# Patient Record
Sex: MALE | Race: WHITE | NOT HISPANIC OR LATINO | Employment: OTHER | URBAN - METROPOLITAN AREA
[De-identification: names, ages, dates, MRNs, and addresses within clinical notes are randomized per-mention and may not be internally consistent; named-entity substitution may affect disease eponyms.]

---

## 2017-02-10 ENCOUNTER — APPOINTMENT (OUTPATIENT)
Dept: GENERAL RADIOLOGY | Facility: HOSPITAL | Age: 67
End: 2017-02-10

## 2017-02-10 ENCOUNTER — HOSPITAL ENCOUNTER (OUTPATIENT)
Facility: HOSPITAL | Age: 67
Setting detail: OBSERVATION
LOS: 1 days | Discharge: HOME | End: 2017-02-13
Attending: EMERGENCY MEDICINE | Admitting: INTERNAL MEDICINE

## 2017-02-10 DIAGNOSIS — J44.0 COPD (CHRONIC OBSTRUCTIVE PULMONARY DISEASE) WITH ACUTE BRONCHITIS (HCC): ICD-10-CM

## 2017-02-10 DIAGNOSIS — R19.7 DIARRHEA, UNSPECIFIED TYPE: ICD-10-CM

## 2017-02-10 DIAGNOSIS — E86.9 VOLUME DEPLETION: ICD-10-CM

## 2017-02-10 DIAGNOSIS — B34.9 VIRAL SYNDROME: ICD-10-CM

## 2017-02-10 DIAGNOSIS — E86.0 DEHYDRATION: ICD-10-CM

## 2017-02-10 DIAGNOSIS — N17.9 ACUTE KIDNEY INJURY (HCC): ICD-10-CM

## 2017-02-10 DIAGNOSIS — J10.1 INFLUENZA A: Primary | ICD-10-CM

## 2017-02-10 DIAGNOSIS — J20.9 COPD (CHRONIC OBSTRUCTIVE PULMONARY DISEASE) WITH ACUTE BRONCHITIS (HCC): ICD-10-CM

## 2017-02-10 PROBLEM — I25.10 CAD (CORONARY ARTERY DISEASE): Status: ACTIVE | Noted: 2017-02-10

## 2017-02-10 LAB
ALBUMIN SERPL-MCNC: 4.2 G/DL (ref 3.5–5.2)
ALBUMIN/GLOB SERPL: 1.3 G/DL
ALP SERPL-CCNC: 62 U/L (ref 39–117)
ALT SERPL W P-5'-P-CCNC: 25 U/L (ref 1–41)
ANION GAP SERPL CALCULATED.3IONS-SCNC: 15.5 MMOL/L
AST SERPL-CCNC: 46 U/L (ref 1–40)
BASOPHILS # BLD AUTO: 0.01 10*3/MM3 (ref 0–0.2)
BASOPHILS NFR BLD AUTO: 0.1 % (ref 0–1.5)
BILIRUB SERPL-MCNC: 0.5 MG/DL (ref 0.1–1.2)
BUN BLD-MCNC: 47 MG/DL (ref 8–23)
BUN/CREAT SERPL: 34.1 (ref 7–25)
CALCIUM SPEC-SCNC: 9.6 MG/DL (ref 8.6–10.5)
CHLORIDE SERPL-SCNC: 99 MMOL/L (ref 98–107)
CO2 SERPL-SCNC: 26.5 MMOL/L (ref 22–29)
CREAT BLD-MCNC: 1.38 MG/DL (ref 0.76–1.27)
DEPRECATED RDW RBC AUTO: 48.1 FL (ref 37–54)
EOSINOPHIL # BLD AUTO: 0 10*3/MM3 (ref 0–0.7)
EOSINOPHIL NFR BLD AUTO: 0 % (ref 0.3–6.2)
ERYTHROCYTE [DISTWIDTH] IN BLOOD BY AUTOMATED COUNT: 12.7 % (ref 11.5–14.5)
FLUAV AG NPH QL: POSITIVE
FLUBV AG NPH QL IA: NEGATIVE
GFR SERPL CREATININE-BSD FRML MDRD: 51 ML/MIN/1.73
GLOBULIN UR ELPH-MCNC: 3.3 GM/DL
GLUCOSE BLD-MCNC: 117 MG/DL (ref 65–99)
GLUCOSE BLDC GLUCOMTR-MCNC: 93 MG/DL (ref 70–130)
HCT VFR BLD AUTO: 50.1 % (ref 40.4–52.2)
HGB BLD-MCNC: 17.1 G/DL (ref 13.7–17.6)
HOLD SPECIMEN: NORMAL
HOLD SPECIMEN: NORMAL
IMM GRANULOCYTES # BLD: 0.02 10*3/MM3 (ref 0–0.03)
IMM GRANULOCYTES NFR BLD: 0.2 % (ref 0–0.5)
LYMPHOCYTES # BLD AUTO: 1.16 10*3/MM3 (ref 0.9–4.8)
LYMPHOCYTES NFR BLD AUTO: 10.1 % (ref 19.6–45.3)
MCH RBC QN AUTO: 35.2 PG (ref 27–32.7)
MCHC RBC AUTO-ENTMCNC: 34.1 G/DL (ref 32.6–36.4)
MCV RBC AUTO: 103.1 FL (ref 79.8–96.2)
MONOCYTES # BLD AUTO: 1.59 10*3/MM3 (ref 0.2–1.2)
MONOCYTES NFR BLD AUTO: 13.8 % (ref 5–12)
NEUTROPHILS # BLD AUTO: 8.74 10*3/MM3 (ref 1.9–8.1)
NEUTROPHILS NFR BLD AUTO: 75.8 % (ref 42.7–76)
NT-PROBNP SERPL-MCNC: 97.1 PG/ML (ref 0–900)
PLATELET # BLD AUTO: 207 10*3/MM3 (ref 140–500)
PMV BLD AUTO: 11.1 FL (ref 6–12)
POTASSIUM BLD-SCNC: 4.3 MMOL/L (ref 3.5–5.2)
PROT SERPL-MCNC: 7.5 G/DL (ref 6–8.5)
RBC # BLD AUTO: 4.86 10*6/MM3 (ref 4.6–6)
SODIUM BLD-SCNC: 141 MMOL/L (ref 136–145)
TROPONIN T SERPL-MCNC: <0.01 NG/ML (ref 0–0.03)
WBC NRBC COR # BLD: 11.52 10*3/MM3 (ref 4.5–10.7)
WHOLE BLOOD HOLD SPECIMEN: NORMAL
WHOLE BLOOD HOLD SPECIMEN: NORMAL

## 2017-02-10 PROCEDURE — 87804 INFLUENZA ASSAY W/OPTIC: CPT | Performed by: EMERGENCY MEDICINE

## 2017-02-10 PROCEDURE — 96360 HYDRATION IV INFUSION INIT: CPT

## 2017-02-10 PROCEDURE — 80053 COMPREHEN METABOLIC PANEL: CPT | Performed by: EMERGENCY MEDICINE

## 2017-02-10 PROCEDURE — 85025 COMPLETE CBC W/AUTO DIFF WBC: CPT | Performed by: EMERGENCY MEDICINE

## 2017-02-10 PROCEDURE — 94640 AIRWAY INHALATION TREATMENT: CPT

## 2017-02-10 PROCEDURE — 25010000002 ENOXAPARIN PER 10 MG: Performed by: INTERNAL MEDICINE

## 2017-02-10 PROCEDURE — 99285 EMERGENCY DEPT VISIT HI MDM: CPT

## 2017-02-10 PROCEDURE — 93010 ELECTROCARDIOGRAM REPORT: CPT | Performed by: INTERNAL MEDICINE

## 2017-02-10 PROCEDURE — 84484 ASSAY OF TROPONIN QUANT: CPT | Performed by: EMERGENCY MEDICINE

## 2017-02-10 PROCEDURE — 83880 ASSAY OF NATRIURETIC PEPTIDE: CPT | Performed by: EMERGENCY MEDICINE

## 2017-02-10 PROCEDURE — G0378 HOSPITAL OBSERVATION PER HR: HCPCS

## 2017-02-10 PROCEDURE — 93005 ELECTROCARDIOGRAM TRACING: CPT | Performed by: EMERGENCY MEDICINE

## 2017-02-10 PROCEDURE — 71020 HC CHEST PA AND LATERAL: CPT

## 2017-02-10 PROCEDURE — 96372 THER/PROPH/DIAG INJ SC/IM: CPT

## 2017-02-10 PROCEDURE — 82962 GLUCOSE BLOOD TEST: CPT

## 2017-02-10 RX ORDER — LEVOTHYROXINE SODIUM 0.1 MG/1
100 TABLET ORAL DAILY
Status: DISCONTINUED | OUTPATIENT
Start: 2017-02-11 | End: 2017-02-13 | Stop reason: HOSPADM

## 2017-02-10 RX ORDER — SODIUM CHLORIDE 9 MG/ML
100 INJECTION, SOLUTION INTRAVENOUS CONTINUOUS
Status: DISCONTINUED | OUTPATIENT
Start: 2017-02-10 | End: 2017-02-10

## 2017-02-10 RX ORDER — SODIUM CHLORIDE 0.9 % (FLUSH) 0.9 %
1-10 SYRINGE (ML) INJECTION AS NEEDED
Status: DISCONTINUED | OUTPATIENT
Start: 2017-02-10 | End: 2017-02-13 | Stop reason: HOSPADM

## 2017-02-10 RX ORDER — SODIUM CHLORIDE 9 MG/ML
50 INJECTION, SOLUTION INTRAVENOUS CONTINUOUS
Status: DISCONTINUED | OUTPATIENT
Start: 2017-02-10 | End: 2017-02-12

## 2017-02-10 RX ORDER — ASPIRIN 81 MG/1
81 TABLET, CHEWABLE ORAL DAILY
Status: DISCONTINUED | OUTPATIENT
Start: 2017-02-11 | End: 2017-02-13 | Stop reason: HOSPADM

## 2017-02-10 RX ORDER — SODIUM CHLORIDE 0.9 % (FLUSH) 0.9 %
10 SYRINGE (ML) INJECTION AS NEEDED
Status: DISCONTINUED | OUTPATIENT
Start: 2017-02-10 | End: 2017-02-13 | Stop reason: HOSPADM

## 2017-02-10 RX ORDER — ONDANSETRON 4 MG/1
4 TABLET, ORALLY DISINTEGRATING ORAL EVERY 6 HOURS PRN
Status: DISCONTINUED | OUTPATIENT
Start: 2017-02-10 | End: 2017-02-13 | Stop reason: HOSPADM

## 2017-02-10 RX ORDER — ONDANSETRON 4 MG/1
4 TABLET, FILM COATED ORAL EVERY 6 HOURS PRN
Status: DISCONTINUED | OUTPATIENT
Start: 2017-02-10 | End: 2017-02-13 | Stop reason: HOSPADM

## 2017-02-10 RX ORDER — NICOTINE POLACRILEX 4 MG
15 LOZENGE BUCCAL
Status: DISCONTINUED | OUTPATIENT
Start: 2017-02-10 | End: 2017-02-13 | Stop reason: HOSPADM

## 2017-02-10 RX ORDER — ONDANSETRON 2 MG/ML
4 INJECTION INTRAMUSCULAR; INTRAVENOUS EVERY 6 HOURS PRN
Status: DISCONTINUED | OUTPATIENT
Start: 2017-02-10 | End: 2017-02-13 | Stop reason: HOSPADM

## 2017-02-10 RX ORDER — DIPHENOXYLATE HYDROCHLORIDE AND ATROPINE SULFATE 2.5; .025 MG/1; MG/1
1 TABLET ORAL 4 TIMES DAILY PRN
Status: DISCONTINUED | OUTPATIENT
Start: 2017-02-10 | End: 2017-02-13 | Stop reason: HOSPADM

## 2017-02-10 RX ORDER — ACETAMINOPHEN 325 MG/1
650 TABLET ORAL EVERY 4 HOURS PRN
Status: DISCONTINUED | OUTPATIENT
Start: 2017-02-10 | End: 2017-02-13 | Stop reason: HOSPADM

## 2017-02-10 RX ORDER — IPRATROPIUM BROMIDE AND ALBUTEROL SULFATE 2.5; .5 MG/3ML; MG/3ML
3 SOLUTION RESPIRATORY (INHALATION)
Status: DISCONTINUED | OUTPATIENT
Start: 2017-02-10 | End: 2017-02-13 | Stop reason: HOSPADM

## 2017-02-10 RX ORDER — PRAVASTATIN SODIUM 40 MG
40 TABLET ORAL DAILY
Status: DISCONTINUED | OUTPATIENT
Start: 2017-02-11 | End: 2017-02-13 | Stop reason: HOSPADM

## 2017-02-10 RX ORDER — DEXTROSE MONOHYDRATE 25 G/50ML
25 INJECTION, SOLUTION INTRAVENOUS
Status: DISCONTINUED | OUTPATIENT
Start: 2017-02-10 | End: 2017-02-13 | Stop reason: HOSPADM

## 2017-02-10 RX ORDER — OSELTAMIVIR PHOSPHATE 75 MG/1
75 CAPSULE ORAL EVERY 12 HOURS SCHEDULED
Status: DISCONTINUED | OUTPATIENT
Start: 2017-02-10 | End: 2017-02-13 | Stop reason: HOSPADM

## 2017-02-10 RX ADMIN — SODIUM CHLORIDE 100 ML/HR: 9 INJECTION, SOLUTION INTRAVENOUS at 19:45

## 2017-02-10 RX ADMIN — IPRATROPIUM BROMIDE AND ALBUTEROL SULFATE 3 ML: .5; 3 SOLUTION RESPIRATORY (INHALATION) at 23:22

## 2017-02-10 RX ADMIN — ENOXAPARIN SODIUM 40 MG: 40 INJECTION SUBCUTANEOUS at 22:06

## 2017-02-10 RX ADMIN — SODIUM CHLORIDE 1000 ML: 9 INJECTION, SOLUTION INTRAVENOUS at 16:27

## 2017-02-10 RX ADMIN — SODIUM CHLORIDE 125 ML/HR: 9 INJECTION, SOLUTION INTRAVENOUS at 18:40

## 2017-02-10 NOTE — ED NOTES
"Patient states he was diagnosed with a sinus infection on Monday, but states \"I feel so dried out, and every time I drink something, it comes right back up\" and states he is having increased shortness of breath.      Jayda Salgado RN  02/10/17 2790    "

## 2017-02-10 NOTE — ED PROVIDER NOTES
EMERGENCY DEPARTMENT ENCOUNTER    CHIEF COMPLAINT  Chief Complaint: Flu-like symptoms  History given by: Pt  History limited by: None  Room Number: 631/1  PMD: MICHAEL Collado      HPI:  Pt is a 66 y.o. male who presents complaining of flu-like symptoms onset 4 days ago including sore throat, fever on Monday, chills, and diaphoresis. He also c/o near-syncopal twice yesterday and diarrhea onset Tuesday. Pt was given steroids by PMD on Monday, but he has had no relief. He denies vomiting, abd pain, and recent abs. He reports his wife is in the ED with similar symptoms.    Duration:  4 days  Onset: gradual  Timing: constant  Location: n/a  Radiation: n/a  Quality: n/a  Intensity/Severity: moderate  Progression: gradually worsening  Associated Symptoms: sore trhoat, fever, chills, diaphoresis, near-syncope and diarrhea  Aggravating Factors: none  Alleviating Factors: none  Previous Episodes: none  Treatment before arrival: steroids given by PMD    PAST MEDICAL HISTORY  Active Ambulatory Problems     Diagnosis Date Noted   • S/P coronary artery stent placement 05/06/2016   • ST elevation myocardial infarction involving left anterior descending (LAD) coronary artery 05/06/2016   • Diabetes mellitus without complication 05/06/2016   • Essential hypertension 05/06/2016     Resolved Ambulatory Problems     Diagnosis Date Noted   • No Resolved Ambulatory Problems     Past Medical History   Diagnosis Date   • CAD (coronary artery disease)    • COPD (chronic obstructive pulmonary disease)    • Diabetes    • Hyperlipidemia    • Myocardial infarction 03/2011   • Sinus bradycardia        PAST SURGICAL HISTORY  Past Surgical History   Procedure Laterality Date   • Cardiac catheterization       2011:3.0x15mm Vision pLAD; 3.5x20mm Vision p circ; 3.5x28mm Vision to mRCA   • Coronary stent placement       2011:3.0x15mm Vision pLAD; 3.5x20mm Vision p circ; 3.5x28mm Vision to mRCA       FAMILY HISTORY  History reviewed. No  pertinent family history.    SOCIAL HISTORY  Social History     Social History   • Marital status:      Spouse name: N/A   • Number of children: N/A   • Years of education: N/A     Occupational History   • Not on file.     Social History Main Topics   • Smoking status: Former Smoker   • Smokeless tobacco: Not on file   • Alcohol use No   • Drug use: No   • Sexual activity: Not on file     Other Topics Concern   • Not on file     Social History Narrative       ALLERGIES  Review of patient's allergies indicates no known allergies.    REVIEW OF SYSTEMS  Review of Systems   Constitutional: Positive for chills, diaphoresis and fever ( resolved). Negative for activity change and appetite change.   HENT: Positive for sore throat. Negative for congestion.    Eyes: Negative.    Respiratory: Negative for cough and shortness of breath.    Cardiovascular: Negative for chest pain and leg swelling.   Gastrointestinal: Positive for diarrhea. Negative for abdominal pain and vomiting.   Endocrine: Negative.    Genitourinary: Negative for decreased urine volume and dysuria.   Musculoskeletal: Negative for neck pain.   Skin: Negative for rash and wound.   Allergic/Immunologic: Negative.    Neurological: Positive for syncope ( near-syncope twice yesterday). Negative for weakness, numbness and headaches.   Hematological: Negative.    Psychiatric/Behavioral: Negative.    All other systems reviewed and are negative.      PHYSICAL EXAM  ED Triage Vitals   Temp Heart Rate Resp BP SpO2   02/10/17 1438 02/10/17 1438 02/10/17 1438 02/10/17 1500 02/10/17 1438   99 °F (37.2 °C) 105 18 99/77 92 %      Temp src Heart Rate Source Patient Position BP Location FiO2 (%)   02/10/17 1438 02/10/17 1438 02/10/17 1500 -- --   Tympanic Monitor Sitting         Physical Exam   Constitutional: He is oriented to person, place, and time and well-developed, well-nourished, and in no distress.   HENT:   Head: Normocephalic and atraumatic.   Eyes: EOM are  normal. Pupils are equal, round, and reactive to light.   Neck: Normal range of motion. Neck supple.   Cardiovascular: Normal rate, regular rhythm and normal heart sounds.    Pulmonary/Chest: Effort normal. No respiratory distress. He has wheezes ( faint, in the bases bilaterally).   Abdominal: Soft. There is no tenderness. There is no rebound and no guarding.   Musculoskeletal: Normal range of motion. He exhibits no edema.   Neurological: He is alert and oriented to person, place, and time. He has normal sensation and normal strength.   Skin: Skin is warm and dry.   Psychiatric: Mood and affect normal.   Nursing note and vitals reviewed.      LAB RESULTS  Lab Results (last 24 hours)     Procedure Component Value Units Date/Time    CBC & Differential [[id number]] Collected:  02/10/17 1556    Specimen:  Blood Updated:  02/10/17 1626    Narrative:       The following orders were created for panel order CBC & Differential.  Procedure                               Abnormality         Status                     ---------                               -----------         ------                     CBC Auto Differential[[id number]]         Abnormal            Final result                 Please view results for these tests on the individual orders.    Comprehensive Metabolic Panel [[id number]]  (Abnormal) Collected:  02/10/17 1556    Specimen:  Blood Updated:  02/10/17 1642     Glucose 117 (H) mg/dL      BUN 47 (H) mg/dL      Creatinine 1.38 (H) mg/dL      Sodium 141 mmol/L      Potassium 4.3 mmol/L      Chloride 99 mmol/L      CO2 26.5 mmol/L      Calcium 9.6 mg/dL      Total Protein 7.5 g/dL      Albumin 4.20 g/dL      ALT (SGPT) 25 U/L      AST (SGOT) 46 (H) U/L      Alkaline Phosphatase 62 U/L      Total Bilirubin 0.5 mg/dL      eGFR Non African Amer 51 (L) mL/min/1.73      Globulin 3.3 gm/dL      A/G Ratio 1.3 g/dL      BUN/Creatinine Ratio 34.1 (H)      Anion Gap 15.5 mmol/L     BNP [[id number]]  (Normal) Collected:   02/10/17 1556    Specimen:  Blood Updated:  02/10/17 1659     proBNP 97.1 pg/mL     Narrative:       Among patients with dyspnea, NT-proBNP is highly sensitive for the detection of acute congestive heart failure. In addition NT-proBNP of <300 pg/ml effectively rules out acute congestive heart failure with 99% negative predictive value.    Troponin [[id number]]  (Normal) Collected:  02/10/17 1556    Specimen:  Blood Updated:  02/10/17 1659     Troponin T <0.010 ng/mL     Narrative:       Troponin T Reference Ranges:  Less than 0.03 ng/mL:    Negative for AMI  0.03 to 0.09 ng/mL:      Indeterminant for AMI  Greater than 0.09 ng/mL: Positive for AMI    CBC Auto Differential [[id number]]  (Abnormal) Collected:  02/10/17 1556    Specimen:  Blood Updated:  02/10/17 1626     WBC 11.52 (H) 10*3/mm3      RBC 4.86 10*6/mm3      Hemoglobin 17.1 g/dL      Hematocrit 50.1 %      .1 (H) fL      MCH 35.2 (H) pg      MCHC 34.1 g/dL      RDW 12.7 %      RDW-SD 48.1 fl      MPV 11.1 fL      Platelets 207 10*3/mm3      Neutrophil % 75.8 %      Lymphocyte % 10.1 (L) %      Monocyte % 13.8 (H) %      Eosinophil % 0.0 (L) %      Basophil % 0.1 %      Immature Grans % 0.2 %      Neutrophils, Absolute 8.74 (H) 10*3/mm3      Lymphocytes, Absolute 1.16 10*3/mm3      Monocytes, Absolute 1.59 (H) 10*3/mm3      Eosinophils, Absolute 0.00 10*3/mm3      Basophils, Absolute 0.01 10*3/mm3      Immature Grans, Absolute 0.02 10*3/mm3     Influenza Antigen [[id number]]  (Abnormal) Collected:  02/10/17 1622    Specimen:  Swab from Nasopharynx Updated:  02/10/17 1703     Influenza A Ag, EIA Positive (A)      Influenza B Ag, EIA Negative     POC Glucose Fingerstick [[id number]]  (Normal) Collected:  02/10/17 2128    Specimen:  Blood Updated:  02/10/17 2129     Glucose 93 mg/dL     Narrative:       Meter: [id number] : [id number] Jese Renee I ordered the above labs and reviewed the results    RADIOLOGY  XR Chest 2 View   Final Result    Negative acute        I ordered the above noted radiological studies. Interpreted by radiologist. Reviewed by me in PACS.       PROCEDURES  Procedures  EKG           EKG time: 1539  Rhythm/Rate: NSR, 91  P waves and MI: Nml  QRS, axis: Low voltage, otherwise nml   ST and T waves: Nml     Interpreted Contemporaneously by me, independently viewed  Improved compared to prior 2/26/11    PROGRESS AND CONSULTS  ED Course     1523  Ordered EKG, labs (including influenza antigen), and CXR for further evaluation.    1649  Placed a call to [facility] for consult to admit.    1715  Discussed case with Dr Miles  Reviewed history, exam, results and treatments.  Discussed concerns and plan of care. Dr Miles accepts pt to be admitted to Med/Surg.    1718  Rechecked pt.  D/w pt and family the need for admission because of flu and dehydration. Pt and family understand and agree with plan. All questions addressed.       MEDICAL DECISION MAKING  Results were reviewed/discussed with the patient and they were also made aware of online access. Pt also made aware that some labs, such as cultures, will not be resulted during ER visit and follow up with PMD is necessary.     MDM  Number of Diagnoses or Management Options  Acute kidney injury:   Dehydration:   Diarrhea, unspecified type:   Influenza A:   Viral syndrome:   Volume depletion:      Amount and/or Complexity of Data Reviewed  Clinical lab tests: reviewed and ordered (Glucose 117 (H)   BUN 47 (H)   Creatinine 1.38 (H)   BUN/Creatinine Ratio 34.1 (H)   WBC 11.52 (H)   Influenza A - positive)  Tests in the radiology section of CPT®: reviewed and ordered (CXR - negative acute  CT - negative acute)  Tests in the medicine section of CPT®: reviewed and ordered (See EKG procedure note for interpretation.)  Decide to obtain previous medical records or to obtain history from someone other than the patient: yes  Review and summarize past medical records: yes           DIAGNOSIS  Final  diagnoses:   Influenza A   Viral syndrome   Diarrhea, unspecified type   Volume depletion   Dehydration   Acute kidney injury       DISPOSITION  ADMISSION    Discussed treatment plan and reason for admission with pt/family and admitting physician.  Pt/family voiced understanding of the plan for admission for further testing/treatment as needed.         Latest Documented Vital Signs:  As of 10:13 PM  BP- 101/70 HR- 73 Temp- 96.4 °F (35.8 °C) (Oral) O2 sat- 94%    --  Documentation assistance provided by leila Haas for Dr. Bobo.  Information recorded by the scribe was done at my direction and has been verified and validated by me.                 Ana Haas  02/10/17 1723       Sidney Bobo MD  02/10/17 5282

## 2017-02-10 NOTE — ED NOTES
Pt to ED for cough, soa, congestion, fever, diarrhea for several days. States saw PCP on Monday and dx with sinus infection, symptoms not improving.      Sharon Walters RN  02/10/17 3094

## 2017-02-11 LAB
ANION GAP SERPL CALCULATED.3IONS-SCNC: 13 MMOL/L
BUN BLD-MCNC: 40 MG/DL (ref 8–23)
BUN/CREAT SERPL: 40.8 (ref 7–25)
CALCIUM SPEC-SCNC: 8.1 MG/DL (ref 8.6–10.5)
CHLORIDE SERPL-SCNC: 106 MMOL/L (ref 98–107)
CO2 SERPL-SCNC: 24 MMOL/L (ref 22–29)
CREAT BLD-MCNC: 0.98 MG/DL (ref 0.76–1.27)
GFR SERPL CREATININE-BSD FRML MDRD: 76 ML/MIN/1.73
GLUCOSE BLD-MCNC: 90 MG/DL (ref 65–99)
GLUCOSE BLDC GLUCOMTR-MCNC: 160 MG/DL (ref 70–130)
GLUCOSE BLDC GLUCOMTR-MCNC: 231 MG/DL (ref 70–130)
GLUCOSE BLDC GLUCOMTR-MCNC: 93 MG/DL (ref 70–130)
GLUCOSE BLDC GLUCOMTR-MCNC: 95 MG/DL (ref 70–130)
POTASSIUM BLD-SCNC: 4 MMOL/L (ref 3.5–5.2)
SODIUM BLD-SCNC: 143 MMOL/L (ref 136–145)

## 2017-02-11 PROCEDURE — 82962 GLUCOSE BLOOD TEST: CPT

## 2017-02-11 PROCEDURE — 25010000002 ENOXAPARIN PER 10 MG: Performed by: INTERNAL MEDICINE

## 2017-02-11 PROCEDURE — 96361 HYDRATE IV INFUSION ADD-ON: CPT

## 2017-02-11 PROCEDURE — 94799 UNLISTED PULMONARY SVC/PX: CPT

## 2017-02-11 PROCEDURE — 25010000002 METHYLPREDNISOLONE PER 125 MG: Performed by: HOSPITALIST

## 2017-02-11 PROCEDURE — 94640 AIRWAY INHALATION TREATMENT: CPT

## 2017-02-11 PROCEDURE — 80048 BASIC METABOLIC PNL TOTAL CA: CPT | Performed by: INTERNAL MEDICINE

## 2017-02-11 PROCEDURE — 96372 THER/PROPH/DIAG INJ SC/IM: CPT

## 2017-02-11 PROCEDURE — 96374 THER/PROPH/DIAG INJ IV PUSH: CPT

## 2017-02-11 PROCEDURE — 63710000001 INSULIN ASPART PER 5 UNITS: Performed by: INTERNAL MEDICINE

## 2017-02-11 PROCEDURE — 63710000001 PREDNISONE PER 5 MG: Performed by: HOSPITALIST

## 2017-02-11 RX ORDER — BENZONATATE 200 MG/1
200 CAPSULE ORAL 3 TIMES DAILY PRN
Status: DISCONTINUED | OUTPATIENT
Start: 2017-02-11 | End: 2017-02-13 | Stop reason: HOSPADM

## 2017-02-11 RX ORDER — ALBUTEROL SULFATE 0.63 MG/3ML
0.63 SOLUTION RESPIRATORY (INHALATION) EVERY 6 HOURS PRN
Status: DISCONTINUED | OUTPATIENT
Start: 2017-02-11 | End: 2017-02-13 | Stop reason: HOSPADM

## 2017-02-11 RX ORDER — METHYLPREDNISOLONE SODIUM SUCCINATE 125 MG/2ML
60 INJECTION, POWDER, LYOPHILIZED, FOR SOLUTION INTRAMUSCULAR; INTRAVENOUS ONCE
Status: COMPLETED | OUTPATIENT
Start: 2017-02-11 | End: 2017-02-11

## 2017-02-11 RX ORDER — PREDNISONE 20 MG/1
20 TABLET ORAL 2 TIMES DAILY WITH MEALS
Status: DISCONTINUED | OUTPATIENT
Start: 2017-02-11 | End: 2017-02-13 | Stop reason: HOSPADM

## 2017-02-11 RX ORDER — GUAIFENESIN 600 MG/1
1200 TABLET, EXTENDED RELEASE ORAL 2 TIMES DAILY
Status: DISCONTINUED | OUTPATIENT
Start: 2017-02-11 | End: 2017-02-13 | Stop reason: HOSPADM

## 2017-02-11 RX ADMIN — ASPIRIN 81 MG: 81 TABLET, CHEWABLE ORAL at 09:58

## 2017-02-11 RX ADMIN — INSULIN ASPART 2 UNITS: 100 INJECTION, SOLUTION INTRAVENOUS; SUBCUTANEOUS at 21:46

## 2017-02-11 RX ADMIN — IPRATROPIUM BROMIDE AND ALBUTEROL SULFATE 3 ML: .5; 3 SOLUTION RESPIRATORY (INHALATION) at 15:10

## 2017-02-11 RX ADMIN — LEVOTHYROXINE SODIUM 100 MCG: 100 TABLET ORAL at 09:58

## 2017-02-11 RX ADMIN — ACETAMINOPHEN 650 MG: 325 TABLET ORAL at 20:50

## 2017-02-11 RX ADMIN — ENOXAPARIN SODIUM 40 MG: 40 INJECTION SUBCUTANEOUS at 20:51

## 2017-02-11 RX ADMIN — IPRATROPIUM BROMIDE AND ALBUTEROL SULFATE 3 ML: .5; 3 SOLUTION RESPIRATORY (INHALATION) at 10:47

## 2017-02-11 RX ADMIN — GUAIFENESIN 1200 MG: 600 TABLET, EXTENDED RELEASE ORAL at 19:08

## 2017-02-11 RX ADMIN — IPRATROPIUM BROMIDE AND ALBUTEROL SULFATE 3 ML: .5; 3 SOLUTION RESPIRATORY (INHALATION) at 07:11

## 2017-02-11 RX ADMIN — OSELTAMIVIR PHOSPHATE 75 MG: 75 CAPSULE ORAL at 09:58

## 2017-02-11 RX ADMIN — PREDNISONE 20 MG: 20 TABLET ORAL at 21:08

## 2017-02-11 RX ADMIN — OSELTAMIVIR PHOSPHATE 75 MG: 75 CAPSULE ORAL at 00:13

## 2017-02-11 RX ADMIN — ACETAMINOPHEN 650 MG: 325 TABLET ORAL at 10:07

## 2017-02-11 RX ADMIN — PRAVASTATIN SODIUM 40 MG: 40 TABLET ORAL at 09:58

## 2017-02-11 RX ADMIN — IPRATROPIUM BROMIDE AND ALBUTEROL SULFATE 3 ML: .5; 3 SOLUTION RESPIRATORY (INHALATION) at 21:10

## 2017-02-11 RX ADMIN — INSULIN ASPART 3 UNITS: 100 INJECTION, SOLUTION INTRAVENOUS; SUBCUTANEOUS at 12:18

## 2017-02-11 RX ADMIN — METHYLPREDNISOLONE SODIUM SUCCINATE 60 MG: 125 INJECTION, POWDER, FOR SOLUTION INTRAMUSCULAR; INTRAVENOUS at 15:39

## 2017-02-11 RX ADMIN — GUAIFENESIN 1200 MG: 600 TABLET, EXTENDED RELEASE ORAL at 11:22

## 2017-02-11 RX ADMIN — SODIUM CHLORIDE 125 ML/HR: 9 INJECTION, SOLUTION INTRAVENOUS at 04:38

## 2017-02-11 RX ADMIN — OSELTAMIVIR PHOSPHATE 75 MG: 75 CAPSULE ORAL at 21:08

## 2017-02-11 RX ADMIN — BENZONATATE 200 MG: 200 CAPSULE, LIQUID FILLED ORAL at 18:29

## 2017-02-11 RX ADMIN — BENZONATATE 200 MG: 200 CAPSULE, LIQUID FILLED ORAL at 11:22

## 2017-02-11 NOTE — PLAN OF CARE
Problem: Patient Care Overview (Adult)  Goal: Plan of Care Review  Outcome: Ongoing (interventions implemented as appropriate)    02/10/17 1859   Coping/Psychosocial Response Interventions   Plan Of Care Reviewed With patient   Patient Care Overview   Progress no change   Outcome Evaluation   Outcome Summary/Follow up Plan STABLE. VSS.          Problem: Renal Failure/Kidney Injury, Acute (Adult)  Goal: Signs and Symptoms of Listed Potential Problems Will be Absent or Manageable (Renal Failure/Kidney Injury, Acute)  Outcome: Ongoing (interventions implemented as appropriate)    02/10/17 1859   Renal Failure/Kidney Injury, Acute   Problems Assessed (Acute Renal Failure/Kidney Injury) all   Problems Present (Acute Renal Failure/Kidney Injury) electrolyte imbalance;fluid imbalance

## 2017-02-11 NOTE — H&P
"      Patient Care Team:  MICHAEL Collado as PCP - General (Family Medicine)    Chief complaint: SOA    Subjective     Shortness of Breath   Associated symptoms include a fever. Pertinent negatives include no vomiting or wheezing.   Dehydration   Associated symptoms include chills, congestion, coughing, a fever, myalgias and nausea. Pertinent negatives include no arthralgias or vomiting.   Pleasant 65 yo male with history of CAD, HLD, COPD, HTN. He presents with 4 day history of SOA. Associated cough, nausea, diarrhea, fevers and chills. His wife has been sick with similar illness. He has not been drinking much fluids because \"it just goes right through me\". He presented to [facility] ER and was found to have the FLU and DESI and we were asked to admit the patient. He feels a little better now with fluids.     Review of Systems   Constitutional: Positive for chills and fever.   HENT: Positive for congestion. Negative for facial swelling.    Eyes: Negative.    Respiratory: Positive for cough and shortness of breath. Negative for wheezing.    Cardiovascular: Negative.    Gastrointestinal: Positive for diarrhea and nausea. Negative for vomiting.   Endocrine: Negative.    Genitourinary: Negative.    Musculoskeletal: Positive for myalgias. Negative for arthralgias.   Skin: Negative.    Allergic/Immunologic: Negative.    Neurological: Negative.    Hematological: Negative.    Psychiatric/Behavioral: Negative.     Ext- no leg swelling, no history of blood clots    Past Medical History   Diagnosis Date   • CAD (coronary artery disease)    • COPD (chronic obstructive pulmonary disease)    • Diabetes    • Hyperlipidemia    • Myocardial infarction 03/2011     tx with PCI   • Sinus bradycardia      Past Surgical History   Procedure Laterality Date   • Cardiac catheterization       2011:3.0x15mm Vision pLAD; 3.5x20mm Vision p circ; 3.5x28mm Vision to mRCA   • Coronary stent placement       2011:3.0x15mm Vision pLAD; 3.5x20mm " Vision p circ; 3.5x28mm Vision to mRCA     History reviewed. No pertinent family history.  Social History   Substance Use Topics   • Smoking status: Former Smoker   • Smokeless tobacco: None   • Alcohol use No     Prescriptions Prior to Admission   Medication Sig Dispense Refill Last Dose   • aspirin 81 MG tablet Take  by mouth daily.      • levothyroxine (SYNTHROID, LEVOTHROID) 100 MCG tablet Take 100 mcg by mouth daily.      • lisinopril (PRINIVIL,ZESTRIL) 5 MG tablet TAKE ONE TABLET BY MOUTH ONCE DAILY 90 tablet 2    • pravastatin (PRAVACHOL) 40 MG tablet Take 40 mg by mouth Daily.        Allergies:  Review of patient's allergies indicates no known allergies.    Objective      Vital Signs  Temp:  [96.4 °F (35.8 °C)-99 °F (37.2 °C)] 96.4 °F (35.8 °C)  Heart Rate:  [] 73  Resp:  [16-20] 20  BP: ()/(70-82) 101/70    Physical Exam   Constitutional: He is oriented to person, place, and time. He appears well-developed and well-nourished. No distress.   HENT:   Head: Normocephalic and atraumatic.   Mouth/Throat: Oropharynx is clear and moist.   Poor dentition   Eyes: Conjunctivae and EOM are normal. No scleral icterus.   Neck: Normal range of motion. Neck supple. No JVD present.   Cardiovascular: Normal rate, regular rhythm and normal heart sounds.    No murmur heard.  Pulmonary/Chest: Effort normal and breath sounds normal. No respiratory distress.   Abdominal: Soft. Bowel sounds are normal. There is no tenderness.   Genitourinary:   Genitourinary Comments: Deferred    Musculoskeletal: Normal range of motion. He exhibits no edema.   Neurological: He is alert and oriented to person, place, and time. No cranial nerve deficit. He exhibits normal muscle tone.   Skin: Skin is warm and dry. He is not diaphoretic.   Psychiatric: He has a normal mood and affect. His behavior is normal. Thought content normal.   Nursing note and vitals reviewed.      Results Review:   I reviewed the patient's new clinical  results.    XR Chest 2 View [[id number]] Not Reviewed       Order Status: Completed Collected: 02/10/17 1558      Updated: 02/10/17 1602     Narrative:       EMERGENCY TWO-VIEW CHEST     HISTORY: 66-year-old male with emphysema COPD hypertension previous  tobacco abuse coronary artery disease with stent placement presents for  evaluation of shortness of breath.     COMPARISON: 02/22/2011     FINDINGS:  1. Stable negative acute chest, the lungs remain clear of an active  process.        Influenza Antigen [[id number]] (Abnormal) Collected: 02/10/17 1622        Lab Status: Final result Specimen: Swab from Nasopharynx Updated: 02/10/17 1703        Influenza A Ag, EIA Positive (A)         Influenza B Ag, EIA Negative        [place] Draw [[id number]] Collected: 02/10/17 1556       Lab Status: Final result Specimen: Blood Updated: 02/10/17 2001       Narrative:         The following orders were created for panel order [place] Draw.  Procedure                               Abnormality         Status                     ---------                               -----------         ------                     Light Blue Top[[id number]]                                    Final result               Green Top (Gel)[[id number]]                                   Final result               Lavender Top[[id number]]                                      Final result               Gold Top - SST[[id number]]                                    Final result                 Please view results for these tests on the individual orders.       Comprehensive Metabolic Panel [[id number]] (Abnormal) Collected: 02/10/17 1556       Lab Status: Final result Specimen: Blood Updated: 02/10/17 1642        Glucose 117 (H) mg/dL         BUN 47 (H) mg/dL         Creatinine 1.38 (H) mg/dL         Sodium 141 mmol/L         Potassium 4.3 mmol/L         Chloride 99 mmol/L         CO2 26.5 mmol/L         Calcium 9.6 mg/dL         Total Protein 7.5 g/dL         Albumin 4.20 g/dL          ALT (SGPT) 25 U/L         AST (SGOT) 46 (H) U/L         Alkaline Phosphatase 62 U/L         Total Bilirubin 0.5 mg/dL         eGFR Non African Amer 51 (L) mL/min/1.73         Globulin 3.3 gm/dL         A/G Ratio 1.3 g/dL         BUN/Creatinine Ratio 34.1 (H)         Anion Gap 15.5 mmol/L        BNP [[id number]] (Normal) Collected: 02/10/17 1556       Lab Status: Final result Specimen: Blood Updated: 02/10/17 1659        proBNP 97.1 pg/mL        Narrative:         Among patients with dyspnea, NT-proBNP is highly sensitive for the detection of acute congestive heart failure. In addition NT-proBNP of <300 pg/ml effectively rules out acute congestive heart failure with 99% negative predictive value.       Troponin [[id number]] (Normal) Collected: 02/10/17 1556       Lab Status: Final result Specimen: Blood Updated: 02/10/17 1659        Troponin T <0.010 ng/mL        Narrative:         Troponin T Reference Ranges:  Less than 0.03 ng/mL:    Negative for AMI  0.03 to 0.09 ng/mL:      Indeterminant for AMI  Greater than 0.09 ng/mL: Positive for AMI       CBC Auto Differential [[id number]] (Abnormal) Collected: 02/10/17 1556       Lab Status: Final result Specimen: Blood Updated: 02/10/17 1626        WBC 11.52 (H) 10*3/mm3         RBC 4.86 10*6/mm3         Hemoglobin 17.1 g/dL         Hematocrit 50.1 %         .1 (H) fL         MCH 35.2 (H) pg         MCHC 34.1 g/dL         RDW 12.7 %         RDW-SD 48.1 fl         MPV 11.1 fL         Platelets 207 10*3/mm3         Neutrophil % 75.8 %         Lymphocyte % 10.1 (L) %         Monocyte % 13.8 (H) %         Eosinophil % 0.0 (L) %         Basophil % 0.1 %         Immature Grans % 0.2 %         Neutrophils, Absolute 8.74 (H) 10*3/mm3             Assessment/Plan     Principal Problem:    Influenza A  Active Problems:    Diabetes mellitus without complication    Essential hypertension    DESI (acute kidney injury)    CAD (coronary artery  disease)    -Tamiflu  -IVFs  -Duonebs  -PRN agents  -Monitor labs. Monitor BG- listed history of DM but not on meds. Have on SSI here PRN    Suspect can be discharged 2/11 if improved. Also of note his wife was also admitted for Flu and other symptoms.   Assessment & Plan    I discussed the patients findings and my recommendations with patient and consulting provider- ER physician. Reviewed previous records.     Haroon Miles MD  02/10/17  10:49 PM

## 2017-02-11 NOTE — PLAN OF CARE
Problem: Patient Care Overview (Adult)  Goal: Plan of Care Review  Outcome: Ongoing (interventions implemented as appropriate)    02/11/17 0441   Coping/Psychosocial Response Interventions   Plan Of Care Reviewed With patient   Patient Care Overview   Progress improving   Outcome Evaluation   Outcome Summary/Follow up Plan pt. receiving IV fluids for rehydration, VSS, denies NV, c/o diarrhea once this shift and mild headache. Pt. encouraged to increase oral fluid and food intake.         Problem: Renal Failure/Kidney Injury, Acute (Adult)  Goal: Signs and Symptoms of Listed Potential Problems Will be Absent or Manageable (Renal Failure/Kidney Injury, Acute)  Outcome: Ongoing (interventions implemented as appropriate)    02/11/17 0441   Renal Failure/Kidney Injury, Acute   Problems Assessed (Acute Renal Failure/Kidney Injury) acid-base imbalance;fluid imbalance;electrolyte imbalance;hypertension;infection   Problems Present (Acute Renal Failure/Kidney Injury) infection;fluid imbalance         Problem: Fluid Volume Deficit (Adult)  Goal: Identify Related Risk Factors and Signs and Symptoms  Outcome: Ongoing (interventions implemented as appropriate)    02/11/17 0441   Fluid Volume Deficit   Fluid Volume Deficit: Related Risk Factors infection/sepsis;vomiting/diarrhea   Signs and Symptoms (Fluid Volume Deficit) headache;nausea/vomiting, anorexia, diarrhea complaints       Goal: Fluid/Electrolyte Balance  Outcome: Ongoing (interventions implemented as appropriate)    02/11/17 0441   Fluid Volume Deficit (Adult)   Fluid/Electrolyte Balance making progress toward outcome       Goal: Comfort/Well Being  Outcome: Ongoing (interventions implemented as appropriate)    02/11/17 0441   Fluid Volume Deficit (Adult)   Comfort/Well Being making progress toward outcome         Problem: Diarrhea (Adult)  Goal: Identify Related Risk Factors and Signs and Symptoms  Outcome: Ongoing (interventions implemented as appropriate)     02/11/17 0441   Diarrhea   Signs and Symptoms (Diarrhea) headache;loose, watery stool;malaise       Goal: Improved/Reduced Symptoms  Outcome: Ongoing (interventions implemented as appropriate)    02/11/17 0441   Diarrhea (Adult)   Improved/Reduced Symptoms making progress toward outcome

## 2017-02-11 NOTE — CONSULTS
Adult Nutrition  Assessment    Patient Name:  Db Hardin  YOB: 1950  MRN: [id number]  Admit Date:  2/10/2017    Assessment Date:  2/11/2017     Comments:  Nursing nutrition screen trigger.  Poor po intake last few days due to illness.  Will follow to po intake and tolerance.         Reason for Assessment       02/11/17 1158    Reason for Assessment    Reason For Assessment/Visit identified at risk by screening criteria    Identified At Risk By Screening Criteria MST SCORE 2+    Diagnosis Diagnosis    Endocrine DM    Infectious Disease --   Flu    Pulmonary/Critical Care COPD                    Labs/Tests/Procedures/Meds       02/11/17 1159    Labs/Tests/Procedures/Meds    Diagnostic Test/Procedure Review reviewed    Labs/Tests Review Reviewed    Medication Review Reviewed, pertinent;Insulin    Significant Vitals reviewed            Physical Findings       02/11/17 1159    Physical Findings/Assessment    Additional Documentation Physical Appearance (Group)    Physical Appearance    Skin --   intact              Nutrition Prescription Ordered       02/11/17 1159    Nutrition Prescription PO    Common Modifiers Consistent Carbohydrate;Cardiac            Evaluation of Received Nutrient/Fluid Intake       02/11/17 1200    Evaluation of Received Nutrient/Fluid Intake    Nutrition Delivered Fluid Evaluation    Fluid Intake Evaluation    IV Fluid (mL) 3000    Total Fluid Intake (mL) 3000    PO Evaluation    Number of Days PO Intake Evaluated Insufficient Data    % PO Intake no intake yet documented                    Electronically signed by:  Elenita Aguirre RD  02/11/17 12:02 PM

## 2017-02-12 LAB
ANION GAP SERPL CALCULATED.3IONS-SCNC: 13 MMOL/L
BUN BLD-MCNC: 27 MG/DL (ref 8–23)
BUN/CREAT SERPL: 34.2 (ref 7–25)
CALCIUM SPEC-SCNC: 8.6 MG/DL (ref 8.6–10.5)
CHLORIDE SERPL-SCNC: 106 MMOL/L (ref 98–107)
CO2 SERPL-SCNC: 23 MMOL/L (ref 22–29)
CREAT BLD-MCNC: 0.79 MG/DL (ref 0.76–1.27)
GFR SERPL CREATININE-BSD FRML MDRD: 98 ML/MIN/1.73
GLUCOSE BLD-MCNC: 149 MG/DL (ref 65–99)
GLUCOSE BLDC GLUCOMTR-MCNC: 125 MG/DL (ref 70–130)
GLUCOSE BLDC GLUCOMTR-MCNC: 134 MG/DL (ref 70–130)
GLUCOSE BLDC GLUCOMTR-MCNC: 150 MG/DL (ref 70–130)
GLUCOSE BLDC GLUCOMTR-MCNC: 159 MG/DL (ref 70–130)
HBA1C MFR BLD: 5.43 % (ref 4.8–5.6)
POTASSIUM BLD-SCNC: 5.2 MMOL/L (ref 3.5–5.2)
SODIUM BLD-SCNC: 142 MMOL/L (ref 136–145)

## 2017-02-12 PROCEDURE — 94640 AIRWAY INHALATION TREATMENT: CPT

## 2017-02-12 PROCEDURE — G0378 HOSPITAL OBSERVATION PER HR: HCPCS

## 2017-02-12 PROCEDURE — 63710000001 PREDNISONE PER 5 MG: Performed by: HOSPITALIST

## 2017-02-12 PROCEDURE — 63710000001 INSULIN ASPART PER 5 UNITS: Performed by: INTERNAL MEDICINE

## 2017-02-12 PROCEDURE — 82962 GLUCOSE BLOOD TEST: CPT

## 2017-02-12 PROCEDURE — 94799 UNLISTED PULMONARY SVC/PX: CPT

## 2017-02-12 PROCEDURE — 25010000002 ENOXAPARIN PER 10 MG: Performed by: INTERNAL MEDICINE

## 2017-02-12 PROCEDURE — 83036 HEMOGLOBIN GLYCOSYLATED A1C: CPT | Performed by: HOSPITALIST

## 2017-02-12 PROCEDURE — 94620 HC PULMONARY STRESS TEST SIMPLE: CPT

## 2017-02-12 PROCEDURE — 96372 THER/PROPH/DIAG INJ SC/IM: CPT

## 2017-02-12 PROCEDURE — 80048 BASIC METABOLIC PNL TOTAL CA: CPT | Performed by: HOSPITALIST

## 2017-02-12 RX ORDER — OSELTAMIVIR PHOSPHATE 75 MG/1
75 CAPSULE ORAL EVERY 12 HOURS SCHEDULED
Qty: 6 CAPSULE | Refills: 0 | Status: SHIPPED | OUTPATIENT
Start: 2017-02-12 | End: 2017-02-15

## 2017-02-12 RX ORDER — ALBUTEROL SULFATE 90 UG/1
2 AEROSOL, METERED RESPIRATORY (INHALATION) EVERY 4 HOURS PRN
Qty: 6.7 G | Refills: 11 | Status: SHIPPED | OUTPATIENT
Start: 2017-02-12

## 2017-02-12 RX ORDER — PREDNISONE 20 MG/1
20 TABLET ORAL 2 TIMES DAILY WITH MEALS
Qty: 12 TABLET | Refills: 0 | Status: SHIPPED | OUTPATIENT
Start: 2017-02-12 | End: 2017-05-16

## 2017-02-12 RX ORDER — BENZONATATE 200 MG/1
200 CAPSULE ORAL 3 TIMES DAILY PRN
Qty: 21 CAPSULE | Refills: 0 | Status: SHIPPED | OUTPATIENT
Start: 2017-02-12 | End: 2017-05-16

## 2017-02-12 RX ORDER — GUAIFENESIN 600 MG/1
1200 TABLET, EXTENDED RELEASE ORAL 2 TIMES DAILY
Qty: 20 TABLET | Refills: 0 | Status: SHIPPED | OUTPATIENT
Start: 2017-02-12 | End: 2017-05-16

## 2017-02-12 RX ADMIN — IPRATROPIUM BROMIDE AND ALBUTEROL SULFATE 3 ML: .5; 3 SOLUTION RESPIRATORY (INHALATION) at 14:42

## 2017-02-12 RX ADMIN — OSELTAMIVIR PHOSPHATE 75 MG: 75 CAPSULE ORAL at 20:30

## 2017-02-12 RX ADMIN — GUAIFENESIN 1200 MG: 600 TABLET, EXTENDED RELEASE ORAL at 08:02

## 2017-02-12 RX ADMIN — BENZONATATE 200 MG: 200 CAPSULE, LIQUID FILLED ORAL at 20:30

## 2017-02-12 RX ADMIN — OSELTAMIVIR PHOSPHATE 75 MG: 75 CAPSULE ORAL at 08:02

## 2017-02-12 RX ADMIN — GUAIFENESIN 1200 MG: 600 TABLET, EXTENDED RELEASE ORAL at 19:43

## 2017-02-12 RX ADMIN — PREDNISONE 20 MG: 20 TABLET ORAL at 08:02

## 2017-02-12 RX ADMIN — ENOXAPARIN SODIUM 40 MG: 40 INJECTION SUBCUTANEOUS at 20:30

## 2017-02-12 RX ADMIN — IPRATROPIUM BROMIDE AND ALBUTEROL SULFATE 3 ML: .5; 3 SOLUTION RESPIRATORY (INHALATION) at 19:21

## 2017-02-12 RX ADMIN — IPRATROPIUM BROMIDE AND ALBUTEROL SULFATE 3 ML: .5; 3 SOLUTION RESPIRATORY (INHALATION) at 07:15

## 2017-02-12 RX ADMIN — INSULIN ASPART 2 UNITS: 100 INJECTION, SOLUTION INTRAVENOUS; SUBCUTANEOUS at 20:30

## 2017-02-12 RX ADMIN — IPRATROPIUM BROMIDE AND ALBUTEROL SULFATE 3 ML: .5; 3 SOLUTION RESPIRATORY (INHALATION) at 11:21

## 2017-02-12 RX ADMIN — LEVOTHYROXINE SODIUM 100 MCG: 100 TABLET ORAL at 10:08

## 2017-02-12 RX ADMIN — ASPIRIN 81 MG: 81 TABLET, CHEWABLE ORAL at 08:02

## 2017-02-12 RX ADMIN — PRAVASTATIN SODIUM 40 MG: 40 TABLET ORAL at 08:02

## 2017-02-12 RX ADMIN — PREDNISONE 20 MG: 20 TABLET ORAL at 19:43

## 2017-02-12 NOTE — PROGRESS NOTES
"DAILY PROGRESS NOTE  [facility]    Patient Identification:  Name: Db Hardin  Age: 66 y.o.  Sex: male  :  1950  MRN: [id number]         Primary Care Physician: MICHAEL Collado      Subjective  No new c/o.  States he is feeling better. States no SOA.     Objective:  General Appearance:  Comfortable, well-appearing, in no acute distress and not in pain.    Vital signs: (most recent): Blood pressure 123/78, pulse 72, temperature 97.3 °F (36.3 °C), temperature source Oral, resp. rate 16, height 66\" (167.6 cm), weight 148 lb (67.1 kg), SpO2 93 %.    Lungs:  Normal respiratory rate and normal effort.  He is not in respiratory distress.  No stridor.  There are rhonchi.  No wheezes, rales or decreased breath sounds.    Heart: Normal rate.  Regular rhythm.    Extremities: There is no dependent edema.    Neurological: Patient is alert and oriented to person, place and time.    Skin:  Warm and dry.                Vital signs in last 24 hours:  Temp:  [96.4 °F (35.8 °C)-97.4 °F (36.3 °C)] 97.3 °F (36.3 °C)  Heart Rate:  [67-81] 72  Resp:  [16-24] 16  BP: (106-123)/(64-78) 123/78    Intake/Output:  No intake or output data in the 24 hours ending 17 0933      Exam:    Physical Exam   Cardiovascular: Normal rate and regular rhythm.    Pulmonary/Chest: Effort normal. No stridor. No respiratory distress. He has no decreased breath sounds. He has no wheezes. He has rhonchi. He has no rales.   Neurological: He is alert.   Skin: Skin is warm and dry.         Results from last 7 days  Lab Units 02/10/17  1556   WBC 10*3/mm3 11.52*   HEMOGLOBIN g/dL 17.1   PLATELETS 10*3/mm3 207     Results from last 7 days  Lab Units 17  0607 17  0641 02/10/17  1556   SODIUM mmol/L 142 143 141   POTASSIUM mmol/L 5.2 4.0 4.3   CHLORIDE mmol/L 106 106 99   TOTAL CO2 mmol/L 23.0 24.0 26.5   BUN mg/dL 27* 40* 47*   CREATININE mg/dL 0.79 0.98 1.38*   GLUCOSE mg/dL 149* 90 117*   Estimated Creatinine " Clearance: 79.8 mL/min (by C-G formula based on Cr of 0.79).  Results from last 7 days  Lab Units 02/12/17  0607 02/11/17  0641 02/10/17  1556   CALCIUM mg/dL 8.6 8.1* 9.6   ALBUMIN g/dL  --   --  4.20     Results from last 7 days  Lab Units 02/10/17  1556   ALBUMIN g/dL 4.20   BILIRUBIN mg/dL 0.5   ALK PHOS U/L 62   AST (SGOT) U/L 46*   ALT (SGPT) U/L 25       Assessment:  Influenza A:  Tamiflu  COPD exacerbation:   Hypoxemia:  O2.    Hyperglycemia:  SSI.   Essential hypertension:  DESI (acute kidney injury) - Resolved.    CAD (coronary artery disease) - Stable.   Macrocytosis:       Plan:  Check Rm air and ambulatory O2 sats.  D/C planning.     Alessandro Saab MD  2/12/2017  9:33 AM

## 2017-02-12 NOTE — PROGRESS NOTES
Continued Stay Note  [facility]     Patient Name: Db Hardin  MRN: [id number]  Today's Date: 2/12/2017    Admit Date: 2/10/2017          Discharge Plan       02/12/17 7424    Case Management/Social Work Plan    Plan Home with O2 from [facility]    Patient/Family In Agreement With Plan yes    Additional Comments Obs letter documented 2-11-17 and patient aware. Spoke with patient, wife Carol Hardin ([phone]) and daughter Vicky Cole ([phone]) at bedside. Introduced self and explained CCP role. CCP explained pt requiring continous oxygen for dc today. Verified facesheet. Confirmed pharmacy is [facility] on [address]. Pt states he does not have a preferred DME company but his wife uses [facility] and that would be their choice. Called [facility] paged on call service [phone]; faxed orders and dc summary to [phone] . Pt states he is remaining independent in room and does not need any HH service. Pt denies any DC needs. [facility] JOYCE/CCP               Discharge Codes     None        Expected Discharge Date and Time     Expected Discharge Date Expected Discharge Time    Feb 12, 2017             Shaye Duong, RN

## 2017-02-12 NOTE — PROGRESS NOTES
Exercise Oximetry    Patient Name:Db Hardin   MRN: [id number]   Date: 02/12/17             ROOM AIR BASELINE   SpO2% 92   Heart Rate 80   Blood Pressure      EXERCISE ON ROOM AIR SpO2% EXERCISE ON O2 @ 2 LPM SpO2%   1 MINUTE 85 1 MINUTE 90   2 MINUTES  2 MINUTES 91   3 MINUTES  3 MINUTES 90   4 MINUTES  4 MINUTES 91   5 MINUTES  5 MINUTES 90   6 MINUTES  6 MINUTES 90              Distance Walked  Distance Walked   Dyspnea (Kita Scale)   Dyspnea (Kita Scale)   Fatigue (Kita Scale)   Fatigue (Kita Scale)   SpO2% Post Exercise   SpO2% Post Exercise   HR Post Exercise   HR Post Exercise   Time to Recovery   Time to Recovery     Comments: walked patient to the door on room air, o2 sat dropped to 85, fio2 applied at 2lpm, o2 sats 90 and patient did have soa while walking, he recovered well

## 2017-02-12 NOTE — PLAN OF CARE
Problem: Patient Care Overview (Adult)  Goal: Plan of Care Review  Outcome: Ongoing (interventions implemented as appropriate)    02/11/17 0441 02/12/17 0458   Coping/Psychosocial Response Interventions   Plan Of Care Reviewed With --  patient   Patient Care Overview   Progress --  no change   Outcome Evaluation   Outcome Summary/Follow up Plan pt. receiving IV fluids for rehydratin, VSS, denies NVD, c/o moderate headache. Pt. encouraged to increase oral fluid and food intake. Nonproductive congested cough. --          Problem: Renal Failure/Kidney Injury, Acute (Adult)  Goal: Signs and Symptoms of Listed Potential Problems Will be Absent or Manageable (Renal Failure/Kidney Injury, Acute)  Outcome: Ongoing (interventions implemented as appropriate)    02/12/17 0458   Renal Failure/Kidney Injury, Acute   Problems Assessed (Acute Renal Failure/Kidney Injury) fluid imbalance;electrolyte imbalance;acid-base imbalance;hematologic complications;hypertension;infection;pulmonary edema   Problems Present (Acute Renal Failure/Kidney Injury) hematologic complications;infection         Problem: Fluid Volume Deficit (Adult)  Goal: Identify Related Risk Factors and Signs and Symptoms  Outcome: Ongoing (interventions implemented as appropriate)    02/12/17 0458   Fluid Volume Deficit   Fluid Volume Deficit: Related Risk Factors infection/sepsis;pain   Signs and Symptoms (Fluid Volume Deficit) (none)       Goal: Fluid/Electrolyte Balance  Outcome: Ongoing (interventions implemented as appropriate)    02/12/17 0458   Fluid Volume Deficit (Adult)   Fluid/Electrolyte Balance achieves outcome       Goal: Comfort/Well Being  Outcome: Ongoing (interventions implemented as appropriate)    02/12/17 0458   Fluid Volume Deficit (Adult)   Comfort/Well Being making progress toward outcome         Problem: Diarrhea (Adult)  Goal: Identify Related Risk Factors and Signs and Symptoms  Outcome: Ongoing (interventions implemented as appropriate)     02/12/17 0458   Diarrhea   Diarrhea: Related Risk Factors dietary causes;infectious process;intestinal malabsorption   Signs and Symptoms (Diarrhea) headache;decreased appetite       Goal: Improved/Reduced Symptoms  Outcome: Ongoing (interventions implemented as appropriate)    02/12/17 0458   Diarrhea (Adult)   Improved/Reduced Symptoms making progress toward outcome

## 2017-02-12 NOTE — DISCHARGE SUMMARY
PHYSICIAN DISCHARGE SUMMARY                                                                        [facility]    Patient Identification:  Name: Db Hardin  Age: 66 y.o.  Sex: male  :  1950  MRN: [id number]  Primary Care Physician: MICHAEL Collado    Admit date: 2/10/2017  Discharge date and time: 2017     Discharged Condition: good    Discharge Diagnoses:   Influenza A:  Tamiflu  COPD exacerbation:   Hypoxemia:  O2.    Hyperglycemia:  SSI.   Essential hypertension:  DESI (acute kidney injury) - Resolved.    CAD (coronary artery disease) - Stable.   Macrocytosis:       Hospital Course:  66-year-old gentleman presenting with increasing shortness of air associated with chills, congestion, coughing, fever, myalgias.  Please H&P for full details.  Workup revealed the patient to be positive for influenza A.  Chest x-ray showed no infiltrates.  On evaluation following morning he also.  Venous COPD exacerbation.  The patient was started on Tamiflu on presentation.  He was supported with aerosol treatments and the following morning steroids were added to the regime.  He has improved markedly with this and feels much better today and anxious return home.  He was requiring 2 L nasal cannula initially but today is holding his own in the low 90s on room air.  He was noted to have elevated blood sugars probably secondary to steroids.  A1c however was within normal limits.  On presentation he was also clinically dehydrated and elevated BUN and creatinine which is resolved completely with IV fluid hydration and the patient is taking in orally very well at present.    Addendum:  Pt did fail O2 sats with ambulation.  Reported 85% on Rm air.  Will send home on home O2.      Addendum - 2017:  After leaving yesterday pt stated he was still feeling sick and that if he went home he would probably end up coming right back.  No  "change the last 24hrs but he states he feels much better today.    Consults:     Consults     Date and Time Order Name Status Description    2/10/2017 1649 [facility] (on-call MD unless specified) Completed             Discharge Exam:  Physical Exam   General Appearance: Comfortable, well-appearing, in no acute distress and not in pain.   Vital signs: (most recent): Blood pressure 123/78, pulse 72, temperature 97.3 °F (36.3 °C), temperature source Oral, resp. rate 16, height 66\" (167.6 cm), weight 148 lb (67.1 kg), SpO2 93 %.   Lungs: Normal respiratory rate and normal effort. He is not in respiratory distress. No stridor. There are rhonchi. No wheezes, rales or decreased breath sounds.   Heart: Normal rate. Regular rhythm.   Extremities: There is no dependent edema.   Neurological: Patient is alert and oriented to person, place and time.   Skin: Warm and dry.     Addendum 02/13/2017:  No change in exam.  Still with loose rhonchi but good air movement.      Disposition:  Home    Patient Instructions:    Db Hardin   Home Medication Instructions KEO:[id number]    Printed on:02/12/17 1012   Medication Information                      albuterol (PROVENTIL HFA;VENTOLIN HFA) 108 (90 BASE) MCG/ACT inhaler  Inhale 2 puffs Every 4 (Four) Hours As Needed for wheezing.             aspirin 81 MG tablet  Take  by mouth daily.             benzonatate (TESSALON) 200 MG capsule  Take 1 capsule by mouth 3 (Three) Times a Day As Needed for cough.             fluticasone-salmeterol (ADVAIR DISKUS) 250-50 MCG/DOSE DISKUS  Inhale 1 puff 2 (Two) Times a Day.             guaiFENesin (MUCINEX) 600 MG 12 hr tablet  Take 2 tablets by mouth 2 (Two) Times a Day.             levothyroxine (SYNTHROID, LEVOTHROID) 100 MCG tablet  Take 100 mcg by mouth daily.             lisinopril (PRINIVIL,ZESTRIL) 5 MG tablet  TAKE ONE TABLET BY MOUTH ONCE DAILY             oseltamivir (TAMIFLU) 75 MG capsule  Take 1 capsule by mouth Every 12 (Twelve) Hours " for 6 doses.             pravastatin (PRAVACHOL) 40 MG tablet  Take 40 mg by mouth Daily.             predniSONE (DELTASONE) 20 MG tablet  Take 1 tablet by mouth 2 (Two) Times a Day With Meals. Take 1 po bid for 4 days then 1 po daily for 4 days then d/c               No future appointments.    Discharge Order     Start     Ordered    02/12/17 1012  Discharge patient  Once     Expected Discharge Date:  02/12/17    Discharge Disposition:  Home or Self Care        02/12/17 1012                Signed:  Alessandro Saab MD  2/12/2017  10:12 AM

## 2017-02-13 VITALS
RESPIRATION RATE: 20 BRPM | SYSTOLIC BLOOD PRESSURE: 123 MMHG | HEIGHT: 66 IN | OXYGEN SATURATION: 87 % | WEIGHT: 148 LBS | HEART RATE: 88 BPM | TEMPERATURE: 97.3 F | BODY MASS INDEX: 23.78 KG/M2 | DIASTOLIC BLOOD PRESSURE: 69 MMHG

## 2017-02-13 LAB
ANION GAP SERPL CALCULATED.3IONS-SCNC: 11.4 MMOL/L
BASOPHILS # BLD AUTO: 0.02 10*3/MM3 (ref 0–0.2)
BASOPHILS NFR BLD AUTO: 0.2 % (ref 0–1.5)
BUN BLD-MCNC: 31 MG/DL (ref 8–23)
BUN/CREAT SERPL: 38.3 (ref 7–25)
CALCIUM SPEC-SCNC: 9.1 MG/DL (ref 8.6–10.5)
CHLORIDE SERPL-SCNC: 104 MMOL/L (ref 98–107)
CO2 SERPL-SCNC: 26.6 MMOL/L (ref 22–29)
CREAT BLD-MCNC: 0.81 MG/DL (ref 0.76–1.27)
DEPRECATED RDW RBC AUTO: 46.1 FL (ref 37–54)
EOSINOPHIL # BLD AUTO: 0 10*3/MM3 (ref 0–0.7)
EOSINOPHIL NFR BLD AUTO: 0 % (ref 0.3–6.2)
ERYTHROCYTE [DISTWIDTH] IN BLOOD BY AUTOMATED COUNT: 12.5 % (ref 11.5–14.5)
GFR SERPL CREATININE-BSD FRML MDRD: 95 ML/MIN/1.73
GLUCOSE BLD-MCNC: 111 MG/DL (ref 65–99)
GLUCOSE BLDC GLUCOMTR-MCNC: 120 MG/DL (ref 70–130)
GLUCOSE BLDC GLUCOMTR-MCNC: 124 MG/DL (ref 70–130)
HCT VFR BLD AUTO: 40.4 % (ref 40.4–52.2)
HGB BLD-MCNC: 13.8 G/DL (ref 13.7–17.6)
IMM GRANULOCYTES # BLD: 0.05 10*3/MM3 (ref 0–0.03)
IMM GRANULOCYTES NFR BLD: 0.4 % (ref 0–0.5)
LYMPHOCYTES # BLD AUTO: 0.8 10*3/MM3 (ref 0.9–4.8)
LYMPHOCYTES NFR BLD AUTO: 6.6 % (ref 19.6–45.3)
MCH RBC QN AUTO: 34.2 PG (ref 27–32.7)
MCHC RBC AUTO-ENTMCNC: 34.2 G/DL (ref 32.6–36.4)
MCV RBC AUTO: 100.2 FL (ref 79.8–96.2)
MONOCYTES # BLD AUTO: 0.86 10*3/MM3 (ref 0.2–1.2)
MONOCYTES NFR BLD AUTO: 7 % (ref 5–12)
NEUTROPHILS # BLD AUTO: 10.47 10*3/MM3 (ref 1.9–8.1)
NEUTROPHILS NFR BLD AUTO: 85.8 % (ref 42.7–76)
PLATELET # BLD AUTO: 192 10*3/MM3 (ref 140–500)
PMV BLD AUTO: 11 FL (ref 6–12)
POTASSIUM BLD-SCNC: 4 MMOL/L (ref 3.5–5.2)
PROCALCITONIN SERPL-MCNC: 0.05 NG/ML (ref 0.1–0.25)
RBC # BLD AUTO: 4.03 10*6/MM3 (ref 4.6–6)
SODIUM BLD-SCNC: 142 MMOL/L (ref 136–145)
WBC NRBC COR # BLD: 12.2 10*3/MM3 (ref 4.5–10.7)

## 2017-02-13 PROCEDURE — 80048 BASIC METABOLIC PNL TOTAL CA: CPT | Performed by: HOSPITALIST

## 2017-02-13 PROCEDURE — 63710000001 PREDNISONE PER 5 MG: Performed by: HOSPITALIST

## 2017-02-13 PROCEDURE — 84145 PROCALCITONIN (PCT): CPT | Performed by: HOSPITALIST

## 2017-02-13 PROCEDURE — 82962 GLUCOSE BLOOD TEST: CPT

## 2017-02-13 PROCEDURE — 94640 AIRWAY INHALATION TREATMENT: CPT

## 2017-02-13 PROCEDURE — 85025 COMPLETE CBC W/AUTO DIFF WBC: CPT | Performed by: HOSPITALIST

## 2017-02-13 PROCEDURE — 94799 UNLISTED PULMONARY SVC/PX: CPT

## 2017-02-13 PROCEDURE — G0378 HOSPITAL OBSERVATION PER HR: HCPCS

## 2017-02-13 RX ADMIN — PRAVASTATIN SODIUM 40 MG: 40 TABLET ORAL at 08:38

## 2017-02-13 RX ADMIN — PREDNISONE 20 MG: 20 TABLET ORAL at 08:39

## 2017-02-13 RX ADMIN — LEVOTHYROXINE SODIUM 100 MCG: 100 TABLET ORAL at 08:38

## 2017-02-13 RX ADMIN — IPRATROPIUM BROMIDE AND ALBUTEROL SULFATE 3 ML: .5; 3 SOLUTION RESPIRATORY (INHALATION) at 10:16

## 2017-02-13 RX ADMIN — IPRATROPIUM BROMIDE AND ALBUTEROL SULFATE 3 ML: .5; 3 SOLUTION RESPIRATORY (INHALATION) at 06:36

## 2017-02-13 RX ADMIN — ASPIRIN 81 MG: 81 TABLET, CHEWABLE ORAL at 08:39

## 2017-02-13 RX ADMIN — OSELTAMIVIR PHOSPHATE 75 MG: 75 CAPSULE ORAL at 08:38

## 2017-02-13 RX ADMIN — GUAIFENESIN 1200 MG: 600 TABLET, EXTENDED RELEASE ORAL at 08:38

## 2017-02-13 NOTE — PLAN OF CARE
Problem: Patient Care Overview (Adult)  Goal: Plan of Care Review  Outcome: Ongoing (interventions implemented as appropriate)    02/13/17 0542   Patient Care Overview   Progress no change   Outcome Evaluation   Outcome Summary/Follow up Plan Pt appeared to sleep fair, vss, reports soa with exertion, remains on o2 at 2l, was to be d/c'd, but continued to have congested productive cough, probable d/c today

## 2017-02-13 NOTE — PLAN OF CARE
Problem: Patient Care Overview (Adult)  Goal: Plan of Care Review  Outcome: Outcome(s) achieved Date Met:  02/13/17 02/13/17 4276   Coping/Psychosocial Response Interventions   Plan Of Care Reviewed With patient;spouse;son   Patient Care Overview   Progress improving   Outcome Evaluation   Outcome Summary/Follow up Plan HOME WITH O2. DISCHARGE ORDERS, MEDS AND FOLLOWUPS EXPLAINED WITH VERBAILIZED UNDERSTANDING

## 2017-02-14 NOTE — PROGRESS NOTES
Continued Stay Note  [facility]     Patient Name: Db Hardin  MRN: [id number]  Today's Date: 2/14/2017    Admit Date: 2/10/2017          Discharge Plan       02/14/17 0745    Case Management/Social Work Plan    Plan Home with O2 from [place]    Patient/Family In Agreement With Plan unable to assess    Final Note    Final Note Home              Discharge Codes       02/14/17 0746    Discharge Codes    Discharge Codes 01  Discharge to home        Expected Discharge Date and Time     Expected Discharge Date Expected Discharge Time    Feb 13, 2017             Gely Boudreaux RN

## 2017-02-27 RX ORDER — LISINOPRIL 5 MG/1
TABLET ORAL
Qty: 90 TABLET | Refills: 3 | Status: SHIPPED | OUTPATIENT
Start: 2017-02-27 | End: 2017-05-17 | Stop reason: HOSPADM

## 2017-05-16 ENCOUNTER — APPOINTMENT (OUTPATIENT)
Dept: GENERAL RADIOLOGY | Facility: HOSPITAL | Age: 67
End: 2017-05-16

## 2017-05-16 ENCOUNTER — OFFICE VISIT (OUTPATIENT)
Dept: CARDIOLOGY | Facility: CLINIC | Age: 67
End: 2017-05-16

## 2017-05-16 VITALS
WEIGHT: 148 LBS | BODY MASS INDEX: 23.78 KG/M2 | SYSTOLIC BLOOD PRESSURE: 110 MMHG | OXYGEN SATURATION: 100 % | HEIGHT: 66 IN | DIASTOLIC BLOOD PRESSURE: 80 MMHG | HEART RATE: 66 BPM

## 2017-05-16 DIAGNOSIS — I10 ESSENTIAL HYPERTENSION: ICD-10-CM

## 2017-05-16 DIAGNOSIS — I25.10 CORONARY ARTERY DISEASE INVOLVING NATIVE CORONARY ARTERY OF NATIVE HEART WITHOUT ANGINA PECTORIS: ICD-10-CM

## 2017-05-16 DIAGNOSIS — E78.5 HYPERLIPIDEMIA, UNSPECIFIED HYPERLIPIDEMIA TYPE: ICD-10-CM

## 2017-05-16 DIAGNOSIS — R06.09 DYSPNEA ON EXERTION: ICD-10-CM

## 2017-05-16 DIAGNOSIS — Z95.5 S/P CORONARY ARTERY STENT PLACEMENT: Primary | ICD-10-CM

## 2017-05-16 LAB
ALBUMIN SERPL-MCNC: 4.6 G/DL (ref 3.5–5.2)
ALBUMIN/GLOB SERPL: 1.5 G/DL
ALP SERPL-CCNC: 90 U/L (ref 39–117)
ALT SERPL W P-5'-P-CCNC: 16 U/L (ref 1–41)
ANION GAP SERPL CALCULATED.3IONS-SCNC: 14.8 MMOL/L
AST SERPL-CCNC: 21 U/L (ref 1–40)
BASOPHILS # BLD AUTO: 0.02 10*3/MM3 (ref 0–0.2)
BASOPHILS NFR BLD AUTO: 0.2 % (ref 0–1.5)
BILIRUB SERPL-MCNC: 0.5 MG/DL (ref 0.1–1.2)
BUN BLD-MCNC: 28 MG/DL (ref 8–23)
BUN/CREAT SERPL: 15.2 (ref 7–25)
CALCIUM SPEC-SCNC: 10.2 MG/DL (ref 8.6–10.5)
CHLORIDE SERPL-SCNC: 100 MMOL/L (ref 98–107)
CO2 SERPL-SCNC: 25.2 MMOL/L (ref 22–29)
CREAT BLD-MCNC: 1.84 MG/DL (ref 0.76–1.27)
DEPRECATED RDW RBC AUTO: 47.2 FL (ref 37–54)
EOSINOPHIL # BLD AUTO: 0.02 10*3/MM3 (ref 0–0.7)
EOSINOPHIL NFR BLD AUTO: 0.2 % (ref 0.3–6.2)
ERYTHROCYTE [DISTWIDTH] IN BLOOD BY AUTOMATED COUNT: 12.9 % (ref 11.5–14.5)
GFR SERPL CREATININE-BSD FRML MDRD: 37 ML/MIN/1.73
GLOBULIN UR ELPH-MCNC: 3.1 GM/DL
GLUCOSE BLD-MCNC: 97 MG/DL (ref 65–99)
HCT VFR BLD AUTO: 45.6 % (ref 40.4–52.2)
HGB BLD-MCNC: 16 G/DL (ref 13.7–17.6)
HOLD SPECIMEN: NORMAL
IMM GRANULOCYTES # BLD: 0.03 10*3/MM3 (ref 0–0.03)
IMM GRANULOCYTES NFR BLD: 0.2 % (ref 0–0.5)
LYMPHOCYTES # BLD AUTO: 1.65 10*3/MM3 (ref 0.9–4.8)
LYMPHOCYTES NFR BLD AUTO: 12.5 % (ref 19.6–45.3)
MCH RBC QN AUTO: 35.2 PG (ref 27–32.7)
MCHC RBC AUTO-ENTMCNC: 35.1 G/DL (ref 32.6–36.4)
MCV RBC AUTO: 100.2 FL (ref 79.8–96.2)
MONOCYTES # BLD AUTO: 1.06 10*3/MM3 (ref 0.2–1.2)
MONOCYTES NFR BLD AUTO: 8 % (ref 5–12)
NEUTROPHILS # BLD AUTO: 10.46 10*3/MM3 (ref 1.9–8.1)
NEUTROPHILS NFR BLD AUTO: 78.9 % (ref 42.7–76)
PLATELET # BLD AUTO: 217 10*3/MM3 (ref 140–500)
PMV BLD AUTO: 10.7 FL (ref 6–12)
POTASSIUM BLD-SCNC: 4.5 MMOL/L (ref 3.5–5.2)
PROT SERPL-MCNC: 7.7 G/DL (ref 6–8.5)
RBC # BLD AUTO: 4.55 10*6/MM3 (ref 4.6–6)
SODIUM BLD-SCNC: 140 MMOL/L (ref 136–145)
TROPONIN T SERPL-MCNC: <0.01 NG/ML (ref 0–0.03)
WBC NRBC COR # BLD: 13.24 10*3/MM3 (ref 4.5–10.7)
WHOLE BLOOD HOLD SPECIMEN: NORMAL

## 2017-05-16 PROCEDURE — 36415 COLL VENOUS BLD VENIPUNCTURE: CPT | Performed by: EMERGENCY MEDICINE

## 2017-05-16 PROCEDURE — 99284 EMERGENCY DEPT VISIT MOD MDM: CPT

## 2017-05-16 PROCEDURE — 85025 COMPLETE CBC W/AUTO DIFF WBC: CPT | Performed by: EMERGENCY MEDICINE

## 2017-05-16 PROCEDURE — 93000 ELECTROCARDIOGRAM COMPLETE: CPT | Performed by: PHYSICIAN ASSISTANT

## 2017-05-16 PROCEDURE — 71020 HC CHEST PA AND LATERAL: CPT

## 2017-05-16 PROCEDURE — 80053 COMPREHEN METABOLIC PANEL: CPT | Performed by: EMERGENCY MEDICINE

## 2017-05-16 PROCEDURE — 99214 OFFICE O/P EST MOD 30 MIN: CPT | Performed by: PHYSICIAN ASSISTANT

## 2017-05-16 PROCEDURE — 93010 ELECTROCARDIOGRAM REPORT: CPT | Performed by: INTERNAL MEDICINE

## 2017-05-16 PROCEDURE — 84484 ASSAY OF TROPONIN QUANT: CPT | Performed by: EMERGENCY MEDICINE

## 2017-05-16 PROCEDURE — 93005 ELECTROCARDIOGRAM TRACING: CPT

## 2017-05-16 RX ORDER — SODIUM CHLORIDE 0.9 % (FLUSH) 0.9 %
10 SYRINGE (ML) INJECTION AS NEEDED
Status: DISCONTINUED | OUTPATIENT
Start: 2017-05-16 | End: 2017-05-17 | Stop reason: HOSPADM

## 2017-05-16 RX ORDER — ASPIRIN 325 MG
325 TABLET ORAL ONCE
Status: DISCONTINUED | OUTPATIENT
Start: 2017-05-16 | End: 2017-05-17 | Stop reason: HOSPADM

## 2017-05-17 ENCOUNTER — APPOINTMENT (OUTPATIENT)
Dept: NUCLEAR MEDICINE | Facility: HOSPITAL | Age: 67
End: 2017-05-17

## 2017-05-17 ENCOUNTER — HOSPITAL ENCOUNTER (INPATIENT)
Facility: HOSPITAL | Age: 67
LOS: 1 days | Discharge: HOME | End: 2017-05-17
Attending: EMERGENCY MEDICINE | Admitting: INTERNAL MEDICINE

## 2017-05-17 VITALS
BODY MASS INDEX: 23.33 KG/M2 | OXYGEN SATURATION: 97 % | HEIGHT: 66 IN | WEIGHT: 145.2 LBS | DIASTOLIC BLOOD PRESSURE: 82 MMHG | HEART RATE: 66 BPM | RESPIRATION RATE: 18 BRPM | TEMPERATURE: 97.3 F | SYSTOLIC BLOOD PRESSURE: 121 MMHG

## 2017-05-17 DIAGNOSIS — R07.9 CHEST PAIN, UNSPECIFIED TYPE: Primary | ICD-10-CM

## 2017-05-17 LAB
BH CV STRESS COMMENTS STAGE 1: NORMAL
BH CV STRESS DOSE REGADENOSON STAGE 1: 0.4
BH CV STRESS DURATION MIN STAGE 1: 0
BH CV STRESS DURATION SEC STAGE 1: 15
BH CV STRESS PROTOCOL 1: NORMAL
BH CV STRESS RECOVERY BP: NORMAL MMHG
BH CV STRESS RECOVERY HR: 77 BPM
BH CV STRESS STAGE 1: 1
LV EF NUC BP: 66 %
MAXIMAL PREDICTED HEART RATE: 152 BPM
PERCENT MAX PREDICTED HR: 63.16 %
STRESS BASELINE BP: NORMAL MMHG
STRESS BASELINE HR: 66 BPM
STRESS PERCENT HR: 74 %
STRESS POST PEAK BP: NORMAL MMHG
STRESS POST PEAK HR: 96 BPM
STRESS TARGET HR: 129 BPM
TROPONIN T SERPL-MCNC: <0.01 NG/ML (ref 0–0.03)

## 2017-05-17 PROCEDURE — 93016 CV STRESS TEST SUPVJ ONLY: CPT | Performed by: INTERNAL MEDICINE

## 2017-05-17 PROCEDURE — 93018 CV STRESS TEST I&R ONLY: CPT | Performed by: INTERNAL MEDICINE

## 2017-05-17 PROCEDURE — 84484 ASSAY OF TROPONIN QUANT: CPT | Performed by: INTERNAL MEDICINE

## 2017-05-17 PROCEDURE — A9500 TC99M SESTAMIBI: HCPCS | Performed by: INTERNAL MEDICINE

## 2017-05-17 PROCEDURE — 25010000002 REGADENOSON 0.4 MG/5ML SOLUTION: Performed by: INTERNAL MEDICINE

## 2017-05-17 PROCEDURE — 99235 HOSP IP/OBS SAME DATE MOD 70: CPT | Performed by: INTERNAL MEDICINE

## 2017-05-17 PROCEDURE — 93017 CV STRESS TEST TRACING ONLY: CPT

## 2017-05-17 PROCEDURE — 78452 HT MUSCLE IMAGE SPECT MULT: CPT | Performed by: INTERNAL MEDICINE

## 2017-05-17 PROCEDURE — 78452 HT MUSCLE IMAGE SPECT MULT: CPT

## 2017-05-17 PROCEDURE — 93010 ELECTROCARDIOGRAM REPORT: CPT | Performed by: INTERNAL MEDICINE

## 2017-05-17 PROCEDURE — 0 TECHNETIUM SESTAMIBI: Performed by: INTERNAL MEDICINE

## 2017-05-17 PROCEDURE — 93005 ELECTROCARDIOGRAM TRACING: CPT | Performed by: INTERNAL MEDICINE

## 2017-05-17 RX ADMIN — REGADENOSON 0.4 MG: 0.08 INJECTION, SOLUTION INTRAVENOUS at 11:36

## 2017-05-17 RX ADMIN — Medication 1 DOSE: at 11:37

## 2017-05-17 RX ADMIN — Medication 1 DOSE: at 08:40

## 2017-06-02 ENCOUNTER — APPOINTMENT (OUTPATIENT)
Dept: GENERAL RADIOLOGY | Facility: HOSPITAL | Age: 67
End: 2017-06-02

## 2017-06-02 ENCOUNTER — APPOINTMENT (OUTPATIENT)
Dept: CT IMAGING | Facility: HOSPITAL | Age: 67
End: 2017-06-02

## 2017-06-02 ENCOUNTER — HOSPITAL ENCOUNTER (INPATIENT)
Facility: HOSPITAL | Age: 67
LOS: 3 days | Discharge: HOME | End: 2017-06-05
Attending: FAMILY MEDICINE | Admitting: INTERNAL MEDICINE

## 2017-06-02 DIAGNOSIS — R55 SYNCOPE AND COLLAPSE: Primary | ICD-10-CM

## 2017-06-02 DIAGNOSIS — R50.9 FEVER CHILLS: ICD-10-CM

## 2017-06-02 DIAGNOSIS — K04.7 PERIAPICAL ABSCESS: ICD-10-CM

## 2017-06-02 DIAGNOSIS — N39.0 ACUTE UTI: ICD-10-CM

## 2017-06-02 LAB
ALBUMIN SERPL-MCNC: 3.8 G/DL (ref 3.5–5.2)
ALBUMIN/GLOB SERPL: 1.4 G/DL
ALP SERPL-CCNC: 102 U/L (ref 39–117)
ALT SERPL W P-5'-P-CCNC: 37 U/L (ref 1–41)
AMORPH URATE CRY URNS QL MICRO: ABNORMAL /HPF
ANION GAP SERPL CALCULATED.3IONS-SCNC: 16.2 MMOL/L
AST SERPL-CCNC: 62 U/L (ref 1–40)
BACTERIA UR QL AUTO: ABNORMAL /HPF
BASOPHILS # BLD AUTO: 0.03 10*3/MM3 (ref 0–0.2)
BASOPHILS NFR BLD AUTO: 0.6 % (ref 0–1.5)
BILIRUB SERPL-MCNC: 0.9 MG/DL (ref 0.1–1.2)
BILIRUB UR QL STRIP: NEGATIVE
BUN BLD-MCNC: 21 MG/DL (ref 8–23)
BUN/CREAT SERPL: 18.9 (ref 7–25)
CALCIUM SPEC-SCNC: 9.2 MG/DL (ref 8.6–10.5)
CHLORIDE SERPL-SCNC: 101 MMOL/L (ref 98–107)
CLARITY UR: ABNORMAL
CO2 SERPL-SCNC: 23.8 MMOL/L (ref 22–29)
COD CRY URNS QL: ABNORMAL /HPF
COLOR UR: ABNORMAL
CREAT BLD-MCNC: 1.11 MG/DL (ref 0.76–1.27)
CRP SERPL-MCNC: 1.6 MG/DL (ref 0–0.5)
D-LACTATE SERPL-SCNC: 1.3 MMOL/L (ref 0.5–2)
DEPRECATED RDW RBC AUTO: 46.6 FL (ref 37–54)
EOSINOPHIL # BLD AUTO: 0.01 10*3/MM3 (ref 0–0.7)
EOSINOPHIL NFR BLD AUTO: 0.2 % (ref 0.3–6.2)
ERYTHROCYTE [DISTWIDTH] IN BLOOD BY AUTOMATED COUNT: 12.8 % (ref 11.5–14.5)
ERYTHROCYTE [SEDIMENTATION RATE] IN BLOOD: 2 MM/HR (ref 0–20)
GFR SERPL CREATININE-BSD FRML MDRD: 66 ML/MIN/1.73
GLOBULIN UR ELPH-MCNC: 2.8 GM/DL
GLUCOSE BLD-MCNC: 141 MG/DL (ref 65–99)
GLUCOSE UR STRIP-MCNC: NEGATIVE MG/DL
HCT VFR BLD AUTO: 47.5 % (ref 40.4–52.2)
HGB BLD-MCNC: 16.7 G/DL (ref 13.7–17.6)
HGB UR QL STRIP.AUTO: NEGATIVE
HOLD SPECIMEN: NORMAL
HYALINE CASTS UR QL AUTO: ABNORMAL /LPF
IMM GRANULOCYTES # BLD: 0 10*3/MM3 (ref 0–0.03)
IMM GRANULOCYTES NFR BLD: 0 % (ref 0–0.5)
KETONES UR QL STRIP: ABNORMAL
LEUKOCYTE ESTERASE UR QL STRIP.AUTO: ABNORMAL
LYMPHOCYTES # BLD AUTO: 0.72 10*3/MM3 (ref 0.9–4.8)
LYMPHOCYTES NFR BLD AUTO: 14.2 % (ref 19.6–45.3)
MCH RBC QN AUTO: 34.5 PG (ref 27–32.7)
MCHC RBC AUTO-ENTMCNC: 35.2 G/DL (ref 32.6–36.4)
MCV RBC AUTO: 98.1 FL (ref 79.8–96.2)
MONOCYTES # BLD AUTO: 0.29 10*3/MM3 (ref 0.2–1.2)
MONOCYTES NFR BLD AUTO: 5.7 % (ref 5–12)
NEUTROPHILS # BLD AUTO: 4.02 10*3/MM3 (ref 1.9–8.1)
NEUTROPHILS NFR BLD AUTO: 79.3 % (ref 42.7–76)
NITRITE UR QL STRIP: POSITIVE
PH UR STRIP.AUTO: <=5 [PH] (ref 5–8)
PLATELET # BLD AUTO: 109 10*3/MM3 (ref 140–500)
PMV BLD AUTO: 10.6 FL (ref 6–12)
POTASSIUM BLD-SCNC: 3.9 MMOL/L (ref 3.5–5.2)
PROCALCITONIN SERPL-MCNC: 0.36 NG/ML (ref 0.1–0.25)
PROT SERPL-MCNC: 6.6 G/DL (ref 6–8.5)
PROT UR QL STRIP: ABNORMAL
RBC # BLD AUTO: 4.84 10*6/MM3 (ref 4.6–6)
RBC # UR: ABNORMAL /HPF
REF LAB TEST METHOD: ABNORMAL
SODIUM BLD-SCNC: 141 MMOL/L (ref 136–145)
SP GR UR STRIP: >=1.03 (ref 1–1.03)
SQUAMOUS #/AREA URNS HPF: ABNORMAL /HPF
TROPONIN T SERPL-MCNC: <0.01 NG/ML (ref 0–0.03)
UROBILINOGEN UR QL STRIP: ABNORMAL
WBC NRBC COR # BLD: 5.07 10*3/MM3 (ref 4.5–10.7)
WBC UR QL AUTO: ABNORMAL /HPF
WHOLE BLOOD HOLD SPECIMEN: NORMAL

## 2017-06-02 PROCEDURE — 85025 COMPLETE CBC W/AUTO DIFF WBC: CPT | Performed by: FAMILY MEDICINE

## 2017-06-02 PROCEDURE — 87086 URINE CULTURE/COLONY COUNT: CPT | Performed by: FAMILY MEDICINE

## 2017-06-02 PROCEDURE — 93010 ELECTROCARDIOGRAM REPORT: CPT | Performed by: INTERNAL MEDICINE

## 2017-06-02 PROCEDURE — 99284 EMERGENCY DEPT VISIT MOD MDM: CPT

## 2017-06-02 PROCEDURE — 83605 ASSAY OF LACTIC ACID: CPT | Performed by: FAMILY MEDICINE

## 2017-06-02 PROCEDURE — 25010000003 CEFTRIAXONE PER 250 MG

## 2017-06-02 PROCEDURE — 25010000002 VANCOMYCIN: Performed by: INTERNAL MEDICINE

## 2017-06-02 PROCEDURE — 81001 URINALYSIS AUTO W/SCOPE: CPT | Performed by: FAMILY MEDICINE

## 2017-06-02 PROCEDURE — 71020 HC CHEST PA AND LATERAL: CPT

## 2017-06-02 PROCEDURE — 85652 RBC SED RATE AUTOMATED: CPT | Performed by: FAMILY MEDICINE

## 2017-06-02 PROCEDURE — 36415 COLL VENOUS BLD VENIPUNCTURE: CPT | Performed by: FAMILY MEDICINE

## 2017-06-02 PROCEDURE — 84484 ASSAY OF TROPONIN QUANT: CPT | Performed by: FAMILY MEDICINE

## 2017-06-02 PROCEDURE — 84145 PROCALCITONIN (PCT): CPT | Performed by: FAMILY MEDICINE

## 2017-06-02 PROCEDURE — 70450 CT HEAD/BRAIN W/O DYE: CPT

## 2017-06-02 PROCEDURE — 84484 ASSAY OF TROPONIN QUANT: CPT | Performed by: INTERNAL MEDICINE

## 2017-06-02 PROCEDURE — 99222 1ST HOSP IP/OBS MODERATE 55: CPT | Performed by: INTERNAL MEDICINE

## 2017-06-02 PROCEDURE — 80053 COMPREHEN METABOLIC PANEL: CPT | Performed by: FAMILY MEDICINE

## 2017-06-02 PROCEDURE — 87040 BLOOD CULTURE FOR BACTERIA: CPT | Performed by: FAMILY MEDICINE

## 2017-06-02 PROCEDURE — 93005 ELECTROCARDIOGRAM TRACING: CPT

## 2017-06-02 PROCEDURE — 86140 C-REACTIVE PROTEIN: CPT | Performed by: FAMILY MEDICINE

## 2017-06-02 PROCEDURE — 25010000002 ENOXAPARIN PER 10 MG: Performed by: INTERNAL MEDICINE

## 2017-06-02 PROCEDURE — 70355 PANORAMIC X-RAY OF JAWS: CPT

## 2017-06-02 RX ORDER — ONDANSETRON 4 MG/1
4 TABLET, ORALLY DISINTEGRATING ORAL EVERY 6 HOURS PRN
Status: DISCONTINUED | OUTPATIENT
Start: 2017-06-02 | End: 2017-06-05 | Stop reason: HOSPADM

## 2017-06-02 RX ORDER — NITROGLYCERIN 0.4 MG/1
0.4 TABLET SUBLINGUAL
Status: DISCONTINUED | OUTPATIENT
Start: 2017-06-02 | End: 2017-06-05 | Stop reason: HOSPADM

## 2017-06-02 RX ORDER — SODIUM CHLORIDE 0.9 % (FLUSH) 0.9 %
10 SYRINGE (ML) INJECTION AS NEEDED
Status: DISCONTINUED | OUTPATIENT
Start: 2017-06-02 | End: 2017-06-05 | Stop reason: HOSPADM

## 2017-06-02 RX ORDER — LEVOTHYROXINE SODIUM 0.1 MG/1
100 TABLET ORAL
Status: DISCONTINUED | OUTPATIENT
Start: 2017-06-03 | End: 2017-06-04

## 2017-06-02 RX ORDER — HYDROCODONE BITARTRATE AND ACETAMINOPHEN 7.5; 325 MG/1; MG/1
1 TABLET ORAL ONCE
Status: COMPLETED | OUTPATIENT
Start: 2017-06-02 | End: 2017-06-02

## 2017-06-02 RX ORDER — ONDANSETRON 4 MG/1
4 TABLET, FILM COATED ORAL EVERY 6 HOURS PRN
Status: DISCONTINUED | OUTPATIENT
Start: 2017-06-02 | End: 2017-06-05 | Stop reason: HOSPADM

## 2017-06-02 RX ORDER — CEFTRIAXONE SODIUM 2 G/50ML
2 INJECTION, SOLUTION INTRAVENOUS EVERY 24 HOURS
Status: DISCONTINUED | OUTPATIENT
Start: 2017-06-02 | End: 2017-06-02

## 2017-06-02 RX ORDER — SENNA AND DOCUSATE SODIUM 50; 8.6 MG/1; MG/1
2 TABLET, FILM COATED ORAL NIGHTLY PRN
Status: DISCONTINUED | OUTPATIENT
Start: 2017-06-02 | End: 2017-06-05 | Stop reason: HOSPADM

## 2017-06-02 RX ORDER — CEFTRIAXONE SODIUM 1 G/50ML
1 INJECTION, SOLUTION INTRAVENOUS ONCE
Status: COMPLETED | OUTPATIENT
Start: 2017-06-02 | End: 2017-06-02

## 2017-06-02 RX ORDER — HYDROCODONE BITARTRATE AND ACETAMINOPHEN 5; 325 MG/1; MG/1
1 TABLET ORAL EVERY 4 HOURS PRN
Status: DISCONTINUED | OUTPATIENT
Start: 2017-06-02 | End: 2017-06-05 | Stop reason: HOSPADM

## 2017-06-02 RX ORDER — ASPIRIN 325 MG
325 TABLET ORAL ONCE
Status: COMPLETED | OUTPATIENT
Start: 2017-06-02 | End: 2017-06-02

## 2017-06-02 RX ORDER — SODIUM CHLORIDE 0.9 % (FLUSH) 0.9 %
1-10 SYRINGE (ML) INJECTION AS NEEDED
Status: DISCONTINUED | OUTPATIENT
Start: 2017-06-02 | End: 2017-06-05 | Stop reason: HOSPADM

## 2017-06-02 RX ORDER — ACETAMINOPHEN 325 MG/1
650 TABLET ORAL EVERY 4 HOURS PRN
Status: DISCONTINUED | OUTPATIENT
Start: 2017-06-02 | End: 2017-06-05 | Stop reason: HOSPADM

## 2017-06-02 RX ORDER — ATORVASTATIN CALCIUM 40 MG/1
80 TABLET, FILM COATED ORAL DAILY
Status: DISCONTINUED | OUTPATIENT
Start: 2017-06-03 | End: 2017-06-05 | Stop reason: HOSPADM

## 2017-06-02 RX ORDER — ALBUTEROL SULFATE 90 UG/1
2 AEROSOL, METERED RESPIRATORY (INHALATION) EVERY 4 HOURS PRN
Status: DISCONTINUED | OUTPATIENT
Start: 2017-06-02 | End: 2017-06-02 | Stop reason: CLARIF

## 2017-06-02 RX ORDER — ASPIRIN 81 MG/1
81 TABLET ORAL DAILY
Status: DISCONTINUED | OUTPATIENT
Start: 2017-06-03 | End: 2017-06-05 | Stop reason: HOSPADM

## 2017-06-02 RX ORDER — CEFTRIAXONE SODIUM 1 G/50ML
INJECTION, SOLUTION INTRAVENOUS
Status: COMPLETED
Start: 2017-06-02 | End: 2017-06-02

## 2017-06-02 RX ORDER — SODIUM CHLORIDE 9 MG/ML
125 INJECTION, SOLUTION INTRAVENOUS CONTINUOUS
Status: DISCONTINUED | OUTPATIENT
Start: 2017-06-02 | End: 2017-06-04

## 2017-06-02 RX ORDER — ALBUTEROL SULFATE 2.5 MG/3ML
2.5 SOLUTION RESPIRATORY (INHALATION) EVERY 4 HOURS PRN
Status: DISCONTINUED | OUTPATIENT
Start: 2017-06-02 | End: 2017-06-05 | Stop reason: HOSPADM

## 2017-06-02 RX ORDER — ONDANSETRON 2 MG/ML
4 INJECTION INTRAMUSCULAR; INTRAVENOUS EVERY 6 HOURS PRN
Status: DISCONTINUED | OUTPATIENT
Start: 2017-06-02 | End: 2017-06-05 | Stop reason: HOSPADM

## 2017-06-02 RX ORDER — CEFTRIAXONE SODIUM 2 G/50ML
2 INJECTION, SOLUTION INTRAVENOUS EVERY 24 HOURS
Status: DISCONTINUED | OUTPATIENT
Start: 2017-06-03 | End: 2017-06-04

## 2017-06-02 RX ORDER — VANCOMYCIN HYDROCHLORIDE 1 G/200ML
1000 INJECTION, SOLUTION INTRAVENOUS EVERY 12 HOURS
Status: DISCONTINUED | OUTPATIENT
Start: 2017-06-03 | End: 2017-06-03

## 2017-06-02 RX ADMIN — SODIUM CHLORIDE 125 ML/HR: 9 INJECTION, SOLUTION INTRAVENOUS at 16:37

## 2017-06-02 RX ADMIN — SODIUM CHLORIDE 500 ML: 9 INJECTION, SOLUTION INTRAVENOUS at 16:36

## 2017-06-02 RX ADMIN — ASPIRIN 325 MG: 325 TABLET ORAL at 16:36

## 2017-06-02 RX ADMIN — VANCOMYCIN HYDROCHLORIDE 1250 MG: 1 INJECTION, POWDER, LYOPHILIZED, FOR SOLUTION INTRAVENOUS at 23:36

## 2017-06-02 RX ADMIN — CEFTRIAXONE SODIUM 1 G: 1 INJECTION, SOLUTION INTRAVENOUS at 18:43

## 2017-06-02 RX ADMIN — HYDROCODONE BITARTRATE AND ACETAMINOPHEN 1 TABLET: 7.5; 325 TABLET ORAL at 18:42

## 2017-06-02 RX ADMIN — HYDROCODONE BITARTRATE AND ACETAMINOPHEN 1 TABLET: 5; 325 TABLET ORAL at 23:42

## 2017-06-02 RX ADMIN — ENOXAPARIN SODIUM 40 MG: 40 INJECTION SUBCUTANEOUS at 22:36

## 2017-06-02 NOTE — ED PROVIDER NOTES
EMERGENCY DEPARTMENT ENCOUNTER    CHIEF COMPLAINT  Chief Complaint: syncope  History given by: pt/family  History limited by: none  Room Number: 28/28  PMD: Carol ZuñigaMICHAEL Pierre      HPI:  Pt is a 66 y.o. male who presents complaining of syncope this morning while in the kitchen. There was prodromal lightheadedness for 1 second. Symptoms were followed by weakness, tremors and inability to ambulate normally. Pt does not recall any sustained injuries but his wife states she found blood on the refrigerator. He reports having fever, chills, mild headache and diaphoresis the previous night.  There is also some palpable tenderness to the L sternal border w/ movement. There is no associated N/V, tooth pain or urinary sx. Pt has 3 stents inserted.     Duration: since sometime this morning  Onset: sudden  Timing: episodic  Intensity/Severity: moderate  Progression: resolved  Associated Symptoms: lightheadedness, weakness, tremors, inability to ambulate, fever, chills, mild headache, diaphoresis, L sternal border tenderness  Aggravating Factors: L sternal tenderness  Alleviating Factors: none stated  Previous Episodes: Pt has 3 stents inserted  Treatment before arrival: none stated    PAST MEDICAL HISTORY  Active Ambulatory Problems     Diagnosis Date Noted   • S/P coronary artery stent placement 05/06/2016   • Diabetes mellitus without complication 05/06/2016   • Hypertension 05/06/2016   • Influenza A 02/10/2017   • DESI (acute kidney injury) 02/10/2017   • CAD (coronary artery disease) 02/10/2017   • Disorder of joint 11/24/2015   • Chronic obstructive pulmonary disease 06/02/2014   • Hyperlipidemia 06/02/2014   • Osteoarthritis of knee 05/03/2016   • Chest pain 05/17/2017     Resolved Ambulatory Problems     Diagnosis Date Noted   • ST elevation myocardial infarction involving left anterior descending (LAD) coronary artery 05/06/2016     Past Medical History:   Diagnosis Date   • CAD (coronary artery disease)    •  COPD (chronic obstructive pulmonary disease)    • Diabetes    • Hyperlipidemia    • Myocardial infarction 03/2011   • Sinus bradycardia    • ST elevation myocardial infarction involving left anterior descending (LAD) coronary artery 5/6/2016       PAST SURGICAL HISTORY  Past Surgical History:   Procedure Laterality Date   • CARDIAC CATHETERIZATION      2011:3.0x15mm Vision pLAD; 3.5x20mm Vision p circ; 3.5x28mm Vision to mRCA   • CORONARY ANGIOPLASTY     • CORONARY STENT PLACEMENT      2011:3.0x15mm Vision pLAD; 3.5x20mm Vision p circ; 3.5x28mm Vision to mRCA       FAMILY HISTORY  Family History   Problem Relation Age of Onset   • No Known Problems Mother    • Cancer Father    • No Known Problems Sister    • No Known Problems Brother    • No Known Problems Maternal Grandmother    • No Known Problems Maternal Grandfather    • No Known Problems Paternal Grandmother    • No Known Problems Paternal Grandfather    • No Known Problems Brother    • No Known Problems Sister    • Brain cancer Brother        SOCIAL HISTORY  Social History     Social History   • Marital status:      Spouse name: N/A   • Number of children: N/A   • Years of education: N/A     Occupational History   • Not on file.     Social History Main Topics   • Smoking status: Former Smoker   • Smokeless tobacco: Not on file   • Alcohol use No   • Drug use: No   • Sexual activity: Defer     Other Topics Concern   • Not on file     Social History Narrative       ALLERGIES  Review of patient's allergies indicates no known allergies.    REVIEW OF SYSTEMS  Review of Systems   Constitutional: Positive for activity change (inability to ambulate), chills, diaphoresis and fever. Negative for appetite change.   HENT: Negative for congestion and sore throat.    Eyes: Negative.    Respiratory: Negative for cough and shortness of breath.    Cardiovascular: Positive for chest pain (L sternal border tenderness). Negative for leg swelling.   Gastrointestinal:  Negative for abdominal pain, diarrhea and vomiting.   Endocrine: Negative.    Genitourinary: Negative for decreased urine volume and dysuria.   Musculoskeletal: Negative for neck pain.   Skin: Negative for rash and wound.   Allergic/Immunologic: Negative.    Neurological: Positive for tremors, syncope, weakness, light-headedness and headaches (mild). Negative for numbness.   Hematological: Negative.    Psychiatric/Behavioral: Negative.    All other systems reviewed and are negative.      PHYSICAL EXAM  ED Triage Vitals   Temp Heart Rate Resp BP SpO2   06/02/17 1236 06/02/17 1236 06/02/17 1236 06/02/17 1240 06/02/17 1236   98.3 °F (36.8 °C) 105 18 105/77 96 %      Temp src Heart Rate Source Patient Position BP Location FiO2 (%)   -- -- -- -- --              Physical Exam   Constitutional: He is oriented to person, place, and time and well-developed, well-nourished, and in no distress.   HENT:   Head: Normocephalic and atraumatic.   Mouth/Throat: Abnormal dentition. Dental caries present. No dental abscesses.   Tenderness to L alveolus   Eyes: EOM are normal. Pupils are equal, round, and reactive to light.   Neck: Normal range of motion. Neck supple.   Cardiovascular: Normal rate, regular rhythm and normal heart sounds.    Pulmonary/Chest: Effort normal and breath sounds normal. No respiratory distress.   Abdominal: Soft. There is no tenderness. There is no rebound and no guarding.   Musculoskeletal: Normal range of motion. He exhibits no edema.   Neurological: He is alert and oriented to person, place, and time. He has normal sensation and normal strength.   Skin: Skin is warm and dry.   Abrasions to both elbows   Psychiatric: Mood and affect normal.   Nursing note and vitals reviewed.      LAB RESULTS  Lab Results (last 24 hours)     Procedure Component Value Units Date/Time    CBC & Differential [[id number]] Collected:  06/02/17 1238    Specimen:  Blood Updated:  06/02/17 1251    Narrative:       The following  orders were created for panel order CBC & Differential.  Procedure                               Abnormality         Status                     ---------                               -----------         ------                     CBC Auto Differential[[id number]]        Abnormal            Final result                 Please view results for these tests on the individual orders.    Comprehensive Metabolic Panel [[id number]]  (Abnormal) Collected:  06/02/17 1238    Specimen:  Blood Updated:  06/02/17 1407     Glucose 141 (H) mg/dL      BUN 21 mg/dL      Creatinine 1.11 mg/dL      Sodium 141 mmol/L      Potassium 3.9 mmol/L      Chloride 101 mmol/L      CO2 23.8 mmol/L      Calcium 9.2 mg/dL      Total Protein 6.6 g/dL      Albumin 3.80 g/dL      ALT (SGPT) 37 U/L      AST (SGOT) 62 (H) U/L      Alkaline Phosphatase 102 U/L      Total Bilirubin 0.9 mg/dL      eGFR Non African Amer 66 mL/min/1.73      Globulin 2.8 gm/dL      A/G Ratio 1.4 g/dL      BUN/Creatinine Ratio 18.9     Anion Gap 16.2 mmol/L     Troponin [[id number]]  (Normal) Collected:  06/02/17 1238    Specimen:  Blood Updated:  06/02/17 1307     Troponin T <0.010 ng/mL     Narrative:       Troponin T Reference Ranges:  Less than 0.03 ng/mL:    Negative for AMI  0.03 to 0.09 ng/mL:      Indeterminant for AMI  Greater than 0.09 ng/mL: Positive for AMI    CBC Auto Differential [[id number]]  (Abnormal) Collected:  06/02/17 1238    Specimen:  Blood Updated:  06/02/17 1251     WBC 5.07 10*3/mm3      RBC 4.84 10*6/mm3      Hemoglobin 16.7 g/dL      Hematocrit 47.5 %      MCV 98.1 (H) fL      MCH 34.5 (H) pg      MCHC 35.2 g/dL      RDW 12.8 %      RDW-SD 46.6 fl      MPV 10.6 fL      Platelets 109 (L) 10*3/mm3      Neutrophil % 79.3 (H) %      Lymphocyte % 14.2 (L) %      Monocyte % 5.7 %      Eosinophil % 0.2 (L) %      Basophil % 0.6 %      Immature Grans % 0.0 %      Neutrophils, Absolute 4.02 10*3/mm3      Lymphocytes, Absolute 0.72 (L) 10*3/mm3      Monocytes,  "Absolute 0.29 10*3/mm3      Eosinophils, Absolute 0.01 10*3/mm3      Basophils, Absolute 0.03 10*3/mm3      Immature Grans, Absolute 0.00 10*3/mm3     C-reactive Protein [[id number]]  (Abnormal) Collected:  06/02/17 1637    Specimen:  Blood Updated:  06/02/17 1711     C-Reactive Protein 1.60 (H) mg/dL     Sedimentation Rate [[id number]]  (Normal) Collected:  06/02/17 1637    Specimen:  Blood Updated:  06/02/17 1736     Sed Rate 2 mm/hr     Procalcitonin [[id number]]  (Abnormal) Collected:  06/02/17 1637    Specimen:  Blood Updated:  06/02/17 1717     Procalcitonin 0.36 (H) ng/mL     Narrative:       As a Marker for Sepsis (Non-Neonates):   1. <0.5 ng/mL represents a low risk of severe sepsis and/or septic shock.  1. >2 ng/mL represents a high risk of severe sepsis and/or septic shock.    As a Marker for Lower Respiratory Tract Infections that require antibiotic therapy:  PCT on Admission     Antibiotic Therapy             6-12 Hrs later  > 0.5                Strongly Recommended            >0.25 - <0.5         Recommended  0.1 - 0.25           Discouraged                   Remeasure/reassess PCT  <0.1                 Strongly Discouraged          Remeasure/reassess PCT      As 28 day mortality risk marker: \"Change in Procalcitonin Result\" (> 80 % or <=80 %) if Day 0 (or Day 1) and Day 4 values are available. Refer to [email or web address]://[email or web address]-pct-[email or web address]/   Change in PCT <=80 %   A decrease of PCT levels below or equal to 80 % defines a positive change in PCT test result representing a higher risk for 28-day all-cause mortality of patients diagnosed with severe sepsis or septic shock.  Change in PCT > 80 %   A decrease of PCT levels of more than 80 % defines a negative change in PCT result representing a lower risk for 28-day all-cause mortality of patients diagnosed with severe sepsis or septic shock.                Lactic Acid, Plasma [[id number]]  (Normal) Collected:  06/02/17 1637    Specimen:  Blood Updated:  " 06/02/17 1658     Lactate 1.3 mmol/L     Urinalysis With / Culture If Indicated [[id number]]  (Abnormal) Collected:  06/02/17 1713    Specimen:  Urine from Urine, Clean Catch Updated:  06/02/17 1812     Color, UA Red (A)      Any Substance that causes an abnormal urine color can alter the accuracy of the chemical reactions.        Appearance, UA Cloudy (A)     pH, UA <=5.0     Specific Gravity, UA >=1.030     Glucose, UA Negative     Ketones, UA 15 mg/dL (1+) (A)     Bilirubin, UA Negative     Blood, UA Negative     Protein,  mg/dL (2+) (A)     Leuk Esterase, UA Small (1+) (A)     Nitrite, UA Positive (A)     Urobilinogen, UA 1.0 E.U./dL    Urinalysis, Microscopic Only [[id number]]  (Abnormal) Collected:  06/02/17 1713    Specimen:  Urine from Urine, Clean Catch Updated:  06/02/17 1829     RBC, UA None Seen /HPF      WBC, UA 0-2 /HPF      Bacteria, UA Trace (A) /HPF      Squamous Epithelial Cells, UA 0-2 /HPF      Hyaline Casts, UA None Seen /LPF      Calcium Oxalate Crystals, UA Small/1+ /HPF      Amorphous Crystals, UA Large/3+ /HPF      Methodology Manual Light Microscopy    Urine Culture [[id number]] Collected:  06/02/17 1713    Specimen:  Urine from Urine, Clean Catch Updated:  06/02/17 1809    Blood Culture [[id number]] Collected:  06/02/17 1836    Specimen:  Blood from Arm, Right Updated:  06/02/17 1841          I ordered the above labs and reviewed the results    RADIOLOGY  XR Chest 2 View   Final Result   IMPRESSIONS: COPD. There is no evidence of acute disease within the  chest.    This report was finalized on 6/2/2017 12:59 PM by Dr. Mariano Cuevas MD.         I ordered the above noted radiological studies. Interpreted by radiologist. Reviewed by me in PACS.       PROCEDURES  Procedures  EKG           EKG time: 1233  Rhythm/Rate: NSR 97  P waves and W[place]: nml  QRS, axis: nml   ST and T waves: nml     Interpreted Contemporaneously by me, independently viewed  unchanged compared to prior  05/2017      PROGRESS AND CONSULTS  ED Course   1231: Ordered ECG, blood work, troponin, lactic acid, procalcitonin, sed-rate, c-reactive protein, UA, CXR and CT head for further evaluation.    1820:Discussed patient's case with Dr. Montilla, cardiology, including concerns regarding pt's lab and radiology results. He prefers to consult.    1618: Ordered CT head and XR panorex for further evaluation. Ordered rocephin for infection, IVF for hydration.     1630: Rechecked pt who is in NAD. Discussed UTI finding and other radiology results. Also discussed admission plan for further monitoring. Pt and family understand and agree with the plan. All questions answered.    MEDICAL DECISION MAKING  Results were reviewed/discussed with the patient and they were also made aware of online access. Pt also made aware that some labs, such as cultures, will not be resulted during ER visit and follow up with PMD is necessary.     MDM  Number of Diagnoses or Management Options  Periapical abscess:   Syncope and collapse:      Amount and/or Complexity of Data Reviewed  Clinical lab tests: ordered and reviewed (Urine is suspicious for UTI)  Tests in the radiology section of CPT®: ordered and reviewed (CXR IMPRESSIONS: COPD. There is no evidence of acute disease within the chest.)  Tests in the medicine section of CPT®: ordered and reviewed (EKG - See EKG procedure note)  Decide to obtain previous medical records or to obtain history from someone other than the patient: yes  Review and summarize past medical records: yes (Admitted 2 weeks ago w/ syncope  Staff stopped cenopril b/c pt seemed dry and orthostatic)  Discuss the patient with other providers: yes (Dr. Montilla, cardiology)  Independent visualization of images, tracings, or specimens: yes    Patient Progress  Patient progress: stable         DIAGNOSIS  Final diagnoses:   Syncope and collapse   Periapical abscess   Fever chills       DISPOSITION  ADMISSION    Discussed  treatment plan and reason for admission with pt/family and admitting physician.  Pt/family voiced understanding of the plan for admission for further testing/treatment as needed.         Latest Documented Vital Signs:  As of 6:53 PM  BP- 112/73 HR- 80 Temp- 98.4 °F (36.9 °C) (Tympanic) O2 sat- 97%    --  Documentation assistance provided by leila Chand for Dr. Lancaster.  Information recorded by the scribe was done at my direction and has been verified and validated by me.     Chio Chand  06/02/17 1857       Travon Lancaster MD  06/03/17 0018

## 2017-06-03 ENCOUNTER — APPOINTMENT (OUTPATIENT)
Dept: CARDIOLOGY | Facility: HOSPITAL | Age: 67
End: 2017-06-03
Attending: INTERNAL MEDICINE

## 2017-06-03 ENCOUNTER — APPOINTMENT (OUTPATIENT)
Dept: CT IMAGING | Facility: HOSPITAL | Age: 67
End: 2017-06-03

## 2017-06-03 LAB
ALBUMIN SERPL-MCNC: 3.1 G/DL (ref 3.5–5.2)
ALBUMIN/GLOB SERPL: 1.3 G/DL
ALP SERPL-CCNC: 85 U/L (ref 39–117)
ALT SERPL W P-5'-P-CCNC: 35 U/L (ref 1–41)
ANION GAP SERPL CALCULATED.3IONS-SCNC: 10.6 MMOL/L
AST SERPL-CCNC: 56 U/L (ref 1–40)
BASOPHILS # BLD AUTO: 0.03 10*3/MM3 (ref 0–0.2)
BASOPHILS NFR BLD AUTO: 0.9 % (ref 0–1.5)
BH CV XLRA MEAS LEFT CCA RATIO VEL: 75.4 CM/SEC
BH CV XLRA MEAS LEFT DIST CCA EDV: 23.6 CM/SEC
BH CV XLRA MEAS LEFT DIST CCA PSV: 75.4 CM/SEC
BH CV XLRA MEAS LEFT DIST ICA EDV: -24.6 CM/SEC
BH CV XLRA MEAS LEFT DIST ICA PSV: -76.8 CM/SEC
BH CV XLRA MEAS LEFT ICA RATIO VEL: -111 CM/SEC
BH CV XLRA MEAS LEFT ICA/CCA RATIO: -1.5
BH CV XLRA MEAS LEFT MID ICA EDV: -33 CM/SEC
BH CV XLRA MEAS LEFT MID ICA PSV: -95.1 CM/SEC
BH CV XLRA MEAS LEFT PROX CCA EDV: 33.4 CM/SEC
BH CV XLRA MEAS LEFT PROX CCA PSV: 155 CM/SEC
BH CV XLRA MEAS LEFT PROX ECA EDV: -14.9 CM/SEC
BH CV XLRA MEAS LEFT PROX ECA PSV: -88 CM/SEC
BH CV XLRA MEAS LEFT PROX ICA EDV: -30.6 CM/SEC
BH CV XLRA MEAS LEFT PROX ICA PSV: -111 CM/SEC
BH CV XLRA MEAS LEFT PROX SCLA PSV: 148 CM/SEC
BH CV XLRA MEAS LEFT VERTEBRAL A EDV: 37.7 CM/SEC
BH CV XLRA MEAS LEFT VERTEBRAL A PSV: 110 CM/SEC
BH CV XLRA MEAS RIGHT CCA RATIO VEL: 85.6 CM/SEC
BH CV XLRA MEAS RIGHT DIST CCA EDV: 20.4 CM/SEC
BH CV XLRA MEAS RIGHT DIST CCA PSV: 85.6 CM/SEC
BH CV XLRA MEAS RIGHT DIST ICA EDV: -28.3 CM/SEC
BH CV XLRA MEAS RIGHT DIST ICA PSV: -73.1 CM/SEC
BH CV XLRA MEAS RIGHT ICA RATIO VEL: -112 CM/SEC
BH CV XLRA MEAS RIGHT ICA/CCA RATIO: -1.3
BH CV XLRA MEAS RIGHT MID ICA EDV: -30.6 CM/SEC
BH CV XLRA MEAS RIGHT MID ICA PSV: -105 CM/SEC
BH CV XLRA MEAS RIGHT PROX CCA EDV: 25.9 CM/SEC
BH CV XLRA MEAS RIGHT PROX CCA PSV: 108 CM/SEC
BH CV XLRA MEAS RIGHT PROX ECA EDV: -11.8 CM/SEC
BH CV XLRA MEAS RIGHT PROX ECA PSV: -98.2 CM/SEC
BH CV XLRA MEAS RIGHT PROX ICA EDV: -40.1 CM/SEC
BH CV XLRA MEAS RIGHT PROX ICA PSV: -112 CM/SEC
BH CV XLRA MEAS RIGHT PROX SCLA PSV: 109 CM/SEC
BH CV XLRA MEAS RIGHT VERTEBRAL A EDV: 15.7 CM/SEC
BH CV XLRA MEAS RIGHT VERTEBRAL A PSV: 55 CM/SEC
BILIRUB SERPL-MCNC: 0.8 MG/DL (ref 0.1–1.2)
BUN BLD-MCNC: 25 MG/DL (ref 8–23)
BUN/CREAT SERPL: 26.6 (ref 7–25)
CALCIUM SPEC-SCNC: 8.3 MG/DL (ref 8.6–10.5)
CHLORIDE SERPL-SCNC: 103 MMOL/L (ref 98–107)
CO2 SERPL-SCNC: 24.4 MMOL/L (ref 22–29)
CREAT BLD-MCNC: 0.94 MG/DL (ref 0.76–1.27)
CRP SERPL-MCNC: 2.08 MG/DL (ref 0–0.5)
DEPRECATED RDW RBC AUTO: 48 FL (ref 37–54)
EOSINOPHIL # BLD AUTO: 0 10*3/MM3 (ref 0–0.7)
EOSINOPHIL NFR BLD AUTO: 0 % (ref 0.3–6.2)
ERYTHROCYTE [DISTWIDTH] IN BLOOD BY AUTOMATED COUNT: 13.2 % (ref 11.5–14.5)
GFR SERPL CREATININE-BSD FRML MDRD: 80 ML/MIN/1.73
GLOBULIN UR ELPH-MCNC: 2.4 GM/DL
GLUCOSE BLD-MCNC: 99 MG/DL (ref 65–99)
HCT VFR BLD AUTO: 41.3 % (ref 40.4–52.2)
HGB BLD-MCNC: 14.1 G/DL (ref 13.7–17.6)
IMM GRANULOCYTES # BLD: 0 10*3/MM3 (ref 0–0.03)
IMM GRANULOCYTES NFR BLD: 0 % (ref 0–0.5)
INR PPP: 1.15 (ref 0.9–1.1)
LEFT ARM BP: NORMAL MMHG
LYMPHOCYTES # BLD AUTO: 0.8 10*3/MM3 (ref 0.9–4.8)
LYMPHOCYTES NFR BLD AUTO: 23.2 % (ref 19.6–45.3)
MAGNESIUM SERPL-MCNC: 1.7 MG/DL (ref 1.6–2.4)
MCH RBC QN AUTO: 34.1 PG (ref 27–32.7)
MCHC RBC AUTO-ENTMCNC: 34.1 G/DL (ref 32.6–36.4)
MCV RBC AUTO: 99.8 FL (ref 79.8–96.2)
MONOCYTES # BLD AUTO: 0.39 10*3/MM3 (ref 0.2–1.2)
MONOCYTES NFR BLD AUTO: 11.3 % (ref 5–12)
NEUTROPHILS # BLD AUTO: 2.23 10*3/MM3 (ref 1.9–8.1)
NEUTROPHILS NFR BLD AUTO: 64.6 % (ref 42.7–76)
PLATELET # BLD AUTO: 85 10*3/MM3 (ref 140–500)
PMV BLD AUTO: 11.1 FL (ref 6–12)
POTASSIUM BLD-SCNC: 4.3 MMOL/L (ref 3.5–5.2)
PROCALCITONIN SERPL-MCNC: 0.4 NG/ML (ref 0.1–0.25)
PROT SERPL-MCNC: 5.5 G/DL (ref 6–8.5)
PROTHROMBIN TIME: 14.3 SECONDS (ref 11.7–14.2)
RBC # BLD AUTO: 4.14 10*6/MM3 (ref 4.6–6)
RIGHT ARM BP: NORMAL MMHG
SODIUM BLD-SCNC: 138 MMOL/L (ref 136–145)
T3FREE SERPL-MCNC: 2.51 PG/ML (ref 2–4.4)
T4 FREE SERPL-MCNC: 1.88 NG/DL (ref 0.93–1.7)
TROPONIN T SERPL-MCNC: <0.01 NG/ML (ref 0–0.03)
TSH SERPL DL<=0.05 MIU/L-ACNC: 0.03 MIU/ML (ref 0.27–4.2)
WBC NRBC COR # BLD: 3.45 10*3/MM3 (ref 4.5–10.7)

## 2017-06-03 PROCEDURE — 84481 FREE ASSAY (FT-3): CPT | Performed by: INTERNAL MEDICINE

## 2017-06-03 PROCEDURE — 93010 ELECTROCARDIOGRAM REPORT: CPT | Performed by: INTERNAL MEDICINE

## 2017-06-03 PROCEDURE — 80053 COMPREHEN METABOLIC PANEL: CPT | Performed by: INTERNAL MEDICINE

## 2017-06-03 PROCEDURE — 25010000002 ENOXAPARIN PER 10 MG: Performed by: INTERNAL MEDICINE

## 2017-06-03 PROCEDURE — 99223 1ST HOSP IP/OBS HIGH 75: CPT | Performed by: INTERNAL MEDICINE

## 2017-06-03 PROCEDURE — 25010000003 CEFTRIAXONE PER 250 MG: Performed by: INTERNAL MEDICINE

## 2017-06-03 PROCEDURE — 86140 C-REACTIVE PROTEIN: CPT | Performed by: INTERNAL MEDICINE

## 2017-06-03 PROCEDURE — 84484 ASSAY OF TROPONIN QUANT: CPT | Performed by: INTERNAL MEDICINE

## 2017-06-03 PROCEDURE — 83735 ASSAY OF MAGNESIUM: CPT | Performed by: INTERNAL MEDICINE

## 2017-06-03 PROCEDURE — 84145 PROCALCITONIN (PCT): CPT | Performed by: INTERNAL MEDICINE

## 2017-06-03 PROCEDURE — 85025 COMPLETE CBC W/AUTO DIFF WBC: CPT | Performed by: INTERNAL MEDICINE

## 2017-06-03 PROCEDURE — 85610 PROTHROMBIN TIME: CPT | Performed by: INTERNAL MEDICINE

## 2017-06-03 PROCEDURE — 70487 CT MAXILLOFACIAL W/DYE: CPT

## 2017-06-03 PROCEDURE — 93005 ELECTROCARDIOGRAM TRACING: CPT | Performed by: INTERNAL MEDICINE

## 2017-06-03 PROCEDURE — 93880 EXTRACRANIAL BILAT STUDY: CPT

## 2017-06-03 PROCEDURE — 93306 TTE W/DOPPLER COMPLETE: CPT

## 2017-06-03 PROCEDURE — 84439 ASSAY OF FREE THYROXINE: CPT | Performed by: INTERNAL MEDICINE

## 2017-06-03 PROCEDURE — 0 IOPAMIDOL 61 % SOLUTION: Performed by: INTERNAL MEDICINE

## 2017-06-03 PROCEDURE — 84443 ASSAY THYROID STIM HORMONE: CPT | Performed by: INTERNAL MEDICINE

## 2017-06-03 PROCEDURE — 93306 TTE W/DOPPLER COMPLETE: CPT | Performed by: INTERNAL MEDICINE

## 2017-06-03 RX ADMIN — CEFTRIAXONE SODIUM 2 G: 2 INJECTION, SOLUTION INTRAVENOUS at 18:13

## 2017-06-03 RX ADMIN — ENOXAPARIN SODIUM 40 MG: 40 INJECTION SUBCUTANEOUS at 21:21

## 2017-06-03 RX ADMIN — SODIUM CHLORIDE 125 ML/HR: 9 INJECTION, SOLUTION INTRAVENOUS at 07:11

## 2017-06-03 RX ADMIN — HYDROCODONE BITARTRATE AND ACETAMINOPHEN 1 TABLET: 5; 325 TABLET ORAL at 05:52

## 2017-06-03 RX ADMIN — LEVOTHYROXINE SODIUM 100 MCG: 100 TABLET ORAL at 05:49

## 2017-06-03 RX ADMIN — HYDROCODONE BITARTRATE AND ACETAMINOPHEN 1 TABLET: 5; 325 TABLET ORAL at 18:13

## 2017-06-03 RX ADMIN — SODIUM CHLORIDE 125 ML/HR: 9 INJECTION, SOLUTION INTRAVENOUS at 18:13

## 2017-06-03 RX ADMIN — IOPAMIDOL 75 ML: 612 INJECTION, SOLUTION INTRAVENOUS at 07:33

## 2017-06-03 RX ADMIN — ATORVASTATIN CALCIUM 80 MG: 40 TABLET, FILM COATED ORAL at 08:48

## 2017-06-03 RX ADMIN — HYDROCODONE BITARTRATE AND ACETAMINOPHEN 1 TABLET: 5; 325 TABLET ORAL at 12:01

## 2017-06-03 RX ADMIN — ASPIRIN 81 MG: 81 TABLET ORAL at 08:48

## 2017-06-03 NOTE — CONSULTS
Referring Provider: Sae Celestin MD  Reason for Consultation: Possible osteo    Subjective   History of present illness:   This is a very nice 66-year-old gentleman who was admitted on 06/02/2017 with syncope. The Infectious Disease Service was asked to provide evaluation and opinion regarding potential osteomyelitis of the mandible.     Per the patient he says he was in his usual state of health until 05/2017 when he developed a syncopal episode. He was admitted and had a workup but was felt likely related to dehydration. He was discharged and was doing well up until the evening of 06/01/2017 when he had drenching night sweats. He awoke the next day and was feeling largely pretty well at that time. He went about his day and went into the kitchen yesterday afternoon to pour himself a cup of Sprite. Unfortunately, he had a syncopal episode that lasted about 2 minutes. Resolved spontaneously. He has no other syncopal episodes since that time. He was brought to the [facility] where he was admitted. A panorex showed possible periapical abscess for which we were asked to evaluate. The patient says that he has been dealing with poor dentition for a number of years. He normally pulls his own teeth. He does have a right mandibular tooth that has been carious and more bothersome of late. He denies any associated fevers or chills but did have that episode of night sweats as above. He has been started on vancomycin and ceftriaxone and Oral Surgery as well as Cardiology has been consulted.     PAST MEDICAL HISTORY:   1. Coronary artery disease.   2. COPD.   3. Diabetes mellitus type 2.   4. Hyperlipidemia.   5. Past myocardial infarction with stent placement.   6. Hypothyroidism.     FAMILY HISTORY:  No family history of infection.     SOCIAL HISTORY:  He is  and lives in [place], [place]. He is retired. He is a former smoker but does not smoke.    No Known Allergies    Review of  Systems  Pertinent items are noted in HPI, all other systems reviewed and negative except for chronic osteoarthritic knee pain bilaterally    Objective     Physical Exam:   Vital Signs   Temp:  [97.8 °F (36.6 °C)-98.4 °F (36.9 °C)] 97.8 °F (36.6 °C)  Heart Rate:  [] 85  Resp:  [16-18] 16  BP: (105-136)/() 108/70    GENERAL: Awake and alert, in no acute distress.   HEENT: Oropharynx reveals very poor dentition with multiple rotting, carious teeth.  Hearing is grossly normal.   EYES: PERRL. No conjunctival injection. No lid lag.   LYMPHATICS: No lymphadenopathy of the neck or inguinal regions.   HEART: Regular rate and rhythm. No peripheral edema.   LUNGS: Clear to auscultation anteriorly with normal respiratory effort.   GI: Soft, nontender, nondistended. No appreciable organomegaly.   SKIN: Warm and dry without cutaneous eruptions   PSYCHIATRIC: Appropriate mood, affect, insight, and judgment.     Results Review:   I reviewed the patient's new clinical results.  Cr 0.94  AST 56  WBC 3.45 (p65, L 23, M 11)  CRP 2.1  TSH 0.029  PCT 0.36    Microbiology:  BCx and UCX ngtd    Radiology:  Panorex: periapical abscess suggested in R mandibular lateral incisor   CXR, independently interpreted: hyperinflated, no pneumonia    Assessment/Plan   1.  Periapical abscess, rule out osteomyelitis  2.  Syncope  3.  Hypothyroidism  4.  COPD  5.  Ischemic cardiomyopathy    Patient admitted with syncopal episode.  I doubt infectious etiology although I acknowledge that his inflammatory markers and Procalcitonin are minimally elevated.  He did have an episode of night sweats the day prior to admission I guess he could have had bacteremia from oral marie.  Echocardiogram has been ordered to assess for valvular pathology as well as endocarditis.  Agree with blood cultures.  We will follow-up CT of his facial bones and oral maxillofacial surgery consultation.  I doubt that he has osteomyelitis.  MRSA is not typical oropharyngeal  marie, so we will discontinue vancomycin.  I will keep him on ceftriaxone until blood cultures are negative at 48 hours and imaging and other consultations are back.  Repeat inflammatory markers and Procalcitonin in the morning.  Anticipate change to oral antibiotics soon.    Thank you for this consult.  We will continue to follow along and tailor antibiotics as the patient's clinical course evolves.    Avinash Schmitz MD  06/03/17  8:50 AM

## 2017-06-03 NOTE — H&P
Name: Db Hardin ADMIT: 2017   : 1950  PCP: MICHAEL Collado    MRN: [id number] LOS: 0 days   AGE/SEX: 66 y.o. male  ROOM: [facility]/     Chief Complaint   Patient presents with   • Chest Pain       Subjective   Mr Hardin is a pleasant 66yoM who presents after syncopal episode earlier on day of admission. He reports fevers and chills night before with sweats. Prior to event he had very brief sensation of dizziness. He did not have vision changes or CP/palpitations. He had LOC for perhaps 1-2 minutes. Awoke without confusion. No incontinence, tongue biting, or convulsions. He states he hit left chest on fridge and currently has dull L sided CP that is non radiating, not associated with SOA. He has poor dentition and was told that he needed to have teeth removed before possible TKA for knee pain but has not seen dentist because of cost.      History of Present Illness    Past Medical History:   Diagnosis Date   • CAD (coronary artery disease)    • COPD (chronic obstructive pulmonary disease)    • Diabetes    • Hyperlipidemia    • Myocardial infarction 2011    tx with PCI   • Sinus bradycardia    • ST elevation myocardial infarction involving left anterior descending (LAD) coronary artery 2016 tx with PCI     Past Surgical History:   Procedure Laterality Date   • CARDIAC CATHETERIZATION      :3.0x15mm Vision pLAD; 3.5x20mm Vision p circ; 3.5x28mm Vision to mRCA   • CORONARY ANGIOPLASTY     • CORONARY STENT PLACEMENT      :3.0x15mm Vision pLAD; 3.5x20mm Vision p circ; 3.5x28mm Vision to mRCA     Family History   Problem Relation Age of Onset   • No Known Problems Mother    • Cancer Father    • No Known Problems Sister    • No Known Problems Brother    • No Known Problems Maternal Grandmother    • No Known Problems Maternal Grandfather    • No Known Problems Paternal Grandmother    • No Known Problems Paternal Grandfather    • No Known Problems Brother    • No Known  Problems Sister    • Brain cancer Brother      Social History   Substance Use Topics   • Smoking status: Former Smoker   • Smokeless tobacco: None   • Alcohol use No     Prescriptions Prior to Admission   Medication Sig Dispense Refill Last Dose   • albuterol (PROVENTIL HFA;VENTOLIN HFA) 108 (90 BASE) MCG/ACT inhaler Inhale 2 puffs Every 4 (Four) Hours As Needed for wheezing. 6.7 g 11 Taking   • aspirin 81 MG tablet Take 81 mg by mouth Daily.   Taking   • levothyroxine (SYNTHROID, LEVOTHROID) 100 MCG tablet Take 100 mcg by mouth daily.   Taking   • pravastatin (PRAVACHOL) 40 MG tablet Take 40 mg by mouth Daily.   Taking     Allergies:  Review of patient's allergies indicates no known allergies.    Review of Systems   Constitutional: Positive for chills, diaphoresis and fever.   HENT: Negative for sore throat and trouble swallowing.    Eyes: Negative for redness and visual disturbance.   Respiratory: Negative for cough and shortness of breath.    Cardiovascular: Negative for chest pain and palpitations.   Gastrointestinal: Negative for constipation, diarrhea, nausea and vomiting.   Endocrine: Negative for cold intolerance and heat intolerance.   Genitourinary: Negative for difficulty urinating and dysuria.   Musculoskeletal: Negative for neck pain and neck stiffness.   Skin: Negative for pallor and rash.   Allergic/Immunologic: Negative for environmental allergies and food allergies.   Neurological: Positive for syncope. Negative for seizures.   Hematological: Negative for adenopathy.   Psychiatric/Behavioral: Negative for agitation and confusion.        Objective    Vital Signs  Temp:  [98.3 °F (36.8 °C)-98.4 °F (36.9 °C)] 98.4 °F (36.9 °C)  Heart Rate:  [] 80  Resp:  [16-18] 16  BP: (105-136)/() 120/73  SpO2:  [92 %-100 %] 99 %  on   ;   O2 Device: room air  Body mass index is 23.89 kg/(m^2).    Physical Exam   Constitutional: He appears well-developed and well-nourished.   HENT:   Head: Normocephalic  and atraumatic.   Nose: Nose normal.   Poor dentition   Eyes: EOM are normal. Pupils are equal, round, and reactive to light.   Neck: Normal range of motion. Neck supple.   Cardiovascular: Normal rate, regular rhythm, normal heart sounds and intact distal pulses.    No murmur heard.  Pulmonary/Chest: Effort normal and breath sounds normal. He has no wheezes.   Abdominal: Soft. Bowel sounds are normal. He exhibits no distension. There is no tenderness.   Musculoskeletal: Normal range of motion. He exhibits no edema.   Neurological: He is alert. No cranial nerve deficit.   Skin: Skin is warm and dry.   Psychiatric: He has a normal mood and affect. His behavior is normal.   Nursing note and vitals reviewed.      Results Review:   I reviewed the patient's new clinical results. Reviewed imaging and agree with interpretation. Reviewed EKG and agree with interpretation. Reviewed prior records.    Results from last 7 days  Lab Units 06/02/17  1238   WBC 10*3/mm3 5.07   HEMOGLOBIN g/dL 16.7   PLATELETS 10*3/mm3 109*     Results from last 7 days  Lab Units 06/02/17  1238   SODIUM mmol/L 141   POTASSIUM mmol/L 3.9   CHLORIDE mmol/L 101   TOTAL CO2 mmol/L 23.8   BUN mg/dL 21   CREATININE mg/dL 1.11   GLUCOSE mg/dL 141*   ALBUMIN g/dL 3.80   BILIRUBIN mg/dL 0.9   ALK PHOS U/L 102   AST (SGOT) U/L 62*   ALT (SGPT) U/L 37   Estimated Creatinine Clearance: 57.5 mL/min (by C-G formula based on Cr of 1.11).  Results from last 7 days  Lab Units 06/02/17  1238   TROPONIN T ng/mL <0.010       Results from last 7 days  Lab Units 06/02/17  1713   NITRITE UA  Positive*   WBC UA /HPF 0-2   BACTERIA UA /HPF Trace*   SQUAM EPITHEL UA /HPF 0-2     XR Panorex         CT Head Without Contrast   Preliminary Result   No evidence of fracture or hemorrhage.          XR Chest 2 View   Final Result      CT Facial Bones With Contrast    (Results Pending)     Assessment/Plan     Principal Problem:    Syncope and collapse  Active Problems:    Chest  pain    Periapical abscess    Fever      Assessment & Plan    -Syncope: 2nd occurrence in few weeks. With his reported CP will repeat troponin and EKG. Check Echo. ASA, Statin. Consult Cardiology.  -Periapical Abscess: Possible Osteomyelitis on XR elevated procal and CRP. Discussed with radiology who suggested CT face with contrast to further evaluate. Vancomycin and Rocephin for coverage. ID and OMFS consults.  -Lovenox Ppx  -Full Code    I discussed the patients findings and my recommendations with patient, family and consulting provider.    Sae Celestin MD  [facility]  06/02/17  10:35 PM

## 2017-06-03 NOTE — PROGRESS NOTES
"Pharmacokinetic Consult - Vancomycin Dosing (Initial Note)  Day #1   Db Hardin has been consulted for pharmacy to dose vancomycin for bone and joint infection   Pharmacy dosing vancomycin per Dr Celestin 's request.   Goal trough: 15-20  mg/L   Other antimicrobials: Rocephin     Relevant clinical data and objective history reviewed:  66 y.o. male 66\" (167.6 cm) 148 lb (67.1 kg)    Past Medical History:   Diagnosis Date   • CAD (coronary artery disease)    • COPD (chronic obstructive pulmonary disease)    • Diabetes    • Hyperlipidemia    • Myocardial infarction 03/2011    tx with PCI   • Sinus bradycardia    • ST elevation myocardial infarction involving left anterior descending (LAD) coronary artery 5/6/2016 2011 tx with PCI     Creatinine   Date Value Ref Range Status   06/02/2017 1.11 0.76 - 1.27 mg/dL Final     BUN   Date Value Ref Range Status   06/02/2017 21 8 - 23 mg/dL Final     Estimated Creatinine Clearance: 57.5 mL/min (by C-G formula based on Cr of 1.11).    Lab Results   Component Value Date    WBC 5.07 06/02/2017     Temp Readings from Last 3 Encounters:   06/02/17 98.4 °F (36.9 °C) (Tympanic)   05/17/17 97.3 °F (36.3 °C) (Oral)   02/13/17 97.3 °F (36.3 °C) (Oral)      Baseline culture/source/susceptibility: 6/2  BC x2  Pending                                                                6/2  UC  Pending    Assessment/Plan   1.  Give Vancomycin 1250mg IV  load ( 18.6mg/kg) then continue with Vancomycin 1000mg IV ( 14.9mg/kg) q 12 hours.  Obtain trough prior to 4th overall dose  1000 6/4/17.   2. Monitor renal function…serum creatinine in am   3. Encourage hydration to prevent toxic accumulation; monitor for s/sx of toxicity including increase in SCr and decrease in UOP.                                                                 4. Pharmacy will continue to follow and adjust accordingly.    Jenny Gil, [facility]    "

## 2017-06-03 NOTE — CONSULTS
Date of Consultation: 17    Referral Provider: Sae Celestin MD     Reason for Consultation: Recurrent syncope, CAD    Encounter Provider: Timbo Maguire MD    Group of Service: [facility]     Patient Name: Db Hardin    :1950    Chief complaint: Syncope, fever    History of Present Illness:      This is a very pleasant 66 year-old white male who is normally followed by Dr. Resendiz in our group.  He has a history of coronary artery disease, diabetes, hypertension, and COPD.  He presented in  with an acute anterior MI, and underwent bare metal stenting to the proximal LAD, as well as 2 significant lesions in the left circumflex at that time.  He then had a staged intervention with bare-metal stent in place to the mid right coronary artery 3 days later.  He recently was admitted on 2017 after a syncopal episode, at which time he was sitting on his tractor, got down to get a drink, and suddenly woke up on the ground surrounded by bystanders.  He did not have any prodrome or warning.  His lisinopril was discontinued during that admission, and a nuclear stress test was normal.    The patient presents today with a recurrent episode of syncope.  He states that he was in his kitchen, and went to get something out of the refrigerator.  He states that he then woke up on the ground, and was very weak.  He has no recollection of feeling lightheaded, and states that he did not have any warning.  He called to his wife in the bedroom, and subsequently was taken to the emergency department.  He also had stated that he had felt fever and chills, as well as night sweats overnight and this morning.  His CRP and pro-calcitonin levels were both elevated in the emergency department.  An x-ray revealed a possible right mandibular periapical abscess, and osteomyelitis could also not be excluded.  He has been admitted to the medicine service, and is on antibiotics.  He has had 2 sets  of blood cultures checked as well.  He is scheduled for a CT of the face with contrast to further evaluate these findings.  He has not had any chest discomfort other than when raising his arms and with positional changes.  He denied any increasing shortness of breath.  He also does state that he has had significant cold intolerance recently.    Past Medical History:   Diagnosis Date   • CAD (coronary artery disease)    • COPD (chronic obstructive pulmonary disease)    • Diabetes    • Hyperlipidemia    • Myocardial infarction 03/2011    tx with PCI   • Sinus bradycardia    • ST elevation myocardial infarction involving left anterior descending (LAD) coronary artery 5/6/2016 2011 tx with PCI         Past Surgical History:   Procedure Laterality Date   • CARDIAC CATHETERIZATION      2011:3.0x15mm Vision pLAD; 3.5x20mm Vision p circ; 3.5x28mm Vision to mRCA   • CORONARY ANGIOPLASTY     • CORONARY STENT PLACEMENT      2011:3.0x15mm Vision pLAD; 3.5x20mm Vision p circ; 3.5x28mm Vision to mRCA         No Known Allergies      No current facility-administered medications on file prior to encounter.      Current Outpatient Prescriptions on File Prior to Encounter   Medication Sig Dispense Refill   • albuterol (PROVENTIL HFA;VENTOLIN HFA) 108 (90 BASE) MCG/ACT inhaler Inhale 2 puffs Every 4 (Four) Hours As Needed for wheezing. 6.7 g 11   • aspirin 81 MG tablet Take 81 mg by mouth Daily.     • levothyroxine (SYNTHROID, LEVOTHROID) 100 MCG tablet Take 100 mcg by mouth daily.     • pravastatin (PRAVACHOL) 40 MG tablet Take 40 mg by mouth Daily.           Social History     Social History   • Marital status:      Spouse name: N/A   • Number of children: N/A   • Years of education: N/A     Occupational History   • Not on file.     Social History Main Topics   • Smoking status: Former Smoker   • Smokeless tobacco: Not on file   • Alcohol use No   • Drug use: No   • Sexual activity: Defer     Other Topics Concern   • Not on  "file     Social History Narrative         Family History   Problem Relation Age of Onset   • No Known Problems Mother    • Cancer Father    • No Known Problems Sister    • No Known Problems Brother    • No Known Problems Maternal Grandmother    • No Known Problems Maternal Grandfather    • No Known Problems Paternal Grandmother    • No Known Problems Paternal Grandfather    • No Known Problems Brother    • No Known Problems Sister    • Brain cancer Brother          REVIEW OF SYSTEMS:   12 point ROS was performed and is negative except as outlined in HPI    REVIEW OF SYSTEMS:   CONSTITUTIONAL: No weight loss.  HEENT: No visual changes or hearing changes.  SKIN: No rash.   RESPIRATORY: No cough or hemoptysis.  GASTROINTESTINAL: No abdominal pain, melena, nausea, or vomiting.  GENITOURINARY: No dysuria or frequency.  NEUROLOGICAL: No numbness or tingling in the extremities.   MUSCULOSKELETAL: No new joint pain.    HEMATOLOGIC: No bleeding.   LYMPHATICS: No enlarged nodes.   PSYCHIATRIC: No severe depression or anxiety.    ENDOCRINOLOGIC: Cold intolerance, as above.     Objective:     Vitals:    06/02/17 1733 06/02/17 1802 06/02/17 1843 06/02/17 1901   BP: 130/85 136/97 112/73 120/73   Pulse: 77 80 80 80   Resp:   16    Temp:   98.4 °F (36.9 °C)    TempSrc:   Tympanic    SpO2: 99% 100% 97% 99%   Weight:       Height:         Body mass index is 23.89 kg/(m^2).  Flowsheet Rows         First Filed Value    Admission Height  66\" (167.6 cm) Documented at 06/02/2017 1236    Admission Weight  148 lb (67.1 kg) Documented at 06/02/2017 1236           General:    No acute distress, alert and oriented x4, pleasant                   Head:    Normocephalic, atraumatic.   Eyes:          Conjunctivae and sclerae normal, no icterus, PERRLA   Throat:   Poor dentition, no thrush, oral mucosa moist.    Neck:   Supple, trachea midline.   Lungs:     Clear to auscultation bilaterally     Heart:    Regular rhythm and normal rate.  No murmurs, " gallops, or rubs noted.   Abdomen:     Soft, non-tender, non-distended, positive bowel sounds.    Extremities:   No clubbing, cyanosis, or edema.     Pulses:   Pulses palpable and equal bilaterally.    Skin:   No bleeding or rash.   Neuro:   Non-focal.  Moves all extremities well.    Psychiatric:   Normal mood and affect.     Lab Review:                  Results from last 7 days  Lab Units 06/02/17  1238   SODIUM mmol/L 141   POTASSIUM mmol/L 3.9   CHLORIDE mmol/L 101   TOTAL CO2 mmol/L 23.8   BUN mg/dL 21   CREATININE mg/dL 1.11   GLUCOSE mg/dL 141*   CALCIUM mg/dL 9.2       Results from last 7 days  Lab Units 06/02/17  1238   TROPONIN T ng/mL <0.010       Results from last 7 days  Lab Units 06/02/17  1238   WBC 10*3/mm3 5.07   HEMOGLOBIN g/dL 16.7   HEMATOCRIT % 47.5   PLATELETS 10*3/mm3 109*                       EKG (reviewed by me personally): NSR, LAE, PRWP      Assessment:   1. Recurrent syncope  2. Fever and night sweats   3. Right mandibular periapical abscess  4. Coronary artery disease - status post extensive bare-metal stenting in 2011 at time of acute anterior MI.  5. Diabetes  6. COPD    Plan:       This is the patient's second syncopal episode within the last several weeks.  On both occasions, he had no prodrome or warning, and simply woke up on the ground.  Obviously, this warrants a full workup.  An echocardiogram has already been ordered.  I am also going to order a bilateral carotid artery Doppler ultrasound (especially given his history of coronary disease).  He will be ruled out for myocardial infarction, although he does not have any ischemic changes on his EKG, and this is unlikely to be ischemic in nature.  He is getting IV antibiotics for a possible right mandibular periapical abscess, and he is also had full cultures thus far.  There is also some concern about osteomyelitis, and he is scheduled for a facial CT scan with contrast.  Oral surgery and infectious disease consults have also been  placed at this point.  He will need some sort of monitoring at discharge if the cause of his symptoms cannot be determined.  However, I would be very cautious about anything implantable such as a loop recorder, especially if he is infected currently.  We will continue to follow the patient with you make recommendations accordingly.    Thank you very much this consult.    Fuad Maguire M.D.

## 2017-06-03 NOTE — PLAN OF CARE
Problem: Patient Care Overview (Adult)  Goal: Plan of Care Review  Outcome: Ongoing (interventions implemented as appropriate)    06/03/17 1329   Coping/Psychosocial Response Interventions   Plan Of Care Reviewed With patient   Patient Care Overview   Progress improving   Outcome Evaluation   Outcome Summary/Follow up Plan Pt A&Ox4, RA, NSR, Dr. Handley called, wants to f/u out patient, MD Castillo aware, pt tolerating regular diet, tolerating ABX and IVF, c/o pain in knees norco given prn, wife at bs will monitor.        Goal: Discharge Needs Assessment  Outcome: Ongoing (interventions implemented as appropriate)    Problem: Fall Risk (Adult)  Goal: Identify Related Risk Factors and Signs and Symptoms  Outcome: Outcome(s) achieved Date Met:  06/03/17  Goal: Absence of Falls  Outcome: Ongoing (interventions implemented as appropriate)    Problem: Infection, Risk/Actual (Adult)  Goal: Identify Related Risk Factors and Signs and Symptoms  Outcome: Outcome(s) achieved Date Met:  06/03/17  Goal: Infection Prevention/Resolution  Outcome: Ongoing (interventions implemented as appropriate)

## 2017-06-03 NOTE — PROGRESS NOTES
Name: Db Hardin ADMIT: 2017   : 1950  PCP: MICHAEL Collado    MRN: [id number] LOS: 1 days   AGE/SEX: 66 y.o. male  ROOM: [facility]   Subjective      Very concerned about his syncope, no prodrome  /Shortness of breath or chest pain  No nausea, vomiting  No diarrhea, continues with mouth pain  Subjective    Objective   Vital Signs  Temp:  [97.8 °F (36.6 °C)-98.4 °F (36.9 °C)] 98.3 °F (36.8 °C)  Heart Rate:  [78-85] 78  Resp:  [16] 16  BP: ()/(65-97) 97/65  SpO2:  [92 %-100 %] 92 %  on  Flow (L/min):  [1] 1;   O2 Device: room air  Body mass index is 25.87 kg/(m^2).    Physical Exam   Constitutional: He is oriented to person, place, and time. He appears well-developed and well-nourished.   HENT:   Head: Normocephalic and atraumatic.   Eyes: EOM are normal. Pupils are equal, round, and reactive to light.   Neck: No JVD present.   Cardiovascular: Normal rate and regular rhythm.  Exam reveals no friction rub.    No murmur heard.  Pulmonary/Chest: Effort normal and breath sounds normal. No stridor. No respiratory distress. He has no wheezes.   Abdominal: Soft. Bowel sounds are normal. He exhibits no distension. There is no tenderness.   Musculoskeletal: He exhibits no tenderness or deformity.   Neurological: He is alert and oriented to person, place, and time.   Skin: Skin is warm and dry.   Psychiatric: He has a normal mood and affect. His behavior is normal.       Results Review:       I reviewed the patient's new clinical results.    Results from last 7 days  Lab Units 17  0554 17  1238   WBC 10*3/mm3 3.45* 5.07   HEMOGLOBIN g/dL 14.1 16.7   PLATELETS 10*3/mm3 85* 109*     Results from last 7 days  Lab Units 17  0554 17  1238   SODIUM mmol/L 138 141   POTASSIUM mmol/L 4.3 3.9   CHLORIDE mmol/L 103 101   TOTAL CO2 mmol/L 24.4 23.8   BUN mg/dL 25* 21   CREATININE mg/dL 0.94 1.11   GLUCOSE mg/dL 99 141*   Estimated Creatinine Clearance: 67.9 mL/min (by C-G formula  based on Cr of 0.94).  Results from last 7 days  Lab Units 06/03/17  0554 06/02/17  1238   CALCIUM mg/dL 8.3* 9.2   ALBUMIN g/dL 3.10* 3.80   MAGNESIUM mg/dL 1.7  --          aspirin 81 mg Oral Daily   atorvastatin 80 mg Oral Daily   ceftriaxone 2 g Intravenous Q24H   enoxaparin 40 mg Subcutaneous Q24H   levothyroxine 100 mcg Oral Q AM       sodium chloride 125 mL/hr Last Rate: 125 mL/hr (06/03/17 0711)   Diet Regular      Assessment/Plan   Assessment:     Active Hospital Problems (** Indicates Principal Problem)    Diagnosis Date Noted   • **Syncope and collapse [R55] 06/02/2017   • Periapical abscess [K04.7] 06/02/2017   • Fever [R50.9] 06/02/2017   • Chest pain [R07.9] 05/17/2017      Resolved Hospital Problems    Diagnosis Date Noted Date Resolved   No resolved problems to display.       Plan:     -Syncope: 2nd occurrence in 2weeks. Concern for cardiac syncopal as there is no prodrome, 2-D echo done, report pending, appreciate cardiology    -Periapical Abscess: Confirmed on CT face.  No evidence of soft tissue abscess.  Dr. Saavedra with oral surgery has been consulted but cannot see him as an inpatient.  CT of the facial bones was read to him by the nurse and he stated the patient could see him as an outpatient.  Infectious diseases has been consulted and discontinued vancomycin.  Continue ceftriaxone.  Suspect we could change to oral antibiotics tomorrow.  Blood cultures no growth to date    -Lovenox Ppx      Disposition  TBD.      Mariano Castillo MD  [facility]  06/03/17  5:38 PM

## 2017-06-03 NOTE — PLAN OF CARE
Problem: Patient Care Overview (Adult)  Goal: Plan of Care Review  Outcome: Ongoing (interventions implemented as appropriate)    06/03/17 0357   Coping/Psychosocial Response Interventions   Plan Of Care Reviewed With patient   Patient Care Overview   Progress no change   Outcome Evaluation   Outcome Summary/Follow up Plan Pt is a new admit .Pt c/o knee pain.PRN pain meds given with relief noted.Cardiologist.Infectious Disease and oral and maxillofacial surgery was consulted.Pt on Iv atb no adverse reaction noted.Will continue to monitor.       Goal: Adult Individualization and Mutuality  Outcome: Ongoing (interventions implemented as appropriate)    Problem: Fall Risk (Adult)  Goal: Identify Related Risk Factors and Signs and Symptoms  Outcome: Ongoing (interventions implemented as appropriate)  Goal: Absence of Falls  Outcome: Ongoing (interventions implemented as appropriate)    Problem: Infection, Risk/Actual (Adult)  Goal: Identify Related Risk Factors and Signs and Symptoms  Outcome: Ongoing (interventions implemented as appropriate)  Goal: Infection Prevention/Resolution  Outcome: Ongoing (interventions implemented as appropriate)

## 2017-06-03 NOTE — PROGRESS NOTES
Hospital Follow Up    Patient off the floor.  His labs and results so far were reviewed.  I will follow up with him further in the am.        1. Recurrent syncope: Recent stress test negative for ischemia. Troponins negative. Carotid ultrasound with only mild plaque.  Seriel EKG's unremarkable.  Echocardiogram pending.   2. Right mandibular periapical abscess  3. Coronary artery disease: With prior bare metal stents in 2011.  Stress test in 5/2017 was negative for ischemia.   4.  Thrombocytopenia: Newly noted on this admission.  Trending down since admit.  Consider hematology work up if it continues to decline.    5.  Abnormal TSH     -  Follow up echocardiogram results.  If unremarkable plan on setting up for long term monitoring with either Zio patch or event monitor at discharge  -  Follow platelet counts  -  Check T3, T4     Geraldine Ballesteros MD  06/03/17  3:53 PM

## 2017-06-04 PROBLEM — E05.90 HYPERTHYROIDISM: Status: ACTIVE | Noted: 2017-06-04

## 2017-06-04 LAB
BACTERIA SPEC AEROBE CULT: NO GROWTH
BH CV ECHO MEAS - ACS: 1.7 CM
BH CV ECHO MEAS - AO MAX PG: 7 MMHG
BH CV ECHO MEAS - AO MEAN PG (FULL): 3 MMHG
BH CV ECHO MEAS - AO MEAN PG: 4 MMHG
BH CV ECHO MEAS - AO ROOT AREA (BSA CORRECTED): 2.1
BH CV ECHO MEAS - AO ROOT AREA: 11.3 CM^2
BH CV ECHO MEAS - AO ROOT DIAM: 3.8 CM
BH CV ECHO MEAS - AO V2 MAX: 134 CM/SEC
BH CV ECHO MEAS - AO V2 MEAN: 95.8 CM/SEC
BH CV ECHO MEAS - AO V2 VTI: 26.6 CM
BH CV ECHO MEAS - AVA(I,A): 1.9 CM^2
BH CV ECHO MEAS - AVA(I,D): 1.9 CM^2
BH CV ECHO MEAS - BSA(HAYCOCK): 1.9 M^2
BH CV ECHO MEAS - BSA: 1.8 M^2
BH CV ECHO MEAS - BZI_BMI: 25.8 KILOGRAMS/M^2
BH CV ECHO MEAS - BZI_METRIC_HEIGHT: 167.6 CM
BH CV ECHO MEAS - BZI_METRIC_WEIGHT: 72.6 KG
BH CV ECHO MEAS - CONTRAST EF (2CH): 54.2 ML/M^2
BH CV ECHO MEAS - CONTRAST EF 4CH: 61.9 ML/M^2
BH CV ECHO MEAS - EDV(CUBED): 103.8 ML
BH CV ECHO MEAS - EDV(MOD-SP2): 59 ML
BH CV ECHO MEAS - EDV(MOD-SP4): 63 ML
BH CV ECHO MEAS - EDV(TEICH): 102.4 ML
BH CV ECHO MEAS - EF(CUBED): 55.1 %
BH CV ECHO MEAS - EF(MOD-SP2): 54.2 %
BH CV ECHO MEAS - EF(MOD-SP4): 61.9 %
BH CV ECHO MEAS - EF(TEICH): 46.8 %
BH CV ECHO MEAS - ESV(CUBED): 46.7 ML
BH CV ECHO MEAS - ESV(MOD-SP2): 27 ML
BH CV ECHO MEAS - ESV(MOD-SP4): 24 ML
BH CV ECHO MEAS - ESV(TEICH): 54.4 ML
BH CV ECHO MEAS - FS: 23.4 %
BH CV ECHO MEAS - IVS/LVPW: 1
BH CV ECHO MEAS - IVSD: 0.6 CM
BH CV ECHO MEAS - LA DIMENSION: 2.7 CM
BH CV ECHO MEAS - LA/AO: 0.71
BH CV ECHO MEAS - LAT PEAK E' VEL: 10 CM/SEC
BH CV ECHO MEAS - LV DIASTOLIC VOL/BSA (35-75): 34.6 ML/M^2
BH CV ECHO MEAS - LV MASS(C)D: 85.1 GRAMS
BH CV ECHO MEAS - LV MASS(C)DI: 46.8 GRAMS/M^2
BH CV ECHO MEAS - LV MEAN PG: 1 MMHG
BH CV ECHO MEAS - LV SYSTOLIC VOL/BSA (12-30): 13.2 ML/M^2
BH CV ECHO MEAS - LV V1 MAX: 75 CM/SEC
BH CV ECHO MEAS - LV V1 MEAN: 51.9 CM/SEC
BH CV ECHO MEAS - LV V1 VTI: 15.9 CM
BH CV ECHO MEAS - LVIDD: 4.7 CM
BH CV ECHO MEAS - LVIDS: 3.6 CM
BH CV ECHO MEAS - LVLD AP2: 7.7 CM
BH CV ECHO MEAS - LVLD AP4: 7.8 CM
BH CV ECHO MEAS - LVLS AP2: 6.6 CM
BH CV ECHO MEAS - LVLS AP4: 6.5 CM
BH CV ECHO MEAS - LVOT AREA (M): 3.1 CM^2
BH CV ECHO MEAS - LVOT AREA: 3.1 CM^2
BH CV ECHO MEAS - LVOT DIAM: 2 CM
BH CV ECHO MEAS - LVPWD: 0.6 CM
BH CV ECHO MEAS - MED PEAK E' VEL: 11 CM/SEC
BH CV ECHO MEAS - MV A DUR: 0.14 SEC
BH CV ECHO MEAS - MV A MAX VEL: 79 CM/SEC
BH CV ECHO MEAS - MV DEC SLOPE: 367 CM/SEC^2
BH CV ECHO MEAS - MV DEC TIME: 0.15 SEC
BH CV ECHO MEAS - MV E MAX VEL: 81.4 CM/SEC
BH CV ECHO MEAS - MV E/A: 1
BH CV ECHO MEAS - MV MEAN PG: 2 MMHG
BH CV ECHO MEAS - MV P1/2T MAX VEL: 89.3 CM/SEC
BH CV ECHO MEAS - MV P1/2T: 71.3 MSEC
BH CV ECHO MEAS - MV V2 MEAN: 58.6 CM/SEC
BH CV ECHO MEAS - MV V2 VTI: 25.6 CM
BH CV ECHO MEAS - MVA P1/2T LCG: 2.5 CM^2
BH CV ECHO MEAS - MVA(P1/2T): 3.1 CM^2
BH CV ECHO MEAS - MVA(VTI): 2 CM^2
BH CV ECHO MEAS - PA ACC SLOPE: 1332 CM/SEC^2
BH CV ECHO MEAS - PA ACC TIME: 0.08 SEC
BH CV ECHO MEAS - PA MAX PG: 4.3 MMHG
BH CV ECHO MEAS - PA PR(ACCEL): 44.4 MMHG
BH CV ECHO MEAS - PA V2 MAX: 104 CM/SEC
BH CV ECHO MEAS - PULM A REVS DUR: 0.15 SEC
BH CV ECHO MEAS - PULM A REVS VEL: 34.1 CM/SEC
BH CV ECHO MEAS - PULM DIAS VEL: 58.7 CM/SEC
BH CV ECHO MEAS - PULM S/D: 1.1
BH CV ECHO MEAS - PULM SYS VEL: 62.2 CM/SEC
BH CV ECHO MEAS - QP/QS: 0.46
BH CV ECHO MEAS - RAP SYSTOLE: 3 MMHG
BH CV ECHO MEAS - RV MEAN PG: 1 MMHG
BH CV ECHO MEAS - RV V1 MEAN: 37.5 CM/SEC
BH CV ECHO MEAS - RV V1 VTI: 9.1 CM
BH CV ECHO MEAS - RVOT AREA: 2.5 CM^2
BH CV ECHO MEAS - RVOT DIAM: 1.8 CM
BH CV ECHO MEAS - RVSP: 37.1 MMHG
BH CV ECHO MEAS - SI(AO): 165.8 ML/M^2
BH CV ECHO MEAS - SI(CUBED): 31.4 ML/M^2
BH CV ECHO MEAS - SI(LVOT): 27.5 ML/M^2
BH CV ECHO MEAS - SI(MOD-SP2): 17.6 ML/M^2
BH CV ECHO MEAS - SI(MOD-SP4): 21.4 ML/M^2
BH CV ECHO MEAS - SI(TEICH): 26.3 ML/M^2
BH CV ECHO MEAS - SV(AO): 301.7 ML
BH CV ECHO MEAS - SV(CUBED): 57.2 ML
BH CV ECHO MEAS - SV(LVOT): 50 ML
BH CV ECHO MEAS - SV(MOD-SP2): 32 ML
BH CV ECHO MEAS - SV(MOD-SP4): 39 ML
BH CV ECHO MEAS - SV(RVOT): 23.2 ML
BH CV ECHO MEAS - SV(TEICH): 47.9 ML
BH CV ECHO MEAS - TAPSE (>1.6): 1.7 CM2
BH CV ECHO MEAS - TR MAX VEL: 292 CM/SEC
BH CV VAS BP RIGHT ARM: NORMAL MMHG
BH CV XLRA - RV BASE: 3.8 CM
BH CV XLRA - RV LENGTH: 6.9 CM
BH CV XLRA - RV MID: 2.6 CM
BH CV XLRA - TDI S': 15 CM/SEC
CRP SERPL-MCNC: 3.01 MG/DL (ref 0–0.5)
DEPRECATED RDW RBC AUTO: 45.4 FL (ref 37–54)
E/E' RATIO: 8
ERYTHROCYTE [DISTWIDTH] IN BLOOD BY AUTOMATED COUNT: 12.9 % (ref 11.5–14.5)
ERYTHROCYTE [SEDIMENTATION RATE] IN BLOOD: 4 MM/HR (ref 0–20)
HCT VFR BLD AUTO: 38.2 % (ref 40.4–52.2)
HGB BLD-MCNC: 13.2 G/DL (ref 13.7–17.6)
LEFT ATRIUM VOLUME INDEX: 15 ML/M2
LEFT ATRIUM VOLUME: 26 CM3
LYMPHOCYTES # BLD MANUAL: 0.92 10*3/MM3 (ref 0.9–4.8)
LYMPHOCYTES NFR BLD MANUAL: 28 % (ref 19.6–45.3)
LYMPHOCYTES NFR BLD MANUAL: 6 % (ref 5–12)
MCH RBC QN AUTO: 33.2 PG (ref 27–32.7)
MCHC RBC AUTO-ENTMCNC: 34.6 G/DL (ref 32.6–36.4)
MCV RBC AUTO: 96 FL (ref 79.8–96.2)
MONOCYTES # BLD AUTO: 0.2 10*3/MM3 (ref 0.2–1.2)
NEUTROPHILS # BLD AUTO: 1.91 10*3/MM3 (ref 1.9–8.1)
NEUTROPHILS NFR BLD MANUAL: 58 % (ref 42.7–76)
PLAT MORPH BLD: NORMAL
PLATELET # BLD AUTO: 70 10*3/MM3 (ref 140–500)
PMV BLD AUTO: 11.3 FL (ref 6–12)
PROCALCITONIN SERPL-MCNC: 0.45 NG/ML (ref 0.1–0.25)
RBC # BLD AUTO: 3.98 10*6/MM3 (ref 4.6–6)
RBC MORPH BLD: NORMAL
SCAN SLIDE: NORMAL
VANCOMYCIN TROUGH SERPL-MCNC: 2 MCG/ML (ref 5–20)
VARIANT LYMPHS NFR BLD MANUAL: 8 % (ref 0–5)
WBC MORPH BLD: NORMAL
WBC NRBC COR # BLD: 3.29 10*3/MM3 (ref 4.5–10.7)

## 2017-06-04 PROCEDURE — 85652 RBC SED RATE AUTOMATED: CPT | Performed by: INTERNAL MEDICINE

## 2017-06-04 PROCEDURE — 85007 BL SMEAR W/DIFF WBC COUNT: CPT | Performed by: INTERNAL MEDICINE

## 2017-06-04 PROCEDURE — 99232 SBSQ HOSP IP/OBS MODERATE 35: CPT | Performed by: INTERNAL MEDICINE

## 2017-06-04 PROCEDURE — 85025 COMPLETE CBC W/AUTO DIFF WBC: CPT | Performed by: INTERNAL MEDICINE

## 2017-06-04 PROCEDURE — 84145 PROCALCITONIN (PCT): CPT | Performed by: INTERNAL MEDICINE

## 2017-06-04 PROCEDURE — 80202 ASSAY OF VANCOMYCIN: CPT | Performed by: INTERNAL MEDICINE

## 2017-06-04 PROCEDURE — 86140 C-REACTIVE PROTEIN: CPT | Performed by: INTERNAL MEDICINE

## 2017-06-04 RX ORDER — AMOXICILLIN AND CLAVULANATE POTASSIUM 875; 125 MG/1; MG/1
1 TABLET, FILM COATED ORAL EVERY 12 HOURS SCHEDULED
Status: DISCONTINUED | OUTPATIENT
Start: 2017-06-04 | End: 2017-06-05 | Stop reason: HOSPADM

## 2017-06-04 RX ORDER — LEVOTHYROXINE SODIUM 0.05 MG/1
50 TABLET ORAL
Status: DISCONTINUED | OUTPATIENT
Start: 2017-06-05 | End: 2017-06-05 | Stop reason: HOSPADM

## 2017-06-04 RX ADMIN — ATORVASTATIN CALCIUM 80 MG: 40 TABLET, FILM COATED ORAL at 08:48

## 2017-06-04 RX ADMIN — HYDROCODONE BITARTRATE AND ACETAMINOPHEN 1 TABLET: 5; 325 TABLET ORAL at 21:58

## 2017-06-04 RX ADMIN — SODIUM CHLORIDE 125 ML/HR: 9 INJECTION, SOLUTION INTRAVENOUS at 05:46

## 2017-06-04 RX ADMIN — LEVOTHYROXINE SODIUM 100 MCG: 100 TABLET ORAL at 05:45

## 2017-06-04 RX ADMIN — ASPIRIN 81 MG: 81 TABLET ORAL at 08:48

## 2017-06-04 RX ADMIN — AMOXICILLIN AND CLAVULANATE POTASSIUM 1 TABLET: 875; 125 TABLET, FILM COATED ORAL at 21:39

## 2017-06-04 NOTE — PROGRESS NOTES
LOS: 2 days   Patient Care Team:  MICHAEL Collado as PCP - General (Family Medicine)    Chief Complaint: syncope       Interval History: No cardiac complaints.      Objective   Vital Signs  Temp:  [97.1 °F (36.2 °C)-98.3 °F (36.8 °C)] 97.1 °F (36.2 °C)  Heart Rate:  [78-92] 92  Resp:  [16] 16  BP: ()/(65-84) 118/75    Intake/Output Summary (Last 24 hours) at 06/04/17 1247  Last data filed at 06/04/17 0634   Gross per 24 hour   Intake             3243 ml   Output                0 ml   Net             3243 ml       Comfortable NAD  PERRL, conjunctiva clear  Neck supple, no JVD or thyromegaly appreciated  S1/S2 RRR, no m/r/g  Lungs CTA B, normal effort  Abdomen S/NT/ND (+) BS, no HSM appreciated  Extremities warm, no clubbing, cyanosis, or edema  Normal gait  No visible or palpable skin lesions  A/Ox4, mood and affect appropriate    Results Review:        Results from last 7 days  Lab Units 06/03/17  0554 06/02/17  1238   SODIUM mmol/L 138 141   POTASSIUM mmol/L 4.3 3.9   CHLORIDE mmol/L 103 101   TOTAL CO2 mmol/L 24.4 23.8   BUN mg/dL 25* 21   CREATININE mg/dL 0.94 1.11   GLUCOSE mg/dL 99 141*   CALCIUM mg/dL 8.3* 9.2       Results from last 7 days  Lab Units 06/03/17  1157 06/03/17  0554 06/02/17  2359   TROPONIN T ng/mL <0.010 <0.010 <0.010       Results from last 7 days  Lab Units 06/04/17  0544 06/03/17  0554 06/02/17  1238   WBC 10*3/mm3 3.29* 3.45* 5.07   HEMOGLOBIN g/dL 13.2* 14.1 16.7   HEMATOCRIT % 38.2* 41.3 47.5   PLATELETS 10*3/mm3 70* 85* 109*       Results from last 7 days  Lab Units 06/03/17  0554   INR  1.15*           Results from last 7 days  Lab Units 06/03/17  0554   MAGNESIUM mg/dL 1.7           I reviewed the patient's new clinical results.  I personally viewed and interpreted the patient's EKG/Telemetry data        Medication Review:     amoxicillin-clavulanate 1 tablet Oral Q12H   aspirin 81 mg Oral Daily   atorvastatin 80 mg Oral Daily   enoxaparin 40 mg Subcutaneous Q24H    levothyroxine 100 mcg Oral Q AM         sodium chloride 125 mL/hr Last Rate: 125 mL/hr (06/04/17 0546)       Assessment/Plan     Principal Problem:    Syncope and collapse  Active Problems:    Chest pain    Periapical abscess    Fever    1. Recurrent syncope: Recent stress test negative for ischemia. Troponins negative. Carotid ultrasound with only mild plaque.  Seriel EKG's unremarkable.  Echocardiogram normal except for ASD/PFO.   2. Right mandibular periapical abscess  3. Coronary artery disease: With prior bare metal stents in 2011.  Stress test in 5/2017 was negative for ischemia.   4.  Thrombocytopenia: Newly noted on this admission.  Trending down since admit.  Consider hematology work up if it continues to decline.    5.  Abnormal TSH     -  Follow up echocardiogram results.  If unremarkable plan on setting up for long term monitoring with either Zio patch or event monitor at discharge  -  Follow platelet counts  -  Check T3, T4     Philippe Hamilton MD  06/04/17  12:47 PM

## 2017-06-04 NOTE — PROGRESS NOTES
INFECTIOUS DISEASES PROGRESS NOTE    CC: f/u syncope    S:   Presyncopal today but has not passed out  No f/c/ns    O:  Physical Exam:  Temp:  [97.1 °F (36.2 °C)-98.3 °F (36.8 °C)] 97.1 °F (36.2 °C)  Heart Rate:  [78-92] 92  Resp:  [16] 16  BP: ()/(65-84) 118/75  Physical Exam   Constitutional: He appears well-developed. No distress.   Pulmonary/Chest: Effort normal.   Abdominal: Soft. He exhibits no distension. There is no tenderness.   Neurological: He is alert.   Skin: Skin is warm.   OP with carious teeth     Diagnostics:     BCx ngtd  WBC 3.3 (p58, L 28, M 6)  PLT 70  CRP 3    PCT 0.45    CT facial bones: no osteo.  Periapical abscess    tte LVEF 61%, Atrial shunt, RVSP 37mm hg; mild rt     Assessment/Plan   Syncope, no apaprent infectious explanation  Periapical abscess  Pancytopenia? No clear etiology. Defer to primary.    No evidence of systemic infection and will change ceftriaxone to Augmentin 875mg/125 q12 x5 more days.    Nothing more to add.  Please let me know if I can be of additional assistance.    Avinash Schmitz MD  10:32 AM  06/04/17

## 2017-06-04 NOTE — PLAN OF CARE
Problem: Patient Care Overview (Adult)  Goal: Plan of Care Review  Outcome: Ongoing (interventions implemented as appropriate)    06/04/17 0429   Coping/Psychosocial Response Interventions   Plan Of Care Reviewed With patient   Patient Care Overview   Progress improving   Outcome Evaluation   Outcome Summary/Follow up Plan Cont on IV atb no adverse reaction noted.Pt denies any pain or discomfort.Will continue to monitor.       Goal: Adult Individualization and Mutuality  Outcome: Ongoing (interventions implemented as appropriate)    Problem: Fall Risk (Adult)  Goal: Absence of Falls  Outcome: Ongoing (interventions implemented as appropriate)    Problem: Infection, Risk/Actual (Adult)  Goal: Infection Prevention/Resolution  Outcome: Ongoing (interventions implemented as appropriate)

## 2017-06-04 NOTE — PROGRESS NOTES
Name: Db Hardin ADMIT: 2017   : 1950  PCP: MICHAEL Collado    MRN: [id number] LOS: 2 days   AGE/SEX: 66 y.o. male  ROOM: [facility]   Subjective    No more syncope here  No Shortness of breath or chest pain  No nausea, vomiting  No diarrhea, continues with mouth pain but this is chronic issue  Subjective    Objective   Vital Signs  Temp:  [97.1 °F (36.2 °C)-98.3 °F (36.8 °C)] 97.1 °F (36.2 °C)  Heart Rate:  [78-92] 92  Resp:  [16] 16  BP: ()/(65-84) 118/75  SpO2:  [90 %-97 %] 97 %  on  Flow (L/min):  [1-2] 2;   O2 Device: nasal cannula  Body mass index is 23.82 kg/(m^2).    Physical Exam   Constitutional: He is oriented to person, place, and time. He appears well-developed and well-nourished.   HENT:   Head: Normocephalic and atraumatic.   Eyes: EOM are normal. Pupils are equal, round, and reactive to light.   Neck: No JVD present.   Cardiovascular: Normal rate and regular rhythm.  Exam reveals no friction rub.    No murmur heard.  Pulmonary/Chest: Effort normal and breath sounds normal. No stridor. No respiratory distress. He has no wheezes.   Abdominal: Soft. Bowel sounds are normal. He exhibits no distension. There is no tenderness.   Musculoskeletal: He exhibits no tenderness or deformity.   Neurological: He is alert and oriented to person, place, and time.   Skin: Skin is warm and dry.   Psychiatric: He has a normal mood and affect. His behavior is normal.       Results Review:       I reviewed the patient's new clinical results.    Results from last 7 days  Lab Units 17  0544 17  0554 17  1238   WBC 10*3/mm3 3.29* 3.45* 5.07   HEMOGLOBIN g/dL 13.2* 14.1 16.7   PLATELETS 10*3/mm3 70* 85* 109*       Results from last 7 days  Lab Units 17  0554 17  1238   SODIUM mmol/L 138 141   POTASSIUM mmol/L 4.3 3.9   CHLORIDE mmol/L 103 101   TOTAL CO2 mmol/L 24.4 23.8   BUN mg/dL 25* 21   CREATININE mg/dL 0.94 1.11   GLUCOSE mg/dL 99 141*   Estimated Creatinine  Clearance: 67.9 mL/min (by C-G formula based on Cr of 0.94).    Results from last 7 days  Lab Units 06/03/17  0554 06/02/17  1238   CALCIUM mg/dL 8.3* 9.2   ALBUMIN g/dL 3.10* 3.80   MAGNESIUM mg/dL 1.7  --          amoxicillin-clavulanate 1 tablet Oral Q12H   aspirin 81 mg Oral Daily   atorvastatin 80 mg Oral Daily   levothyroxine 100 mcg Oral Q AM      Diet Regular      Assessment/Plan   Assessment:     Active Hospital Problems (** Indicates Principal Problem)    Diagnosis Date Noted   • **Syncope and collapse [R55] 06/02/2017   • Hyperthyroidism [E05.90] 06/04/2017   • Periapical abscess [K04.7] 06/02/2017   • Fever [R50.9] 06/02/2017   • Chest pain [R07.9] 05/17/2017      Resolved Hospital Problems    Diagnosis Date Noted Date Resolved   No resolved problems to display.       Plan:     -Syncope: 2nd occurrence in 2weeks. Concerned for cardiac syncopal as there is no prodrome, 2-D echo shows PFO, appreciate cardiology and defer cardiac monitoring to them  -Hypothyroidism on synthroid: TSH very low and T4 high, cut back on synthroid to 50mcg, will need f/u TSH in 4 weeks  - Pancytopenia withThrombocytopenia worsening, stopped lovenox, stop IV fluids, recheck in am and if any lower may need hematology consult  -Periapical Abscess: Confirmed on CT face.  No evidence of soft tissue abscess.  Dr. Saavedra with oral surgery has been consulted but cannot see him as an inpatient.  CT of the facial bones was read to him by the nurse and he stated the patient could see him as an outpatient.  Infectious diseases has been consulted and discontinued vancomycin.  Continue ceftriaxone.  Suspect we could change to oral antibiotics tomorrow.  Blood cultures no growth to date      Disposition  Probably home tomorrow if platelets increased      Mariano Castillo MD  [facility]  06/04/17  1:32 PM

## 2017-06-04 NOTE — PLAN OF CARE
Problem: Patient Care Overview (Adult)  Goal: Plan of Care Review  Outcome: Ongoing (interventions implemented as appropriate)    06/04/17 1620   Coping/Psychosocial Response Interventions   Plan Of Care Reviewed With patient;spouse   Patient Care Overview   Progress improving   Outcome Evaluation   Outcome Summary/Follow up Plan Pt VSS stable. PO antibiotics started, increased synthroid, DC'd IVF and lovenox. Likely home tomorrow. Will continue to monitor

## 2017-06-05 ENCOUNTER — APPOINTMENT (OUTPATIENT)
Dept: CARDIOLOGY | Facility: HOSPITAL | Age: 67
End: 2017-06-05
Attending: INTERNAL MEDICINE

## 2017-06-05 VITALS
RESPIRATION RATE: 16 BRPM | SYSTOLIC BLOOD PRESSURE: 136 MMHG | OXYGEN SATURATION: 95 % | HEIGHT: 66 IN | HEART RATE: 86 BPM | TEMPERATURE: 98.4 F | BODY MASS INDEX: 23.72 KG/M2 | DIASTOLIC BLOOD PRESSURE: 77 MMHG | WEIGHT: 147.6 LBS

## 2017-06-05 LAB
DEPRECATED RDW RBC AUTO: 45.2 FL (ref 37–54)
ERYTHROCYTE [DISTWIDTH] IN BLOOD BY AUTOMATED COUNT: 13.1 % (ref 11.5–14.5)
HCT VFR BLD AUTO: 37 % (ref 40.4–52.2)
HGB BLD-MCNC: 12.9 G/DL (ref 13.7–17.6)
LYMPHOCYTES # BLD MANUAL: 2.2 10*3/MM3 (ref 0.9–4.8)
LYMPHOCYTES NFR BLD MANUAL: 11 % (ref 5–12)
LYMPHOCYTES NFR BLD MANUAL: 44 % (ref 19.6–45.3)
MCH RBC QN AUTO: 33 PG (ref 27–32.7)
MCHC RBC AUTO-ENTMCNC: 34.9 G/DL (ref 32.6–36.4)
MCV RBC AUTO: 94.6 FL (ref 79.8–96.2)
MONOCYTES # BLD AUTO: 0.55 10*3/MM3 (ref 0.2–1.2)
NEUTROPHILS # BLD AUTO: 2.1 10*3/MM3 (ref 1.9–8.1)
NEUTROPHILS NFR BLD MANUAL: 42 % (ref 42.7–76)
PLAT MORPH BLD: NORMAL
PLATELET # BLD AUTO: 73 10*3/MM3 (ref 140–500)
PMV BLD AUTO: 11.7 FL (ref 6–12)
RBC # BLD AUTO: 3.91 10*6/MM3 (ref 4.6–6)
RBC MORPH BLD: NORMAL
SCAN SLIDE: NORMAL
VARIANT LYMPHS NFR BLD MANUAL: 3 % (ref 0–5)
WBC MORPH BLD: NORMAL
WBC NRBC COR # BLD: 4.99 10*3/MM3 (ref 4.5–10.7)

## 2017-06-05 PROCEDURE — 85025 COMPLETE CBC W/AUTO DIFF WBC: CPT | Performed by: INTERNAL MEDICINE

## 2017-06-05 PROCEDURE — 99233 SBSQ HOSP IP/OBS HIGH 50: CPT | Performed by: INTERNAL MEDICINE

## 2017-06-05 PROCEDURE — 86022 PLATELET ANTIBODIES: CPT | Performed by: INTERNAL MEDICINE

## 2017-06-05 PROCEDURE — 0296T HC EXT ECG > 48HR TO 21 DAY RCRD W/CONECT INTL RCRD: CPT

## 2017-06-05 PROCEDURE — 85007 BL SMEAR W/DIFF WBC COUNT: CPT | Performed by: INTERNAL MEDICINE

## 2017-06-05 RX ORDER — AMOXICILLIN AND CLAVULANATE POTASSIUM 875; 125 MG/1; MG/1
1 TABLET, FILM COATED ORAL EVERY 12 HOURS SCHEDULED
Qty: 9 TABLET | Refills: 0 | Status: SHIPPED | OUTPATIENT
Start: 2017-06-05 | End: 2017-06-09

## 2017-06-05 RX ORDER — LEVOTHYROXINE SODIUM 0.05 MG/1
100 TABLET ORAL DAILY
Qty: 30 TABLET | Refills: 1 | Status: SHIPPED | OUTPATIENT
Start: 2017-06-05 | End: 2022-08-11 | Stop reason: ALTCHOICE

## 2017-06-05 RX ADMIN — ATORVASTATIN CALCIUM 80 MG: 40 TABLET, FILM COATED ORAL at 08:21

## 2017-06-05 RX ADMIN — ASPIRIN 81 MG: 81 TABLET ORAL at 08:21

## 2017-06-05 RX ADMIN — AMOXICILLIN AND CLAVULANATE POTASSIUM 1 TABLET: 875; 125 TABLET, FILM COATED ORAL at 08:21

## 2017-06-05 RX ADMIN — LEVOTHYROXINE SODIUM 50 MCG: 50 TABLET ORAL at 07:07

## 2017-06-05 NOTE — DISCHARGE INSTR - LAB
YOU NEED TO SCHEDULE THIS APPOINTMENT:    Follow up with oral surgeon Dr. Handley within 1 week of discharge from hospital for dental abscess [phone] or [phone]

## 2017-06-05 NOTE — PROGRESS NOTES
"Db Hardin  1950 66 y.o.  [id number]      Patient Care Team:  MICHAEL Collado as PCP - General (Family Medicine)    CC: He is in with syncope    Interval History: No other complaints today      Objective   Vital Signs  Temp:  [98.3 °F (36.8 °C)-99.3 °F (37.4 °C)] 98.4 °F (36.9 °C)  Heart Rate:  [79-92] 86  Resp:  [16] 16  BP: (103-136)/(65-77) 136/77    Intake/Output Summary (Last 24 hours) at 06/05/17 0820  Last data filed at 06/05/17 0503   Gross per 24 hour   Intake              360 ml   Output                1 ml   Net              359 ml     Flowsheet Rows         First Filed Value    Admission Height  66\" (167.6 cm) Documented at 06/02/2017 1236    Admission Weight  148 lb (67.1 kg) Documented at 06/02/2017 1236          Physical Exam:   General Appearance:    Alert,oriented, in no acute distress   Lungs:     Clear to auscultation,BS are equal    Heart:    Normal S1 and S2, RRR without murmur, gallop or rub   HEENT:    Sclera are clear, no JVD or adenopathy   Abdomen:     Normal bowel sounds, soft non-tender, non-distended, no HSM   Extremities:   Moves all extremities well, no edema, no cyanosis, no             Redness, no rash     Medication Review:        amoxicillin-clavulanate 1 tablet Oral Q12H   aspirin 81 mg Oral Daily   atorvastatin 80 mg Oral Daily   levothyroxine 50 mcg Oral Q AM            I reviewed the patient's new clinical results.  I personally viewed and interpreted the patient's EKG/Telemetry data    Assessment/Plan  Active Hospital Problems (** Indicates Principal Problem)    Diagnosis Date Noted   • **Syncope and collapse [R55] 06/02/2017   • Hyperthyroidism [E05.90] 06/04/2017   • Periapical abscess [K04.7] 06/02/2017   • Fever [R50.9] 06/02/2017   • Chest pain [R07.9] 05/17/2017      Resolved Hospital Problems    Diagnosis Date Noted Date Resolved   No resolved problems to display.       He's had nirmala syncope twice in the last 2 weeks no clear etiology narrow QRS on " his ECG echo looks good normal LV function and his stress test last time and no evidence of ischemia I think we need to put his  ZIO patch on him if this continues to happen he might be a guide to consider a tilt table test on but is really nothing to suggest vasovagal with this    Sidney Resendiz MD  06/05/17  8:20 AM          I think he could go home today see Dara Olson in a week and see me in a month

## 2017-06-05 NOTE — PLAN OF CARE
Problem: Patient Care Overview (Adult)  Goal: Plan of Care Review  Outcome: Ongoing (interventions implemented as appropriate)    06/05/17 0517   Coping/Psychosocial Response Interventions   Plan Of Care Reviewed With patient   Patient Care Overview   Progress improving   Outcome Evaluation   Outcome Summary/Follow up Plan Quiet night, vss, no distress noticed, spouse was at bedside all night, medicated as ordered & tolerated well, pt was adequately cared for.       Goal: Adult Individualization and Mutuality  Outcome: Ongoing (interventions implemented as appropriate)  Goal: Discharge Needs Assessment  Outcome: Ongoing (interventions implemented as appropriate)    Problem: Fall Risk (Adult)  Goal: Absence of Falls  Outcome: Ongoing (interventions implemented as appropriate)    Problem: Infection, Risk/Actual (Adult)  Goal: Infection Prevention/Resolution  Outcome: Ongoing (interventions implemented as appropriate)

## 2017-06-05 NOTE — SIGNIFICANT NOTE
Had to wait on son in law to get here for ride back home, went over all d/c instruction with him along with wife and patient.

## 2017-06-05 NOTE — DISCHARGE INSTR - APPOINTMENTS
Follow up with Dr. Dara Olson June 12th Monday 10 am, [phone] [address]. Get platelet count checked.    Follow up with Dr. Resendiz July 7th 3 pm, [phone] [address]

## 2017-06-05 NOTE — DISCHARGE SUMMARY
Date of Admission: 6/2/2017  Date of Discharge:  6/5/2017    PCP: Carol Moss, MICHAEL      DISCHARGE DIAGNOSIS  Principal Problem:    Syncope and collapse  Active Problems:    Chest pain    Periapical abscess    Fever    Hyperthyroidism      SECONDARY DIAGNOSES  Past Medical History:   Diagnosis Date   • CAD (coronary artery disease)    • COPD (chronic obstructive pulmonary disease)    • Diabetes    • Hyperlipidemia    • Myocardial infarction 03/2011    tx with PCI   • Sinus bradycardia    • ST elevation myocardial infarction involving left anterior descending (LAD) coronary artery 5/6/2016 2011 tx with PCI       CONSULTS   Consults     Date and Time Order Name Status Description    6/2/2017 2051 Inpatient Consult to Infectious Diseases Completed     6/2/2017 2051 Inpatient Consult to Oral & Maxillofacial Surgery      6/2/2017 2051 Inpatient Consult to Cardiology Completed     6/2/2017 1806 [facility] (on-call MD unless specified) Completed     6/2/2017 1805 [facility] (on-call MD unless specified) Completed           PROCEDURES PERFORMED  CT Head:  No evidence of fracture or hemorrhage.    CT Facial Bones:  The patient is missing all of the maxillary teeth to the right of  midline and all of the left mandibular teeth to the left of midline.  Multiple caries are appreciated. There is periapical lucency suggesting  a periapical abscess adjacent to the right mandibular incisor as well as  adjacent to the posterior molar in the mandible on the right. There is  periapical lucency also appreciated involving the maxilla on the left  related to the 2nd molar. The same tooth is with a very large maximiliano,  which is approximately 1 cm in size. There is no evidence of a soft  tissue abscess.    2D Echo:  · Left ventricular systolic function is normal. Calculated EF = 61.9%.  · Left ventricular diastolic function is normal.  · Saline test results are positive and indicate an atrial level shunt.  · Mild tricuspid valve  "regurgitation is present.  · The calculated RVSP is 37 mmHg (normal)..  · There is no evidence of pericardial effusion.    Carotid ultrasound:  · Proximal right internal carotid artery mild stenosis.  · Proximal left internal carotid artery mild stenosis.    HOSPITAL COURSE  Patient is a 66 y.o. male presented to [facility] complaining of syncopal episode.  Please see the admitting history and physical for further details. He had one of these episodes just two weeks prior. Cardiology was consulted and ordered ultrasound of his carotids, which shows mild bilateral stenosis and an echo of his heart which was unremarkable. A clear cause for his syncope was not identified. Dr Resendiz felt that he could be discharged with a Zio patch and follow up with him in clinic in 1 month with possibility of a tilt table test at that time.     He was found to have a anthony-apical abscess while here. He was placed on Augmentin by ID service and will cont this through 6/9. Oral maxilfacial surgery was consulted and wanted to see him in clinic in 1 week as an outpatient. He was provided with number to arrange follow up at time of discharge.     His platelets dropped quite a bit since last admission (217 --> 109 --> 85 --> 70 --> 73). HIT antibody was sent but pending at discharge and needs to be followed up on. He will need repeat CBC within 1 week and PCP can follow up on this. His lovenox was stopped during this admission.     His synthroid dose was reduced based on low TSH and high free t4 while here.       CONDITION ON DISCHARGE  Stable.      VITAL SIGNS  /77 (BP Location: Right arm, Patient Position: Lying)  Pulse 86  Temp 98.4 °F (36.9 °C) (Oral)   Resp 16  Ht 66\" (167.6 cm)  Wt 147 lb 9.6 oz (67 kg)  SpO2 95%  BMI 23.82 kg/m2  Objective:  General Appearance:  Comfortable and well-appearing.    Vital signs: (most recent): Blood pressure 136/77, pulse 86, temperature 98.4 °F (36.9 °C), temperature source " "Oral, resp. rate 16, height 66\" (167.6 cm), weight 147 lb 9.6 oz (67 kg), SpO2 95 %.  Vital signs are normal.    HEENT: Normal HEENT exam.    Lungs:  Normal effort.  Breath sounds clear to auscultation.    Heart: Normal rate.  Regular rhythm.    Abdomen: Abdomen is soft and non-distended.  Bowel sounds are normal.   There is no abdominal tenderness.     Extremities: Normal range of motion.  There is no dependent edema.    Neurological: Patient is alert and oriented to person, place and time.    Skin:  Warm and dry.  No rash.               DISCHARGE DISPOSITION   Home or Self Care      DISCHARGE MEDICATIONS   Db Hardin   Home Medication Instructions KEO:[id number]    Printed on:06/05/17 1024   Medication Information                      albuterol (PROVENTIL HFA;VENTOLIN HFA) 108 (90 BASE) MCG/ACT inhaler  Inhale 2 puffs Every 4 (Four) Hours As Needed for wheezing.             amoxicillin-clavulanate (AUGMENTIN) 875-125 MG per tablet  Take 1 tablet by mouth Every 12 (Twelve) Hours for 4 days. Indications: Skin and Soft Tissue Infection             aspirin 81 MG tablet  Take 81 mg by mouth Daily.             levothyroxine (SYNTHROID, LEVOTHROID) 50 MCG tablet  Take 2 tablets by mouth Daily.             pravastatin (PRAVACHOL) 40 MG tablet  Take 40 mg by mouth Daily.                Future Appointments  Date Time Provider Department Center   5/21/2018 1:20 PM Sidney Resendiz MD [facility] None     Follow-up Information     Follow up with DAMARI Smith Follow up in 1 week(s).    Specialty:  Cardiology    Contact information:    [address] [place] [address]  [phone]          Follow up with Sidney Resendiz MD Follow up in 1 month(s).    Specialty:  Cardiology    Contact information:    [address] [place] [address]  [phone]          Follow up with Cecilio Handley DMD Follow up in 1 week(s).    Specialty:  Oral Surgery    Contact information:    [address] " [facility] [address]  [phone]            TEST  RESULTS PENDING AT DISCHARGE   Order Current Status    Heparin-induced Platelet Antibody Collected (06/05/17 1020)    Blood Culture Preliminary result    Blood Culture Preliminary result             Ry Mccann MD  [facility]  06/05/17  10:24 AM      Time: greater than 30 minutes.      Dragon disclaimer:  Much of this encounter note is an electronic transcription/translation of spoken language to printed text. The electronic translation of spoken language may permit erroneous, or at times, nonsensical words or phrases to be inadvertently transcribed; Although I have reviewed the note for such errors, some may still exist.

## 2017-06-05 NOTE — DISCHARGE INSTRUCTIONS
Follow up with Dara Olson in 1 week, call [phone] to schedule an appointment  Follow up with Dr Resendiz in 1 month, can call same number as above  Follow up with Dr Saavedra for your mouth infection, call [phone]  Be sure to have blood work done within 1 week to have your platelets rechecked

## 2017-06-06 LAB — PF4 HEPARIN CMPLX AB SER-ACNC: 0.27 OD (ref 0–0.4)

## 2017-06-07 LAB
BACTERIA SPEC AEROBE CULT: NORMAL
BACTERIA SPEC AEROBE CULT: NORMAL

## 2017-06-12 ENCOUNTER — OFFICE VISIT (OUTPATIENT)
Dept: CARDIOLOGY | Facility: CLINIC | Age: 67
End: 2017-06-12

## 2017-06-12 ENCOUNTER — LAB (OUTPATIENT)
Dept: LAB | Facility: HOSPITAL | Age: 67
End: 2017-06-12

## 2017-06-12 VITALS
HEART RATE: 62 BPM | BODY MASS INDEX: 23.46 KG/M2 | DIASTOLIC BLOOD PRESSURE: 80 MMHG | SYSTOLIC BLOOD PRESSURE: 130 MMHG | HEIGHT: 66 IN | OXYGEN SATURATION: 86 % | WEIGHT: 146 LBS

## 2017-06-12 DIAGNOSIS — E03.9 HYPOTHYROIDISM, UNSPECIFIED TYPE: ICD-10-CM

## 2017-06-12 DIAGNOSIS — I25.10 CORONARY ARTERY DISEASE INVOLVING NATIVE CORONARY ARTERY OF NATIVE HEART WITHOUT ANGINA PECTORIS: ICD-10-CM

## 2017-06-12 DIAGNOSIS — R79.89 ELEVATED SERUM CREATININE: ICD-10-CM

## 2017-06-12 DIAGNOSIS — J44.9 CHRONIC OBSTRUCTIVE PULMONARY DISEASE, UNSPECIFIED COPD TYPE (HCC): ICD-10-CM

## 2017-06-12 DIAGNOSIS — D69.6 THROMBOCYTOPENIA (HCC): ICD-10-CM

## 2017-06-12 DIAGNOSIS — R55 SYNCOPE AND COLLAPSE: Primary | ICD-10-CM

## 2017-06-12 LAB
ALBUMIN SERPL-MCNC: 3.8 G/DL (ref 3.5–5.2)
ALBUMIN/GLOB SERPL: 1.3 G/DL
ALP SERPL-CCNC: 126 U/L (ref 39–117)
ALT SERPL W P-5'-P-CCNC: 31 U/L (ref 1–41)
ANION GAP SERPL CALCULATED.3IONS-SCNC: 9.2 MMOL/L
AST SERPL-CCNC: 27 U/L (ref 1–40)
BASOPHILS # BLD AUTO: 0.01 10*3/MM3 (ref 0–0.2)
BASOPHILS NFR BLD AUTO: 0.1 % (ref 0–1.5)
BILIRUB SERPL-MCNC: 0.7 MG/DL (ref 0.1–1.2)
BUN BLD-MCNC: 16 MG/DL (ref 8–23)
BUN/CREAT SERPL: 18 (ref 7–25)
CALCIUM SPEC-SCNC: 9.3 MG/DL (ref 8.6–10.5)
CHLORIDE SERPL-SCNC: 103 MMOL/L (ref 98–107)
CO2 SERPL-SCNC: 29.8 MMOL/L (ref 22–29)
CREAT BLD-MCNC: 0.89 MG/DL (ref 0.76–1.27)
DEPRECATED RDW RBC AUTO: 47.6 FL (ref 37–54)
EOSINOPHIL # BLD AUTO: 0.26 10*3/MM3 (ref 0–0.7)
EOSINOPHIL NFR BLD AUTO: 3.5 % (ref 0.3–6.2)
ERYTHROCYTE [DISTWIDTH] IN BLOOD BY AUTOMATED COUNT: 13.2 % (ref 11.5–14.5)
GFR SERPL CREATININE-BSD FRML MDRD: 85 ML/MIN/1.73
GLOBULIN UR ELPH-MCNC: 2.9 GM/DL
GLUCOSE BLD-MCNC: 104 MG/DL (ref 65–99)
HCT VFR BLD AUTO: 40.3 % (ref 40.4–52.2)
HGB BLD-MCNC: 13.7 G/DL (ref 13.7–17.6)
IMM GRANULOCYTES # BLD: 0.02 10*3/MM3 (ref 0–0.03)
IMM GRANULOCYTES NFR BLD: 0.3 % (ref 0–0.5)
LYMPHOCYTES # BLD AUTO: 3.12 10*3/MM3 (ref 0.9–4.8)
LYMPHOCYTES NFR BLD AUTO: 41.8 % (ref 19.6–45.3)
MCH RBC QN AUTO: 33.7 PG (ref 27–32.7)
MCHC RBC AUTO-ENTMCNC: 34 G/DL (ref 32.6–36.4)
MCV RBC AUTO: 99 FL (ref 79.8–96.2)
MONOCYTES # BLD AUTO: 0.43 10*3/MM3 (ref 0.2–1.2)
MONOCYTES NFR BLD AUTO: 5.8 % (ref 5–12)
NEUTROPHILS # BLD AUTO: 3.63 10*3/MM3 (ref 1.9–8.1)
NEUTROPHILS NFR BLD AUTO: 48.5 % (ref 42.7–76)
PLATELET # BLD AUTO: 356 10*3/MM3 (ref 140–500)
PMV BLD AUTO: 10.4 FL (ref 6–12)
POTASSIUM BLD-SCNC: 5 MMOL/L (ref 3.5–5.2)
PROT SERPL-MCNC: 6.7 G/DL (ref 6–8.5)
RBC # BLD AUTO: 4.07 10*6/MM3 (ref 4.6–6)
SODIUM BLD-SCNC: 142 MMOL/L (ref 136–145)
WBC NRBC COR # BLD: 7.47 10*3/MM3 (ref 4.5–10.7)

## 2017-06-12 PROCEDURE — 84436 ASSAY OF TOTAL THYROXINE: CPT

## 2017-06-12 PROCEDURE — 80053 COMPREHEN METABOLIC PANEL: CPT

## 2017-06-12 PROCEDURE — 99214 OFFICE O/P EST MOD 30 MIN: CPT | Performed by: PHYSICIAN ASSISTANT

## 2017-06-12 PROCEDURE — 84479 ASSAY OF THYROID (T3 OR T4): CPT

## 2017-06-12 PROCEDURE — 84443 ASSAY THYROID STIM HORMONE: CPT

## 2017-06-12 PROCEDURE — 93000 ELECTROCARDIOGRAM COMPLETE: CPT | Performed by: PHYSICIAN ASSISTANT

## 2017-06-12 PROCEDURE — 36415 COLL VENOUS BLD VENIPUNCTURE: CPT

## 2017-06-12 PROCEDURE — 85025 COMPLETE CBC W/AUTO DIFF WBC: CPT

## 2017-06-13 ENCOUNTER — TELEPHONE (OUTPATIENT)
Dept: CARDIOLOGY | Facility: CLINIC | Age: 67
End: 2017-06-13

## 2017-06-13 LAB
T-UPTAKE NFR SERPL: 0.87 TBI (ref 0.8–1.3)
T4 SERPL-MCNC: 13.98 MCG/DL (ref 4.5–11.7)
TSH SERPL DL<=0.05 MIU/L-ACNC: 0.2 MIU/ML (ref 0.27–4.2)

## 2017-06-13 NOTE — TELEPHONE ENCOUNTER
I called the patient and left a message about his lab results.  His platelet count has normalized, as has his kidney function.  His thyroid is out of control, and his T4 is elevated even higher than when he was in the hospital.  On the message, I asked him to cut his Synthroid dose in half and get in to see his primary care physician.  No further recommendations at this time.

## 2017-06-23 PROCEDURE — 0298T ZIO PATCH: CPT | Performed by: INTERNAL MEDICINE

## 2017-07-07 ENCOUNTER — OFFICE VISIT (OUTPATIENT)
Dept: CARDIOLOGY | Facility: CLINIC | Age: 67
End: 2017-07-07

## 2017-07-07 VITALS
WEIGHT: 141 LBS | BODY MASS INDEX: 22.13 KG/M2 | DIASTOLIC BLOOD PRESSURE: 74 MMHG | SYSTOLIC BLOOD PRESSURE: 130 MMHG | HEIGHT: 67 IN | HEART RATE: 76 BPM

## 2017-07-07 DIAGNOSIS — Z95.5 S/P CORONARY ARTERY STENT PLACEMENT: Primary | ICD-10-CM

## 2017-07-07 DIAGNOSIS — J44.9 CHRONIC OBSTRUCTIVE PULMONARY DISEASE, UNSPECIFIED COPD TYPE (HCC): ICD-10-CM

## 2017-07-07 DIAGNOSIS — I10 ESSENTIAL HYPERTENSION: ICD-10-CM

## 2017-07-07 PROBLEM — R50.9 FEVER: Status: RESOLVED | Noted: 2017-06-02 | Resolved: 2017-07-07

## 2017-07-07 PROBLEM — K04.7 PERIAPICAL ABSCESS: Status: RESOLVED | Noted: 2017-06-02 | Resolved: 2017-07-07

## 2017-07-07 PROCEDURE — 99213 OFFICE O/P EST LOW 20 MIN: CPT | Performed by: INTERNAL MEDICINE

## 2017-07-07 PROCEDURE — 93000 ELECTROCARDIOGRAM COMPLETE: CPT | Performed by: INTERNAL MEDICINE

## 2017-07-07 RX ORDER — MELOXICAM 7.5 MG/1
7.5 TABLET ORAL
COMMUNITY
Start: 2017-06-15 | End: 2018-06-15

## 2017-07-07 NOTE — PROGRESS NOTES
Date of Office Visit: 2017  Encounter Provider: Sidney Resendiz MD  Place of Service: [facility]  Patient Name: Db Hardin  :1950  [id number]    Chief Complaint   Patient presents with   • Coronary Artery Disease   :     HPI: Db Hardin is a 66 y.o. male  I think he is here for follow-up and doing well he had a periapical abscess in his mouth as well as teeth removed I think that may have played a role in my he had a syncopal episode he's had coronary disease dates no real symptoms from that he has chronic shortness of breath from his COPD    Past Medical History:   Diagnosis Date   • CAD (coronary artery disease)    • COPD (chronic obstructive pulmonary disease)    • Diabetes    • Hyperlipidemia    • Myocardial infarction 2011    tx with PCI   • Sinus bradycardia    • ST elevation myocardial infarction involving left anterior descending (LAD) coronary artery 2016 tx with PCI       Past Surgical History:   Procedure Laterality Date   • CARDIAC CATHETERIZATION      :3.0x15mm Vision pLAD; 3.5x20mm Vision p circ; 3.5x28mm Vision to mRCA   • CORONARY ANGIOPLASTY     • CORONARY STENT PLACEMENT      :3.0x15mm Vision pLAD; 3.5x20mm Vision p circ; 3.5x28mm Vision to mRCA       Social History     Social History   • Marital status:      Spouse name: N/A   • Number of children: N/A   • Years of education: N/A     Occupational History   • Not on file.     Social History Main Topics   • Smoking status: Former Smoker     Quit date: 2011   • Smokeless tobacco: Not on file   • Alcohol use Yes      Comment: beer --occ   • Drug use: No   • Sexual activity: Defer     Other Topics Concern   • Not on file     Social History Narrative       Family History   Problem Relation Age of Onset   • No Known Problems Mother    • Cancer Father    • No Known Problems Sister    • No Known Problems Brother    • No Known Problems Maternal Grandmother   "  • No Known Problems Maternal Grandfather    • No Known Problems Paternal Grandmother    • No Known Problems Paternal Grandfather    • No Known Problems Brother    • No Known Problems Sister    • Brain cancer Brother        Review of Systems   Constitution: Negative for decreased appetite, fever, malaise/fatigue and weight loss.   HENT: Negative for nosebleeds.    Eyes: Negative for double vision.   Cardiovascular: Negative for chest pain, claudication, cyanosis, dyspnea on exertion, irregular heartbeat, leg swelling, near-syncope, orthopnea, palpitations, paroxysmal nocturnal dyspnea and syncope.   Respiratory: Negative for cough, hemoptysis and shortness of breath.    Hematologic/Lymphatic: Negative for bleeding problem.   Skin: Negative for rash.   Musculoskeletal: Negative for falls and myalgias.   Gastrointestinal: Negative for hematochezia, jaundice, melena, nausea and vomiting.   Genitourinary: Negative for hematuria.   Neurological: Negative for dizziness and seizures.   Psychiatric/Behavioral: Negative for altered mental status and memory loss.       No Known Allergies      Current Outpatient Prescriptions:   •  aspirin 81 MG tablet, Take 81 mg by mouth Daily., Disp: , Rfl:   •  levothyroxine (SYNTHROID, LEVOTHROID) 50 MCG tablet, Take 2 tablets by mouth Daily., Disp: 30 tablet, Rfl: 1  •  meloxicam (MOBIC) 7.5 MG tablet, Take 7.5 mg by mouth., Disp: , Rfl:   •  pravastatin (PRAVACHOL) 40 MG tablet, Take 40 mg by mouth Daily., Disp: , Rfl:   •  albuterol (PROVENTIL HFA;VENTOLIN HFA) 108 (90 BASE) MCG/ACT inhaler, Inhale 2 puffs Every 4 (Four) Hours As Needed for wheezing., Disp: 6.7 g, Rfl: 11     Objective:     Vitals:    07/07/17 1506   BP: 130/74   BP Location: Right arm   Pulse: 76   Weight: 141 lb (64 kg)   Height: 66.5\" (168.9 cm)     Body mass index is 22.42 kg/(m^2).    Physical Exam   Constitutional: He is oriented to person, place, and time. He appears well-developed and well-nourished.   HENT: "   Head: Normocephalic.   Eyes: No scleral icterus.   Neck: No JVD present. No thyromegaly present.   Cardiovascular: Normal rate, regular rhythm and normal heart sounds.  Exam reveals no gallop and no friction rub.    No murmur heard.  Pulmonary/Chest: Effort normal and breath sounds normal. He has no wheezes. He has no rales.   Abdominal: Soft. There is no hepatosplenomegaly. There is no tenderness.   Musculoskeletal: Normal range of motion. He exhibits no edema.   Lymphadenopathy:     He has no cervical adenopathy.   Neurological: He is alert and oriented to person, place, and time.   Skin: Skin is warm and dry. No rash noted.   Psychiatric: He has a normal mood and affect.         ECG 12 Lead  Date/Time: 7/7/2017 3:46 PM  Performed by: FIAZA RESENDIZ  Authorized by: FAIZA RESENDIZ   Comparison: compared with previous ECG   Rhythm: sinus rhythm  Clinical impression: normal ECG             Assessment:       Diagnosis Plan   1. S/P coronary artery stent placement     2. Essential hypertension     3. Chronic obstructive pulmonary disease, unspecified COPD type            Plan:       I think he's doing pretty well right now I don't see any etiology of his syncope and I think it's probably related somehow to his infection he had evidence mouth the his coronaries were okay we stressed him and no real ischemia and have him come back and see me in a year sooner if he has trouble    As always, it has been a pleasure to participate in your patient's care.      Sincerely,       Faiza Resendiz MD

## 2017-09-18 ENCOUNTER — OFFICE VISIT (OUTPATIENT)
Dept: CARDIOLOGY | Facility: CLINIC | Age: 67
End: 2017-09-18

## 2017-09-18 VITALS
SYSTOLIC BLOOD PRESSURE: 140 MMHG | DIASTOLIC BLOOD PRESSURE: 82 MMHG | HEIGHT: 67 IN | WEIGHT: 144.2 LBS | HEART RATE: 64 BPM | BODY MASS INDEX: 22.63 KG/M2

## 2017-09-18 DIAGNOSIS — Z95.5 S/P CORONARY ARTERY STENT PLACEMENT: Primary | ICD-10-CM

## 2017-09-18 DIAGNOSIS — J44.9 CHRONIC OBSTRUCTIVE PULMONARY DISEASE, UNSPECIFIED COPD TYPE (HCC): ICD-10-CM

## 2017-09-18 PROCEDURE — 99213 OFFICE O/P EST LOW 20 MIN: CPT | Performed by: INTERNAL MEDICINE

## 2017-09-18 PROCEDURE — 93000 ELECTROCARDIOGRAM COMPLETE: CPT | Performed by: INTERNAL MEDICINE

## 2017-09-18 NOTE — PROGRESS NOTES
Date of Office Visit: 2017  Encounter Provider: Sidney Resendiz MD  Place of Service: [facility]  Patient Name: Db Hardin  :1950  [id number]    Chief Complaint   Patient presents with   • Coronary Artery Disease   :     HPI: Db Hardin is a 67 y.o. male  here for preop eval of the knee replacement he's a gentleman that had no infarct back in  at that time we ended up placing bare metal stents in all 3 of his coronaries they're all pretty large and not excessively long he done very well since then former smoker he also had terrible dentition but he's had all his teeth removed he has COPD diabetes and hyperlipidemia has been doing well he's not had any chest pain his shortness of breath is stable no PND orthopnea and edema and no arrhythmia and no heart failure symptoms    Past Medical History:   Diagnosis Date   • CAD (coronary artery disease)    • COPD (chronic obstructive pulmonary disease)    • Diabetes    • Hyperlipidemia    • Myocardial infarction 2011    tx with PCI   • Sinus bradycardia    • ST elevation myocardial infarction involving left anterior descending (LAD) coronary artery 2016 tx with PCI       Past Surgical History:   Procedure Laterality Date   • CARDIAC CATHETERIZATION      :3.0x15mm Vision pLAD; 3.5x20mm Vision p circ; 3.5x28mm Vision to mRCA   • CORONARY ANGIOPLASTY     • CORONARY STENT PLACEMENT      :3.0x15mm Vision pLAD; 3.5x20mm Vision p circ; 3.5x28mm Vision to mRCA       Social History     Social History   • Marital status:      Spouse name: N/A   • Number of children: N/A   • Years of education: N/A     Occupational History   • Not on file.     Social History Main Topics   • Smoking status: Former Smoker     Quit date: 2011   • Smokeless tobacco: Not on file   • Alcohol use Yes      Comment: beer --occ   • Drug use: No   • Sexual activity: Defer     Other Topics Concern   • Not on file      Social History Narrative       Family History   Problem Relation Age of Onset   • No Known Problems Mother    • Cancer Father    • No Known Problems Sister    • No Known Problems Brother    • No Known Problems Maternal Grandmother    • No Known Problems Maternal Grandfather    • No Known Problems Paternal Grandmother    • No Known Problems Paternal Grandfather    • No Known Problems Brother    • No Known Problems Sister    • Brain cancer Brother        Review of Systems   Constitution: Negative for decreased appetite, fever, malaise/fatigue and weight loss.   HENT: Negative for nosebleeds.    Eyes: Negative for double vision.   Cardiovascular: Negative for chest pain, claudication, cyanosis, dyspnea on exertion, irregular heartbeat, leg swelling, near-syncope, orthopnea, palpitations, paroxysmal nocturnal dyspnea and syncope.   Respiratory: Negative for cough, hemoptysis and shortness of breath.    Hematologic/Lymphatic: Negative for bleeding problem.   Skin: Negative for rash.   Musculoskeletal: Negative for falls and myalgias.   Gastrointestinal: Negative for hematochezia, jaundice, melena, nausea and vomiting.   Genitourinary: Negative for hematuria.   Neurological: Negative for dizziness and seizures.   Psychiatric/Behavioral: Negative for altered mental status and memory loss.       No Known Allergies      Current Outpatient Prescriptions:   •  albuterol (PROVENTIL HFA;VENTOLIN HFA) 108 (90 BASE) MCG/ACT inhaler, Inhale 2 puffs Every 4 (Four) Hours As Needed for wheezing., Disp: 6.7 g, Rfl: 11  •  aspirin 81 MG tablet, Take 81 mg by mouth Daily., Disp: , Rfl:   •  levothyroxine (SYNTHROID, LEVOTHROID) 50 MCG tablet, Take 2 tablets by mouth Daily., Disp: 30 tablet, Rfl: 1  •  meloxicam (MOBIC) 7.5 MG tablet, Take 7.5 mg by mouth., Disp: , Rfl:   •  pravastatin (PRAVACHOL) 40 MG tablet, Take 40 mg by mouth Daily., Disp: , Rfl:   •  rivaroxaban (XARELTO) 10 MG tablet, Take 10 mg by mouth Daily., Disp: , Rfl:  "  •  Umeclidinium-Vilanterol (ANORO ELLIPTA) 62.5-25 MCG/INH aerosol powder , Inhale., Disp: , Rfl:      Objective:     Vitals:    09/18/17 1333   BP: 140/82   Pulse: 64   Weight: 144 lb 3.2 oz (65.4 kg)   Height: 66.5\" (168.9 cm)     Body mass index is 22.93 kg/(m^2).    Physical Exam   Constitutional: He is oriented to person, place, and time. He appears well-developed and well-nourished.   HENT:   Head: Normocephalic.   Eyes: No scleral icterus.   Neck: No JVD present. No thyromegaly present.   Cardiovascular: Normal rate, regular rhythm and normal heart sounds.  Exam reveals no gallop and no friction rub.    No murmur heard.  Pulmonary/Chest: Effort normal and breath sounds normal. He has no wheezes. He has no rales.   Abdominal: Soft. There is no hepatosplenomegaly. There is no tenderness.   Musculoskeletal: Normal range of motion. He exhibits no edema.   Lymphadenopathy:     He has no cervical adenopathy.   Neurological: He is alert and oriented to person, place, and time.   Skin: Skin is warm and dry. No rash noted.   Psychiatric: He has a normal mood and affect.         ECG 12 Lead  Date/Time: 9/18/2017 2:05 PM  Performed by: FAIZA RESENDIZ  Authorized by: FAIZA RESENDIZ   Comparison: compared with previous ECG   Similar to previous ECG  Rhythm: sinus rhythm  Clinical impression: normal ECG             Assessment:       Diagnosis Plan   1. S/P coronary artery stent placement     2. Chronic obstructive pulmonary disease, unspecified COPD type            Plan:       I think that he is doing pretty well he seems to be stable and he is at an increased but acceptable risk for his proposed surgery is really nothing to try to change he's been asymptomatic he does have him come back and see me in a year    As always, it has been a pleasure to participate in your patient's care.      Sincerely,       Faiza Resendiz MD                  "

## 2017-09-21 ENCOUNTER — TELEPHONE (OUTPATIENT)
Dept: CARDIOLOGY | Facility: CLINIC | Age: 67
End: 2017-09-21

## 2017-09-21 NOTE — TELEPHONE ENCOUNTER
Patient calling needing to have Knee replacement   Dr. Castillo would like for him to be off his asp prior to his surg. Please advise

## 2018-08-16 ENCOUNTER — OFFICE VISIT (OUTPATIENT)
Dept: CARDIOLOGY | Facility: CLINIC | Age: 68
End: 2018-08-16

## 2018-08-16 VITALS
HEART RATE: 76 BPM | SYSTOLIC BLOOD PRESSURE: 140 MMHG | OXYGEN SATURATION: 98 % | DIASTOLIC BLOOD PRESSURE: 78 MMHG | HEIGHT: 66 IN | BODY MASS INDEX: 23.63 KG/M2 | WEIGHT: 147 LBS

## 2018-08-16 DIAGNOSIS — I25.10 CORONARY ARTERY DISEASE INVOLVING NATIVE CORONARY ARTERY OF NATIVE HEART WITHOUT ANGINA PECTORIS: Primary | ICD-10-CM

## 2018-08-16 DIAGNOSIS — Z95.5 S/P CORONARY ARTERY STENT PLACEMENT: ICD-10-CM

## 2018-08-16 PROBLEM — Z96.652 STATUS POST LEFT KNEE REPLACEMENT: Status: ACTIVE | Noted: 2017-10-16

## 2018-08-16 PROBLEM — M25.562 LEFT KNEE PAIN: Status: ACTIVE | Noted: 2017-09-07

## 2018-08-16 PROCEDURE — 99213 OFFICE O/P EST LOW 20 MIN: CPT | Performed by: PHYSICIAN ASSISTANT

## 2018-08-16 PROCEDURE — 93000 ELECTROCARDIOGRAM COMPLETE: CPT | Performed by: PHYSICIAN ASSISTANT

## 2018-09-17 ENCOUNTER — TELEPHONE (OUTPATIENT)
Dept: CARDIOLOGY | Facility: CLINIC | Age: 68
End: 2018-09-17

## 2018-09-17 NOTE — TELEPHONE ENCOUNTER
Chio from Dr Harshad Ram's office called and would like to get a cardiac clearance for the patient to be off of his aspirin for a shockwave lithotripsy this Friday. They would like to have a letter faxed over to their office. The fax is [phone] I will be happy to fax it over once you have written the stella letter.      Thanks   Rodney

## 2018-09-17 NOTE — TELEPHONE ENCOUNTER
He is admitted acceptable risk to have this done and it's okay to stop the aspirin for a week prior

## 2018-12-06 NOTE — PROGRESS NOTES
Date of Office Visit: 2018  Encounter Provider: DAMARI Jeffers  Place of Service: [facility] CARDIOLOGY  Patient Name: Db Hardin  :1950    Chief Complaint   Patient presents with   • Coronary Artery Disease     1 year follow up   :     HPI: Db Hardin is a 68 y.o. male who presents today for follow-up.  Old records have been obtained and reviewed by me.  He is a patient of Dr. Resendiz's with a past cardiac history significant for coronary artery disease.  He had bare metal stents placed in all 3 of his coronary arteries back in .  He has done well since then.  His last nuclear stress test was in May 2017, this was normal.  In 2017 he had bilateral carotid artery Dopplers which showed mild stenosis on the right and the left internal carotid artery.  An echocardiogram in 2017 was essentially normal with the exception of positive saline test results.  A Holter monitor also in 2017 showed basically PACs and PVCs and no evidence of atrial fibrillation or other dysrhythmias.  He was last in our office to see Dr. Resendiz on 2017.  At that visit he was doing well without complaints of angina or heart failure.  No changes were made to his medical regimen, and he's here today for yearly visit.   Over the past year he's been doing well.  He denies any chest pain, worsening shortness of breath, palpitations, edema, dizziness, or syncope.  He gets plenty of exercise.  He is currently helping his son-in-law set up a huge haunted house at [facility].  This is keeping him pretty busy and active.  He is able to do all this without difficulty.  He does complain of some swelling on his ankles, usually has left greater than his right.  Today there is no swelling.      Past Medical History:   Diagnosis Date   • CAD (coronary artery disease)    • COPD (chronic obstructive pulmonary disease) (CMS/[facility])    • Diabetes (CMS/[facility])    • Hyperlipidemia   Progress Notes by Gloria Blue Sandy,  countries (i.e., China or [place]), [place]:[IT1.1T] Anterior [place] - Myocardial infarction 03/2011    tx with PCI   • Sinus bradycardia    • ST elevation myocardial infarction involving left anterior descending (LAD) coronary artery (CMS/[facility]) 5/6/2016 2011 tx with PCI       Past Surgical History:   Procedure Laterality Date   • CARDIAC CATHETERIZATION      2011:3.0x15mm Vision pLAD; 3.5x20mm Vision p circ; 3.5x28mm Vision to mRCA   • CORONARY ANGIOPLASTY     • CORONARY STENT PLACEMENT      2011:3.0x15mm Vision pLAD; 3.5x20mm Vision p circ; 3.5x28mm Vision to mRCA       Social History     Social History   • Marital status:      Spouse name: N/A   • Number of children: N/A   • Years of education: N/A     Occupational History   • Not on file.     Social History Main Topics   • Smoking status: Former Smoker     Quit date: 7/7/2011   • Smokeless tobacco: Never Used   • Alcohol use 0.6 oz/week     1 Cans of beer per week      Comment: beer --occ   • Drug use: No   • Sexual activity: Defer     Other Topics Concern   • Not on file     Social History Narrative   • No narrative on file       Family History   Problem Relation Age of Onset   • No Known Problems Mother    • Cancer Father    • No Known Problems Sister    • No Known Problems Brother    • No Known Problems Maternal Grandmother    • No Known Problems Maternal Grandfather    • No Known Problems Paternal Grandmother    • No Known Problems Paternal Grandfather    • No Known Problems Brother    • No Known Problems Sister    • Brain cancer Brother        Review of Systems   Constitution: Negative for chills, fever and malaise/fatigue.   Cardiovascular: Positive for leg swelling. Negative for chest pain, dyspnea on exertion, near-syncope, orthopnea, palpitations, paroxysmal nocturnal dyspnea and syncope.   Respiratory: Positive for shortness of breath. Negative for cough.    Musculoskeletal: Negative for joint pain, joint swelling and myalgias.   Gastrointestinal: Negative for abdominal pain, diarrhea, melena, nausea and  "vomiting.   Genitourinary: Negative for frequency and hematuria.   Neurological: Negative for light-headedness, numbness, paresthesias and seizures.   Allergic/Immunologic: Negative.    All other systems reviewed and are negative.      No Known Allergies      Current Outpatient Prescriptions:   •  albuterol (PROVENTIL HFA;VENTOLIN HFA) 108 (90 BASE) MCG/ACT inhaler, Inhale 2 puffs Every 4 (Four) Hours As Needed for wheezing., Disp: 6.7 g, Rfl: 11  •  aspirin 81 MG tablet, Take 81 mg by mouth Daily., Disp: , Rfl:   •  levothyroxine (SYNTHROID, LEVOTHROID) 50 MCG tablet, Take 2 tablets by mouth Daily., Disp: 30 tablet, Rfl: 1  •  pravastatin (PRAVACHOL) 40 MG tablet, Take 40 mg by mouth Daily., Disp: , Rfl:   •  rivaroxaban (XARELTO) 10 MG tablet, Take 10 mg by mouth Daily., Disp: , Rfl:   •  Umeclidinium-Vilanterol (ANORO ELLIPTA) 62.5-25 MCG/INH aerosol powder , Inhale 1 puff Daily., Disp: , Rfl:       Objective:     Vitals:    08/16/18 1354 08/16/18 1405   BP: 126/80 140/78   BP Location: Right arm Left arm   Pulse: 76    SpO2: 98%    Weight: 66.7 kg (147 lb)    Height: 167.6 cm (66\")      Body mass index is 23.73 kg/m².    PHYSICAL EXAM:    Physical Exam   Constitutional: He is oriented to person, place, and time. He appears well-developed and well-nourished. No distress.   HENT:   Head: Normocephalic and atraumatic.   Eyes: Pupils are equal, round, and reactive to light.   Neck: No JVD present. No thyromegaly present.   Cardiovascular: Normal rate, regular rhythm, normal heart sounds and intact distal pulses.    No murmur heard.  Pulmonary/Chest: Effort normal and breath sounds normal. No respiratory distress.   Abdominal: Soft. Bowel sounds are normal. He exhibits no distension. There is no splenomegaly or hepatomegaly. There is no tenderness.   Musculoskeletal: Normal range of motion. He exhibits no edema.   Neurological: He is alert and oriented to person, place, and time.   Skin: Skin is warm and dry. He is " Laisha Groves CNA Certified Nursing Assistant Sign at close encounter     IT1.1 01/18/18 10:08 AM Laisha Groves CNA Certified Nursing Assistant     M - Manual, T - Template             not diaphoretic. No erythema.   Psychiatric: He has a normal mood and affect. His behavior is normal. Judgment normal.         ECG 12 Lead  Date/Time: 8/16/2018 2:15 PM  Performed by: TOLU ORTIZ  Authorized by: TOLU ORTIZ   Comparison: compared with previous ECG from 9/18/2017  Similar to previous ECG  Rhythm: sinus rhythm  BPM: 64  Clinical impression: normal ECG  Comments: Indication: Coronary disease.              Assessment:       Diagnosis Plan   1. Coronary artery disease involving native coronary artery of native heart without angina pectoris  ECG 12 Lead   2. S/P coronary artery stent placement  ECG 12 Lead     Orders Placed This Encounter   Procedures   • ECG 12 Lead     This order was created via procedure documentation          Plan:       1.  Coronary Artery Disease  Assessment  • The patient has no angina    Plan  • Lifestyle modifications discussed include regular exercise    Subjective - Objective  • There has been a previous stent procedure using BMS  • Current antiplatelet therapy includes aspirin 81 mg  • Overall he's doing well.  He is getting regular exercise without difficulty.  He has chronic shortness of breath from his COPD, this is unchanged.  I'm not going to make any changes to his medical regimen today, and he will follow-up with Dr. Resendiz in 1 year or sooner if needed.      As always, it has been a pleasure to participate in your patient's care.      Sincerely,         Tolu Ortiz PA-C

## 2019-08-21 ENCOUNTER — OFFICE VISIT (OUTPATIENT)
Dept: CARDIOLOGY | Facility: CLINIC | Age: 69
End: 2019-08-21

## 2019-08-21 VITALS
HEIGHT: 66 IN | DIASTOLIC BLOOD PRESSURE: 80 MMHG | WEIGHT: 152 LBS | HEART RATE: 61 BPM | BODY MASS INDEX: 24.43 KG/M2 | SYSTOLIC BLOOD PRESSURE: 142 MMHG

## 2019-08-21 DIAGNOSIS — Z95.5 S/P CORONARY ARTERY STENT PLACEMENT: ICD-10-CM

## 2019-08-21 DIAGNOSIS — I25.10 CORONARY ARTERY DISEASE INVOLVING NATIVE CORONARY ARTERY OF NATIVE HEART WITHOUT ANGINA PECTORIS: ICD-10-CM

## 2019-08-21 DIAGNOSIS — I10 ESSENTIAL HYPERTENSION: Primary | ICD-10-CM

## 2019-08-21 PROCEDURE — 99214 OFFICE O/P EST MOD 30 MIN: CPT | Performed by: INTERNAL MEDICINE

## 2019-08-21 RX ORDER — TRAZODONE HYDROCHLORIDE 50 MG/1
50 TABLET ORAL DAILY
COMMUNITY
Start: 2019-05-20

## 2019-08-21 NOTE — PROGRESS NOTES
Date of Office Visit: 19  Encounter Provider: Sidney Resendiz MD  Place of Service: [facility] CARDIOLOGY  Patient Name: Db Hardin  :1950  [id number]    Chief Complaint   Patient presents with   • Coronary Artery Disease   :     HPI: Db Hardin is a 69 y.o. male he is here for follow-up he had bare-metal stents implanted in  is done very well since then no recurrent symptoms he stop smoking he does not have hypertension or hyperlipidemia in general is been doing well without limitation    Past Medical History:   Diagnosis Date   • CAD (coronary artery disease)    • COPD (chronic obstructive pulmonary disease) (CMS/[facility])    • Diabetes (CMS/[facility])    • Hyperlipidemia    • Myocardial infarction (CMS/[facility]) 2011    tx with PCI   • Sinus bradycardia    • ST elevation myocardial infarction involving left anterior descending (LAD) coronary artery (CMS/[facility]) 2016 tx with PCI       Past Surgical History:   Procedure Laterality Date   • CARDIAC CATHETERIZATION      :3.0x15mm Vision pLAD; 3.5x20mm Vision p circ; 3.5x28mm Vision to mRCA   • CORONARY ANGIOPLASTY     • CORONARY STENT PLACEMENT      :3.0x15mm Vision pLAD; 3.5x20mm Vision p circ; 3.5x28mm Vision to mRCA       Social History     Socioeconomic History   • Marital status:      Spouse name: Not on file   • Number of children: Not on file   • Years of education: Not on file   • Highest education level: Not on file   Tobacco Use   • Smoking status: Former Smoker     Last attempt to quit: 2011     Years since quittin.1   • Smokeless tobacco: Never Used   Substance and Sexual Activity   • Alcohol use: Yes     Alcohol/week: 0.6 oz     Types: 1 Cans of beer per week     Comment: beer --occ   • Drug use: No   • Sexual activity: Defer       Family History   Problem Relation Age of Onset   • No Known Problems Mother    • Cancer Father    • No Known Problems Sister    • No Known Problems  Brother    • No Known Problems Maternal Grandmother    • No Known Problems Maternal Grandfather    • No Known Problems Paternal Grandmother    • No Known Problems Paternal Grandfather    • No Known Problems Brother    • No Known Problems Sister    • Brain cancer Brother        Review of Systems   Constitution: Negative for decreased appetite, fever, malaise/fatigue and weight loss.   HENT: Negative for nosebleeds.    Eyes: Negative for double vision.   Cardiovascular: Negative for chest pain, claudication, cyanosis, dyspnea on exertion, irregular heartbeat, leg swelling, near-syncope, orthopnea, palpitations, paroxysmal nocturnal dyspnea and syncope.   Respiratory: Negative for cough, hemoptysis and shortness of breath.    Hematologic/Lymphatic: Negative for bleeding problem.   Skin: Negative for rash.   Musculoskeletal: Negative for falls and myalgias.   Gastrointestinal: Negative for hematochezia, jaundice, melena, nausea and vomiting.   Genitourinary: Negative for hematuria.   Neurological: Negative for dizziness and seizures.   Psychiatric/Behavioral: Negative for altered mental status and memory loss.       No Known Allergies      Current Outpatient Medications:   •  traZODone (DESYREL) 50 MG tablet, Take 50 mg by mouth Daily., Disp: , Rfl:   •  albuterol (PROVENTIL HFA;VENTOLIN HFA) 108 (90 BASE) MCG/ACT inhaler, Inhale 2 puffs Every 4 (Four) Hours As Needed for wheezing., Disp: 6.7 g, Rfl: 11  •  aspirin 81 MG tablet, Take 81 mg by mouth Daily., Disp: , Rfl:   •  levothyroxine (SYNTHROID, LEVOTHROID) 50 MCG tablet, Take 2 tablets by mouth Daily., Disp: 30 tablet, Rfl: 1  •  pravastatin (PRAVACHOL) 40 MG tablet, Take 40 mg by mouth Daily., Disp: , Rfl:   •  rivaroxaban (XARELTO) 10 MG tablet, Take 10 mg by mouth Daily., Disp: , Rfl:   •  Umeclidinium-Vilanterol (ANORO ELLIPTA) 62.5-25 MCG/INH aerosol powder , Inhale 1 puff Daily., Disp: , Rfl:      Objective:     Vitals:    08/21/19 1335   BP: 142/80   BP  "Location: Left arm   Patient Position: Sitting   Pulse: 61   Weight: 68.9 kg (152 lb)   Height: 167.6 cm (66\")     Body mass index is 24.53 kg/m².    Physical Exam   Constitutional: He is oriented to person, place, and time. He appears well-developed and well-nourished.   HENT:   Head: Normocephalic.   Eyes: No scleral icterus.   Neck: No JVD present. No thyromegaly present.   Cardiovascular: Normal rate, regular rhythm and normal heart sounds. Exam reveals no gallop and no friction rub.   No murmur heard.  Pulmonary/Chest: Effort normal and breath sounds normal. He has no wheezes. He has no rales.   Abdominal: Soft. There is no hepatosplenomegaly. There is no tenderness.   Musculoskeletal: Normal range of motion. He exhibits no edema.   Lymphadenopathy:     He has no cervical adenopathy.   Neurological: He is alert and oriented to person, place, and time.   Skin: Skin is warm and dry. No rash noted.   Psychiatric: He has a normal mood and affect.       Procedures     Assessment:       Diagnosis Plan   1. Essential hypertension     2. Coronary artery disease involving native coronary artery of native heart without angina pectoris     3. S/P coronary artery stent placement            Plan:       He is doing well without limitation of Dr. to make any changes and can have him stay on his same medications come back and see us in a year he will see me in 2 years    As always, it has been a pleasure to participate in your patient's care.      Sincerely,       Sidney Resendiz MD                  "

## 2020-07-17 ENCOUNTER — APPOINTMENT (OUTPATIENT)
Dept: MRI IMAGING | Facility: HOSPITAL | Age: 70
End: 2020-07-17

## 2020-07-17 ENCOUNTER — HOSPITAL ENCOUNTER (OUTPATIENT)
Facility: HOSPITAL | Age: 70
Discharge: HOME | End: 2020-07-17
Attending: HOSPITALIST | Admitting: HOSPITALIST

## 2020-07-17 VITALS
DIASTOLIC BLOOD PRESSURE: 87 MMHG | WEIGHT: 153.66 LBS | HEART RATE: 62 BPM | BODY MASS INDEX: 24.7 KG/M2 | RESPIRATION RATE: 16 BRPM | OXYGEN SATURATION: 94 % | TEMPERATURE: 97.9 F | HEIGHT: 66 IN | SYSTOLIC BLOOD PRESSURE: 151 MMHG

## 2020-07-17 DIAGNOSIS — R42 VERTIGO: Primary | ICD-10-CM

## 2020-07-17 PROBLEM — E86.0 DEHYDRATION: Status: ACTIVE | Noted: 2020-07-17

## 2020-07-17 LAB
ALBUMIN SERPL-MCNC: 3.7 G/DL (ref 3.5–5.2)
ALBUMIN/GLOB SERPL: 1.8 G/DL
ALP SERPL-CCNC: 82 U/L (ref 39–117)
ALT SERPL W P-5'-P-CCNC: 10 U/L (ref 1–41)
ANION GAP SERPL CALCULATED.3IONS-SCNC: 8.3 MMOL/L (ref 5–15)
AST SERPL-CCNC: 14 U/L (ref 1–40)
BILIRUB SERPL-MCNC: 1.1 MG/DL (ref 0–1.2)
BUN SERPL-MCNC: 15 MG/DL (ref 8–23)
BUN/CREAT SERPL: 17.6 (ref 7–25)
CALCIUM SPEC-SCNC: 8.8 MG/DL (ref 8.6–10.5)
CHLORIDE SERPL-SCNC: 104 MMOL/L (ref 98–107)
CHOLEST SERPL-MCNC: 115 MG/DL (ref 0–200)
CO2 SERPL-SCNC: 27.7 MMOL/L (ref 22–29)
CREAT SERPL-MCNC: 0.85 MG/DL (ref 0.76–1.27)
DEPRECATED RDW RBC AUTO: 46.6 FL (ref 37–54)
ERYTHROCYTE [DISTWIDTH] IN BLOOD BY AUTOMATED COUNT: 13.4 % (ref 12.3–15.4)
GFR SERPL CREATININE-BSD FRML MDRD: 89 ML/MIN/1.73
GLOBULIN UR ELPH-MCNC: 2.1 GM/DL
GLUCOSE BLDC GLUCOMTR-MCNC: 93 MG/DL (ref 70–130)
GLUCOSE SERPL-MCNC: 78 MG/DL (ref 65–99)
HBA1C MFR BLD: 5.6 % (ref 4.8–5.6)
HCT VFR BLD AUTO: 39.8 % (ref 37.5–51)
HDLC SERPL-MCNC: 44 MG/DL (ref 40–60)
HGB BLD-MCNC: 13.9 G/DL (ref 13–17.7)
LDLC SERPL CALC-MCNC: 60 MG/DL (ref 0–100)
LDLC/HDLC SERPL: 1.35 {RATIO}
MCH RBC QN AUTO: 33.1 PG (ref 26.6–33)
MCHC RBC AUTO-ENTMCNC: 34.9 G/DL (ref 31.5–35.7)
MCV RBC AUTO: 94.8 FL (ref 79–97)
PLATELET # BLD AUTO: 169 10*3/MM3 (ref 140–450)
PMV BLD AUTO: 10.1 FL (ref 6–12)
POTASSIUM SERPL-SCNC: 3.9 MMOL/L (ref 3.5–5.2)
PROT SERPL-MCNC: 5.8 G/DL (ref 6–8.5)
RBC # BLD AUTO: 4.2 10*6/MM3 (ref 4.14–5.8)
SODIUM SERPL-SCNC: 140 MMOL/L (ref 136–145)
TRIGL SERPL-MCNC: 57 MG/DL (ref 0–150)
TSH SERPL DL<=0.05 MIU/L-ACNC: 0.06 UIU/ML (ref 0.27–4.2)
VLDLC SERPL-MCNC: 11.4 MG/DL (ref 5–40)
WBC # BLD AUTO: 5.36 10*3/MM3 (ref 3.4–10.8)

## 2020-07-17 PROCEDURE — 83036 HEMOGLOBIN GLYCOSYLATED A1C: CPT | Performed by: NURSE PRACTITIONER

## 2020-07-17 PROCEDURE — 99203 OFFICE O/P NEW LOW 30 MIN: CPT | Performed by: PSYCHIATRY & NEUROLOGY

## 2020-07-17 PROCEDURE — 70551 MRI BRAIN STEM W/O DYE: CPT

## 2020-07-17 PROCEDURE — 63710000001 ASPIRIN 325 MG TABLET: Performed by: NURSE PRACTITIONER

## 2020-07-17 PROCEDURE — G0378 HOSPITAL OBSERVATION PER HR: HCPCS

## 2020-07-17 PROCEDURE — 96360 HYDRATION IV INFUSION INIT: CPT

## 2020-07-17 PROCEDURE — 80061 LIPID PANEL: CPT | Performed by: NURSE PRACTITIONER

## 2020-07-17 PROCEDURE — 84443 ASSAY THYROID STIM HORMONE: CPT | Performed by: NURSE PRACTITIONER

## 2020-07-17 PROCEDURE — 82962 GLUCOSE BLOOD TEST: CPT

## 2020-07-17 PROCEDURE — A9270 NON-COVERED ITEM OR SERVICE: HCPCS | Performed by: NURSE PRACTITIONER

## 2020-07-17 PROCEDURE — 80053 COMPREHEN METABOLIC PANEL: CPT | Performed by: NURSE PRACTITIONER

## 2020-07-17 PROCEDURE — 85027 COMPLETE CBC AUTOMATED: CPT | Performed by: NURSE PRACTITIONER

## 2020-07-17 PROCEDURE — 97162 PT EVAL MOD COMPLEX 30 MIN: CPT | Performed by: PHYSICAL THERAPIST

## 2020-07-17 PROCEDURE — 96361 HYDRATE IV INFUSION ADD-ON: CPT

## 2020-07-17 RX ORDER — ACETAMINOPHEN 650 MG/1
650 SUPPOSITORY RECTAL EVERY 4 HOURS PRN
Status: DISCONTINUED | OUTPATIENT
Start: 2020-07-17 | End: 2020-07-17 | Stop reason: HOSPADM

## 2020-07-17 RX ORDER — TRAZODONE HYDROCHLORIDE 50 MG/1
50 TABLET ORAL DAILY
Status: DISCONTINUED | OUTPATIENT
Start: 2020-07-17 | End: 2020-07-17 | Stop reason: HOSPADM

## 2020-07-17 RX ORDER — LEVOTHYROXINE SODIUM 0.1 MG/1
100 TABLET ORAL DAILY
Status: DISCONTINUED | OUTPATIENT
Start: 2020-07-18 | End: 2020-07-17 | Stop reason: HOSPADM

## 2020-07-17 RX ORDER — LABETALOL HYDROCHLORIDE 5 MG/ML
10 INJECTION, SOLUTION INTRAVENOUS
Status: DISCONTINUED | OUTPATIENT
Start: 2020-07-17 | End: 2020-07-17 | Stop reason: HOSPADM

## 2020-07-17 RX ORDER — ATORVASTATIN CALCIUM 80 MG/1
80 TABLET, FILM COATED ORAL NIGHTLY
Status: DISCONTINUED | OUTPATIENT
Start: 2020-07-17 | End: 2020-07-17 | Stop reason: HOSPADM

## 2020-07-17 RX ORDER — PRAVASTATIN SODIUM 40 MG
40 TABLET ORAL DAILY
Status: DISCONTINUED | OUTPATIENT
Start: 2020-07-18 | End: 2020-07-17 | Stop reason: HOSPADM

## 2020-07-17 RX ORDER — MECLIZINE HYDROCHLORIDE 25 MG/1
25 TABLET ORAL 3 TIMES DAILY PRN
Qty: 90 TABLET | Refills: 0 | Status: SHIPPED | OUTPATIENT
Start: 2020-07-17 | End: 2020-08-27 | Stop reason: ALTCHOICE

## 2020-07-17 RX ORDER — SODIUM CHLORIDE 9 MG/ML
75 INJECTION, SOLUTION INTRAVENOUS CONTINUOUS
Status: DISCONTINUED | OUTPATIENT
Start: 2020-07-17 | End: 2020-07-17 | Stop reason: HOSPADM

## 2020-07-17 RX ORDER — ALBUTEROL SULFATE 2.5 MG/3ML
2.5 SOLUTION RESPIRATORY (INHALATION) EVERY 6 HOURS PRN
Status: DISCONTINUED | OUTPATIENT
Start: 2020-07-17 | End: 2020-07-17 | Stop reason: HOSPADM

## 2020-07-17 RX ORDER — ACETAMINOPHEN 325 MG/1
650 TABLET ORAL EVERY 4 HOURS PRN
Status: DISCONTINUED | OUTPATIENT
Start: 2020-07-17 | End: 2020-07-17 | Stop reason: HOSPADM

## 2020-07-17 RX ORDER — ASPIRIN 81 MG/1
81 TABLET ORAL DAILY
Status: DISCONTINUED | OUTPATIENT
Start: 2020-07-17 | End: 2020-07-17 | Stop reason: SDUPTHER

## 2020-07-17 RX ORDER — BISACODYL 10 MG
10 SUPPOSITORY, RECTAL RECTAL DAILY PRN
Status: DISCONTINUED | OUTPATIENT
Start: 2020-07-17 | End: 2020-07-17 | Stop reason: HOSPADM

## 2020-07-17 RX ORDER — ONDANSETRON 2 MG/ML
4 INJECTION INTRAMUSCULAR; INTRAVENOUS EVERY 6 HOURS PRN
Status: DISCONTINUED | OUTPATIENT
Start: 2020-07-17 | End: 2020-07-17 | Stop reason: HOSPADM

## 2020-07-17 RX ORDER — MECLIZINE HYDROCHLORIDE 25 MG/1
25 TABLET ORAL 3 TIMES DAILY PRN
Status: DISCONTINUED | OUTPATIENT
Start: 2020-07-17 | End: 2020-07-17 | Stop reason: HOSPADM

## 2020-07-17 RX ORDER — ASPIRIN 300 MG/1
300 SUPPOSITORY RECTAL DAILY
Status: DISCONTINUED | OUTPATIENT
Start: 2020-07-17 | End: 2020-07-17 | Stop reason: HOSPADM

## 2020-07-17 RX ORDER — ASPIRIN 325 MG
325 TABLET ORAL DAILY
Status: DISCONTINUED | OUTPATIENT
Start: 2020-07-17 | End: 2020-07-17 | Stop reason: HOSPADM

## 2020-07-17 RX ORDER — IPRATROPIUM BROMIDE AND ALBUTEROL SULFATE 2.5; .5 MG/3ML; MG/3ML
3 SOLUTION RESPIRATORY (INHALATION)
Status: DISCONTINUED | OUTPATIENT
Start: 2020-07-17 | End: 2020-07-17 | Stop reason: HOSPADM

## 2020-07-17 RX ADMIN — ASPIRIN 325 MG: 325 TABLET ORAL at 08:15

## 2020-07-17 RX ADMIN — SODIUM CHLORIDE 75 ML/HR: 9 INJECTION, SOLUTION INTRAVENOUS at 04:49

## 2020-07-17 NOTE — NURSING NOTE
Acute rehab referral received via stroke order set. MRI noted to be negative for acute infarct and PT and SLP have signed off. Discharge plans noted. Will sign off.     Thank you!    Dioemdes Vega RN  p

## 2020-07-17 NOTE — CONSULTS
Neurology Note    Patient:  Db Hardin    YOB: 1950    REFERRING PHYSICIAN:  Federico Coleman MD    CHIEF COMPLAINT:    dizzsiness    HISTORY OF PRESENT ILLNESS:   The patient is a 69 y.o. male with CAD, HTN, HLP developed recurrent vertigo, N/V on Wednesday that lasted about 2 hours, worse with head turns and getting up, unsteady gait which resolved but reoccurred yday. Denies having a headache, focal numbness, weakness, vision or speech changes. He was seen at [facility] ED where CT head and CTA head and neck were negative, transferred here for MRI which was completed this am and is negative by report and my review. Patient is currently asymptomatic, has been able to eat and walk w/o help. He reports a remote injury to left ear drum, has some chronic hearing loss on the left, denies tinnitus.    Past Medical History:  Past Medical History:   Diagnosis Date   • CAD (coronary artery disease)    • COPD (chronic obstructive pulmonary disease) (CMS/[facility])    • Diabetes (CMS/[facility])    • Disease of thyroid gland    • Elevated cholesterol    • Hyperlipidemia    • Hypertension    • Myocardial infarction (CMS/HCC) 03/2011    tx with PCI   • Sinus bradycardia    • ST elevation myocardial infarction involving left anterior descending (LAD) coronary artery (CMS/[facility]) 5/6/2016 2011 tx with PCI       Past Surgical History:  Past Surgical History:   Procedure Laterality Date   • CARDIAC CATHETERIZATION      2011:3.0x15mm Vision pLAD; 3.5x20mm Vision p circ; 3.5x28mm Vision to mRCA   • CARPAL TUNNEL RELEASE Left    • CORONARY ANGIOPLASTY     • CORONARY STENT PLACEMENT      2011:3.0x15mm Vision pLAD; 3.5x20mm Vision p circ; 3.5x28mm Vision to mRCA   • JOINT REPLACEMENT         Social History:   Social History     Socioeconomic History   • Marital status:      Spouse name: Not on file   • Number of children: Not on file   • Years of education: Not on file   • Highest education level: Not on file    Tobacco Use   • Smoking status: Former Smoker     Last attempt to quit: 2011     Years since quittin.0   • Smokeless tobacco: Never Used   Substance and Sexual Activity   • Alcohol use: Yes     Alcohol/week: 1.0 standard drinks     Types: 1 Cans of beer per week     Comment: beer --occ   • Drug use: No   • Sexual activity: Defer        Family History:   Family History   Problem Relation Age of Onset   • No Known Problems Mother    • Cancer Father    • No Known Problems Sister    • No Known Problems Brother    • No Known Problems Maternal Grandmother    • No Known Problems Maternal Grandfather    • No Known Problems Paternal Grandmother    • No Known Problems Paternal Grandfather    • No Known Problems Brother    • No Known Problems Sister    • Brain cancer Brother        Medications Prior to Admission:    Prior to Admission medications    Medication Sig Start Date End Date Taking? Authorizing Provider   albuterol (PROVENTIL HFA;VENTOLIN HFA) 108 (90 BASE) MCG/ACT inhaler Inhale 2 puffs Every 4 (Four) Hours As Needed for wheezing. 17  Yes Alessandro Saab MD   aspirin 81 MG tablet Take 81 mg by mouth Daily. 13  Yes Earl Aguilera MD   levothyroxine (SYNTHROID, LEVOTHROID) 50 MCG tablet Take 2 tablets by mouth Daily. 17  Yes Ry Mccann MD   pravastatin (PRAVACHOL) 40 MG tablet Take 40 mg by mouth Daily. 14  Yes Earl Aguilera MD   traZODone (DESYREL) 50 MG tablet Take 50 mg by mouth Daily. 19  Yes Earl Aguilera MD   Umeclidinium-Vilanterol (ANORO ELLIPTA) 62.5-25 MCG/INH aerosol powder  Inhale 1 puff Daily.   Yes ProviderEarl MD   rivaroxaban (XARELTO) 10 MG tablet Take 10 mg by mouth Daily.  20  Earl Aguilera MD       Allergies:  Patient has no known allergies.      Review of system  Review of Systems   Gastrointestinal: Positive for nausea and vomiting.   Neurological: Positive for dizziness.   All other systems reviewed  and are negative.      Vitals:    07/17/20 0909   BP: 151/87   Pulse: 62   Resp: 16   Temp: 97.9 °F (36.6 °C)   SpO2: 94%       Physical exam  Physical Exam   Constitutional: He is oriented to person, place, and time. He appears well-developed and well-nourished.   HENT:   Head: Normocephalic and atraumatic.   Eyes: Pupils are equal, round, and reactive to light.   Neck: Carotid bruit is not present.   Cardiovascular: Normal rate and regular rhythm.   Pulmonary/Chest: Effort normal.   Neurological: He is alert and oriented to person, place, and time. He has normal strength and normal reflexes. No cranial nerve deficit or sensory deficit. He displays no Babinski's sign on the right side. He displays no Babinski's sign on the left side.   Speech clear, VFF, no facial droop, no limb drift.   Psychiatric: He has a normal mood and affect. His behavior is normal. Thought content normal.         Lab Results   Component Value Date    WBC 5.36 07/17/2020    HGB 13.9 07/17/2020    HCT 39.8 07/17/2020    MCV 94.8 07/17/2020     07/17/2020     Lab Results   Component Value Date    GLUCOSE 78 07/17/2020    BUN 15 07/17/2020    CREATININE 0.85 07/17/2020    EGFRIFNONA 89 07/17/2020    BCR 17.6 07/17/2020    CO2 27.7 07/17/2020    CALCIUM 8.8 07/17/2020    ALBUMIN 3.70 07/17/2020    LABIL2 1.5 05/29/2018    AST 14 07/17/2020    ALT 10 07/17/2020     Lipid Panel   Order: [id number]   Status:  Final result   Visible to patient:  No (Not Released) Next appt:  08/21/2020 at 01:30 PM in Cardiology (Linda Mclain, MICHAEL)   Specimen Information: Blood        Component  Ref Range & Units 04:57 2yr ago   Total Cholesterol  0 - 200 mg/dL 115  151 R   Triglycerides  0 - 150 mg/dL 57  81 R   HDL Cholesterol  40 - 60 mg/dL 44  45 R   LDL Cholesterol   0 - 100 mg/dL 60  90    VLDL Cholesterol  5 - 40 mg/dL 11.4  16    LDL/HDL Ratio 1.35             Hemoglobin A1C  4.80 - 5.60 % 5.60          Radiological Studies:    Mri Brain Without  Contrast    Result Date: 2020  MRI BRAIN WITHOUT CONTRAST-  2020  HISTORY: Possible stroke. Episodes of room spinning sensation.  TECHNIQUE: Multiple noncontrast axial and sagittal images were obtained through the brain.  FINDINGS: The diffusion-weighted images show no evidence of acute infarction. The FLAIR images show no significant white matter disease.  There is mild diffuse atrophy. No intracranial hemorrhage is seen.  Normal-appearing vascular flow voids are seen. The craniocervical junction and sella appear normal.      No acute intracranial process identified.         Duplex scan of extracranial arteries using B-mode, color flow, and/or spectral Doppler; bilateral   Order# [id number]   Reading physician: Hieu Esteban MD Ordering physician: Timbo Maguire MD Study date: 6/3/17   Patient Information     Patient Name  Db Hardin MRN  [id number] Sex  Male  (Age)  1950 (69 y.o.)   Clinical Indication     other; syncope and collapse   Interpretation Summary     · Proximal right internal carotid artery mild stenosis.  · Proximal left internal carotid artery mild stenosis.      Patient Hx Of Height, Weight, and Vitals     Height Weight BSA (Calculated - sq m) BMI (kg/m2) Pulse BP       78 97/65   Study Findings     • Right CCA Dist: Plaque present.   • Right ICA Prox: Plaque present.   • Right ECA: Plaque present.   • Right Vertebral: Antegrade flow noted.       • Left ICA Prox: Plaque present.   • Left ECA: Plaque present.   • Left Vertebral: Antegrade flow noted.   Study Impression     • Right ICA Prox: Imaging indicates 16%-49% stenosis.       • Left ICA Prox: Imaging indicates 16-49% stenosis.         During this visit the following were done:  Labs Reviewed [x]    Labs Ordered []    Radiology Reports Reviewed [x]    Radiology Ordered [x]    EKG, echo, and/or stress test reviewed [x]    EEG results reviewed  []    EEG reviewed and interpreted per myself   []    Discussed  case with neurointerventionalist or neuroradiologist []    Referring Provider Records Reviewed []    ER Records Reviewed []    Hospital Records Reviewed []    History Obtained From Family []    Radiological images view and Interpreted per myself [x]    Case Discussed with referring provider []     Decision to obtain and request outside records  []        Assessment and Plan     Vertigo resolved, favor BPPV. Multiple vascular risks. Negative MRI brain and CTA head and neck encouraging.   - Continue aspirin and statin.   - Meclizine and vestibular rehab prn recurrent symptoms.   - Outpatient neurology F/U optional.    Thanks,        Electronically signed by Alexis Christian MD on 7/17/2020 at 12:06

## 2020-07-17 NOTE — PROGRESS NOTES
Discharge Planning Assessment  [facility]     Patient Name: Db Hardin  MRN: [id number]  Today's Date: 7/17/2020    Admit Date: 7/17/2020    Discharge Needs Assessment     Row Name 07/17/20 1208       Living Environment    Lives With  spouse    Name(s) of Who Lives With Patient  spouse Carol Hardin [phone]    Current Living Arrangements  home/apartment/condo    Primary Care Provided by  self;spouse/significant other    Provides Primary Care For  no one, unable/limited ability to care for self    Family Caregiver if Needed  spouse    Family Caregiver Names  spouse Carol Hardin [phone]    Quality of Family Relationships  helpful;involved;supportive    Able to Return to Prior Arrangements  yes       Resource/Environmental Concerns    Resource/Environmental Concerns  home accessibility    Home Accessibility Concerns  stairs to enter home 3 steps with no handrails to enter the single story home with a basement that the patient does not have to use.        Transition Planning    Patient/Family Anticipates Transition to  home with family    Patient/Family Anticipated Services at Transition  none    Transportation Anticipated  family or friend will provide       Discharge Needs Assessment    Concerns to be Addressed  denies needs/concerns at this time    Equipment Currently Used at Home  oxygen    Anticipated Changes Related to Illness  none    Equipment Needed After Discharge  none    Current Discharge Risk  physical impairment        Discharge Plan     Row Name 07/17/20 1210       Plan    Plan  Plans home; denies needs.  Follow to see if vestibular rehab will be needed upon d/c.      Provided Post Acute Provider List?  Yes    Post Acute Provider List  Home Health;Nursing Home    Provided Post Acute Provider Quality & Resource List?  N/A    N/A Quality & Resource List Comment  The patient was provided with a HH/SNF list and not a print out of the HH/nursing home compare list as  he currently denies any d/c needs.     Delivered To  Patient    Method of Delivery  In person    Patient/Family in Agreement with Plan  yes    Plan Comments  Met with the patient at bedside; explained role of CCP, verified facesheet, checked [place] notice and discussed discharge planning needs. The patient plans to return home upon d/c with assistance from his wife Carol Hardin [phone].  The patient uses nocturnal home O2 from [employer] and has 3 steps with no handrails to enter his single story home with a basement that he does not have to use. The patient's PCP is MICHAEL Marie, pharmacy is [facility] in [facility] and the patient denies any trouble remembering to take his medication or with affording his medication. The patient denies any HH/SNF history, denies any POA documents, states that he drives himself to his appointments and his wife or daughter will transport him home upon d/c. The patient was provided with a HH/SNF list and not a print out of the HH/nursing home compare list as he currently denies any d/c needs.  P.T. And S.T. Signed off. CCP will follow to see if the patient will need any vestibular rehab upon d/c and will assist with arranging HH vs. outpatient therapy.  REI Damian        Destination      Coordination has not been started for this encounter.      Durable Medical Equipment      Coordination has not been started for this encounter.      Dialysis/Infusion      Coordination has not been started for this encounter.      Home Medical Care      Coordination has not been started for this encounter.      Therapy      Coordination has not been started for this encounter.      Community Resources      Coordination has not been started for this encounter.          Demographic Summary     Row Name 07/17/20 1203       General Information    Admission Type  observation    Arrived From  home    Referral Source  admission list    Reason for Consult  discharge planning     Preferred Language  English     Used During This Interaction  no        Functional Status     Row Name 07/17/20 1208       Functional Status    Usual Activity Tolerance  good    Current Activity Tolerance  moderate       Functional Status, IADL    Medications  independent    Meal Preparation  independent    Housekeeping  independent    Laundry  independent    Shopping  independent       Mental Status    General Appearance WDL  WDL       Mental Status Summary    Recent Changes in Mental Status/Cognitive Functioning  no changes        Psychosocial    No documentation.       Abuse/Neglect    No documentation.       Legal    No documentation.       Substance Abuse    No documentation.       Patient Forms     Row Name 07/17/20 1207       Patient Forms    Provider Choice List  Delivered    Delivered to  Patient    Method of delivery  In person            REI Carpio

## 2020-07-17 NOTE — THERAPY DISCHARGE NOTE
Patient Name: Db Hardin  : 1950    MRN: [id number]                              Today's Date: 2020       Admit Date: 2020    Visit Dx: No diagnosis found.  Patient Active Problem List   Diagnosis   • S/P coronary artery stent placement   • Diabetes mellitus without complication (CMS/[facility])   • Hypertension   • Influenza A   • DESI (acute kidney injury) (CMS/[facility])   • CAD (coronary artery disease)   • Disorder of joint   • Chronic obstructive pulmonary disease (CMS/[facility])   • Hyperlipidemia   • Knee osteoarthritis   • Chest pain   • Syncope and collapse   • Hyperthyroidism   • Status post left knee replacement   • Left knee pain     Past Medical History:   Diagnosis Date   • CAD (coronary artery disease)    • COPD (chronic obstructive pulmonary disease) (CMS/[facility])    • Diabetes (CMS/[facility])    • Disease of thyroid gland    • Elevated cholesterol    • Hyperlipidemia    • Hypertension    • Myocardial infarction (CMS/[facility]) 2011    tx with PCI   • Sinus bradycardia    • ST elevation myocardial infarction involving left anterior descending (LAD) coronary artery (CMS/[facility]) 2016 tx with PCI     Past Surgical History:   Procedure Laterality Date   • CARDIAC CATHETERIZATION      2011:3.0x15mm Vision pLAD; 3.5x20mm Vision p circ; 3.5x28mm Vision to mRCA   • CARPAL TUNNEL RELEASE Left    • CORONARY ANGIOPLASTY     • CORONARY STENT PLACEMENT      :3.0x15mm Vision pLAD; 3.5x20mm Vision p circ; 3.5x28mm Vision to mRCA   • JOINT REPLACEMENT       General Information     Row Name 20 0844          PT Evaluation Time/Intention    Document Type  evaluation;discharge evaluation/summary  -     Mode of Treatment  individual therapy;physical therapy  -     Row Name 20 0844          General Information    Prior Level of Function  independent:  -     Existing Precautions/Restrictions  fall  -     Barriers to Rehab  none identified  -     Row Name 20 0844           Relationship/Environment    Lives With  spouse  -     Row Name 07/17/20 0844          Resource/Environmental Concerns    Current Living Arrangements  home/apartment/condo  -     Row Name 07/17/20 0844          Home Main Entrance    Number of Stairs, Main Entrance  three  -     Row Name 07/17/20 0844          Cognitive Assessment/Intervention- PT/OT    Orientation Status (Cognition)  oriented x 4  -       User Key  (r) = Recorded By, (t) = Taken By, (c) = Cosigned By    Initials Name Provider Type    Shaye Smith, PT Physical Therapist        Mobility     Row Name 07/17/20 0844          Bed Mobility Assessment/Treatment    Bed Mobility Assessment/Treatment  supine-sit;sit-supine  -     Supine-Sit [place] (Bed Mobility)  independent  -     Sit-Supine [place] (Bed Mobility)  independent  -     Row Name 07/17/20 0844          Sit-Stand Transfer    Sit-Stand [place] (Transfers)  supervision  -     Row Name 07/17/20 0844          Gait/Stairs Assessment/Training    [place] Level (Gait)  supervision  -     Distance in Feet (Gait)  250 ft  -     Deviations/Abnormal Patterns (Gait)  gait speed decreased  -     Number of Steps (Stairs)  -- pt denied a need to practice stairs  -       User Key  (r) = Recorded By, (t) = Taken By, (c) = Cosigned By    Initials Name Provider Type    Shaye Smith, PT Physical Therapist        Obj/Interventions     Row Name 07/17/20 0845          General ROM    GENERAL ROM COMMENTS  BLE WFL  -     Row Name 07/17/20 0845          MMT (Manual Muscle Testing)    General MMT Comments  BLE WFL  -     Row Name 07/17/20 0845          Static Sitting Balance    Level of [place] (Unsupported Sitting, Static Balance)  independent  -     Sitting Position (Unsupported Sitting, Static Balance)  sitting on edge of bed  -     Time Able to Maintain Position (Unsupported Sitting, Static Balance)  4 to 5 minutes  -     Row Name  07/17/20 0845          Dynamic Sitting Balance    Level of [place], Reaches Outside Midline (Sitting, Dynamic Balance)  independent  -KH     Row Name 07/17/20 0845          Static Standing Balance    Level of [place] (Supported Standing, Static Balance)  supervision  -       User Key  (r) = Recorded By, (t) = Taken By, (c) = Cosigned By    Initials Name Provider Type    Shaye Smith, PT Physical Therapist        Goals/Plan    No documentation.       Clinical Impression     Row Name 07/17/20 0846          Pain Assessment    Additional Documentation  Pain Scale: Numbers Pre/Post-Treatment (Group)  -KH     Row Name 07/17/20 0846          Pain Scale: Numbers Pre/Post-Treatment    Pain Scale: Numbers, Pretreatment  0/10 - no pain  -     Pain Scale: Numbers, Post-Treatment  0/10 - no pain  -KH     Row Name 07/17/20 0846          Plan of Care Review    Plan of Care Reviewed With  patient  -     Outcome Summary  Pt admitted with vertigo over the past 2 days. MRi is complete but pending results. Pt reports symptoms have resolved and he is mobilizing at baseline level. Pt ambualted well with no assistance needed, no LOB and no dizziniess noted. Pt appears to be functionoing at baseline level and is safe to d/c home when medically stable. PT will sign off.   -KH     Row Name 07/17/20 0846          Physical Therapy Clinical Impression    Patient/Family Goals Statement (PT Clinical Impression)  return home  -     Criteria for Skilled Interventions Met (PT Clinical Impression)  no problems identified which require skilled intervention  -KH     Row Name 07/17/20 0846          Positioning and Restraints    Pre-Treatment Position  in bed  -     Post Treatment Position  bed  -     In Bed  fowlers;call light within reach;encouraged to call for assist;exit alarm on  -       User Key  (r) = Recorded By, (t) = Taken By, (c) = Cosigned By    Initials Name Provider Type    Shaye Smith,  PT Physical Therapist        Outcome Measures     Row Name 07/17/20 0848          How much help from another person do you currently need...    Turning from your back to your side while in flat bed without using bedrails?  4  -KH     Moving from lying on back to sitting on the side of a flat bed without bedrails?  4  -KH     Moving to and from a bed to a chair (including a wheelchair)?  4  -KH     Standing up from a chair using your arms (e.g., wheelchair, bedside chair)?  4  -KH     Climbing 3-5 steps with a railing?  3  -KH     To walk in hospital room?  4  -KH     AM-PAC 6 Clicks Score (PT)  23  -KH     Row Name 07/17/20 0848          Functional Assessment    Outcome Measure Options  AM-PAC 6 Clicks Basic Mobility (PT)  -       User Key  (r) = Recorded By, (t) = Taken By, (c) = Cosigned By    Initials Name Provider Type    Shaye Smith PT Physical Therapist          PT Recommendation and Plan     Outcome Summary/Treatment Plan (PT)  Anticipated Discharge Disposition (PT): home  Plan of Care Reviewed With: patient  Outcome Summary: Pt admitted with vertigo over the past 2 days. MRi is complete but pending results. Pt reports symptoms have resolved and he is mobilizing at baseline level. Pt ambualted well with no assistance needed, no LOB and no dizziniess noted. Pt appears to be functionoing at baseline level and is safe to d/c home when medically stable. PT will sign off.      Time Calculation:   PT Charges     Row Name 07/17/20 0849             Time Calculation    Start Time  0830  -      Stop Time  0845  -      Time Calculation (min)  15 min  -        User Key  (r) = Recorded By, (t) = Taken By, (c) = Cosigned By    Initials Name Provider Type    Shaye Smith PT Physical Therapist        Therapy Charges for Today     Code Description Service Date Service Provider Modifiers Qty    [id number]  PT EVAL MOD COMPLEXITY 2 7/17/2020 Shaye Levin, PT GP 1          PT  G-Codes  Outcome Measure Options: AM-PAC 6 Clicks Basic Mobility (PT)  AM-PAC 6 Clicks Score (PT): 23    PT Discharge Summary  Anticipated Discharge Disposition (PT): home    Shaye Levin, PT  7/17/2020

## 2020-07-17 NOTE — PROGRESS NOTES
Case Management Discharge Note      Final Note: Home with family assistance and outpatient vestibular rehab.  REI Damian    Provided Post Acute Provider List?: Yes  Post Acute Provider List: Home Health, Nursing Home  Provided Post Acute Provider Quality & Resource List?: N/A  N/A Quality & Resource List Comment: The patient was provided with a HH/SNF list and not a print out of the HH/nursing home compare list as he currently denies any d/c needs.   Delivered To: Patient  Method of Delivery: In person    Destination      No service has been selected for the patient.      Durable Medical Equipment      No service has been selected for the patient.      Dialysis/Infusion      No service has been selected for the patient.      Home Medical Care      No service has been selected for the patient.      Therapy      No service has been selected for the patient.      Community Resources      No service has been selected for the patient.        Transportation Services  Private: Car    Final Discharge Disposition Code: 01 - home or self-care

## 2020-07-17 NOTE — SIGNIFICANT NOTE
07/17/20 1035   Rehab Time/Intention   Evaluation Not Performed other (see comments)  (Discussed pt with RN. Pt passed nursing swallow screen and is tolerating a regular diet. MRI was negative. ST is not indicated at this time. Please reconsult if needed. Thank you.)   Rehab Treatment   Discipline speech language pathologist

## 2020-07-17 NOTE — DISCHARGE SUMMARY
Patient Name: Db Hardin  : 1950  MRN: [id number]    Date of Admission: 2020  Date of Discharge:  2020  Primary Care Physician: Provider, No Known      Chief Complaint:   No chief complaint on file.      Discharge Diagnoses     Active Hospital Problems    Diagnosis  POA   • **Vertigo [R42]  Yes   • Dehydration [E86.0]  Yes   • Hyperthyroidism [E05.90]  Yes   • CAD (coronary artery disease) [I25.10]  Yes   • S/P coronary artery stent placement [Z95.5]  Not Applicable   • Hypertension [I10]  Yes   • Diabetes mellitus without complication (CMS/HCC) [E11.9]  Yes   • Hyperlipidemia [E78.5]  Yes   • Chronic obstructive pulmonary disease (CMS/HCC) [J44.9]  Yes      Resolved Hospital Problems   No resolved problems to display.        Hospital Course     Mr. Hardin is a 69 y.o. male with a history of CAD status post stent, hypothyroidism, hypertension, hyperlipidemia, DM 2 and COPD  who presented to [facility] from [facility] for dizziness and need for MRI.  Please see the admitting history and physical for further details.  He was found to have BPPV and dehydration and was admitted to the hospital for further evaluation and treatment.  Neurology saw in consultation who ordered an MRI that was negative for acute intracranial process. At [facility] a CT head and CTA of head and neck were preformed and were negative. He was given meclizine with some relief. His symptoms had resolved by the time he got here and he was given IVFs. Recommendations are to continue PRN meclizine and attend vestibular rehab if having recurrent symptoms.   No need to follow up with neurology. He will be discharged home today in stable condition and should follow up with his PCP in 1-2 weeks.       Day of Discharge     no new complaints, more than ready to go home.     denies tinnitus, syncope, blurred vision, focal deficits, facial droop, slurred speech, chest pain, palpitations, SOA, fever and  chills.    Physical Exam:  Temp:  [97.6 °F (36.4 °C)-98.1 °F (36.7 °C)] 97.9 °F (36.6 °C)  Heart Rate:  [54-62] 62  Resp:  [16] 16  BP: (129-170)/(72-87) 151/87  Body mass index is 24.8 kg/m².  Physical Exam   Constitutional: He is oriented to person, place, and time. He appears well-developed and well-nourished.   HENT:   Head: Normocephalic and atraumatic.   Eyes: Conjunctivae and EOM are normal.   Neck: Neck supple. No JVD present.   Cardiovascular: Normal rate and regular rhythm.   Pulmonary/Chest: Effort normal and breath sounds normal.   Abdominal: Soft. Bowel sounds are normal. He exhibits no distension. There is no tenderness.   Musculoskeletal: Normal range of motion. He exhibits no edema.   Neurological: He is alert and oriented to person, place, and time.   Skin: Skin is warm and dry.   Psychiatric: He has a normal mood and affect. His behavior is normal.   Nursing note and vitals reviewed.      Consultants     Consult Orders (all) (From admission, onward)     Start     Ordered    07/17/20 0222  Inpatient Neurology Consult Stroke  Once     Specialty:  Neurology  Provider:  Alexis Christian MD    07/17/20 0222              Procedures     Imaging Results (All)     Procedure Component Value Units Date/Time    MRI Brain Without Contrast [[id number]] Collected:  07/17/20 0934     Updated:  07/17/20 0934    Narrative:       MRI BRAIN WITHOUT CONTRAST-  07/17/2020     HISTORY: Possible stroke. Episodes of room spinning sensation.     TECHNIQUE: Multiple noncontrast axial and sagittal images were obtained  through the brain.     FINDINGS: The diffusion-weighted images show no evidence of acute  infarction. The FLAIR images show no significant white matter disease.     There is mild diffuse atrophy. No intracranial hemorrhage is seen.     Normal-appearing vascular flow voids are seen. The craniocervical  junction and sella appear normal.       Impression:       No acute intracranial process identified.                       Pertinent Labs     Results from last 7 days   Lab Units 07/17/20  0457   WBC 10*3/mm3 5.36   HEMOGLOBIN g/dL 13.9   PLATELETS 10*3/mm3 169     Results from last 7 days   Lab Units 07/17/20  0457   SODIUM mmol/L 140   POTASSIUM mmol/L 3.9   CHLORIDE mmol/L 104   CO2 mmol/L 27.7   BUN mg/dL 15   CREATININE mg/dL 0.85   GLUCOSE mg/dL 78   Estimated Creatinine Clearance: 78.6 mL/min (by C-G formula based on SCr of 0.85 mg/dL).  Results from last 7 days   Lab Units 07/17/20  0457   ALBUMIN g/dL 3.70   BILIRUBIN mg/dL 1.1   ALK PHOS U/L 82   AST (SGOT) U/L 14   ALT (SGPT) U/L 10     Results from last 7 days   Lab Units 07/17/20  0457   CALCIUM mg/dL 8.8   ALBUMIN g/dL 3.70           Results from last 7 days   Lab Units 07/17/20 0457   CHOLESTEROL mg/dL 115   TRIGLYCERIDES mg/dL 57   HDL CHOL mg/dL 44   LDL CHOL mg/dL 60           Test Results Pending at Discharge       Discharge Details        Discharge Medications      New Medications      Instructions Start Date   meclizine 25 MG tablet  Commonly known as:  ANTIVERT   25 mg, Oral, 3 Times Daily PRN         Continue These Medications      Instructions Start Date   albuterol sulfate  (90 Base) MCG/ACT inhaler  Commonly known as:  PROVENTIL HFA;VENTOLIN HFA;PROAIR HFA   2 puffs, Inhalation, Every 4 Hours PRN      Anoro Ellipta 62.5-25 MCG/INH aerosol powder  inhaler  Generic drug:  umeclidinium-vilanterol   1 puff, Inhalation, Daily - RT      aspirin 81 MG tablet   81 mg, Oral, Daily      levothyroxine 50 MCG tablet  Commonly known as:  SYNTHROID, LEVOTHROID   100 mcg, Oral, Daily      pravastatin 40 MG tablet  Commonly known as:  PRAVACHOL   40 mg, Oral, Daily      traZODone 50 MG tablet  Commonly known as:  DESYREL   50 mg, Oral, Daily             No Known Allergies      Discharge Disposition:  Home or Self Care    Discharge Diet:  Diet Order   Procedures   • Diet Regular; Cardiac       Discharge Activity:   Activity Instructions     Activity as  Tolerated            CODE STATUS:    Code Status and Medical Interventions:   Ordered at: 07/17/20 0222     Level Of Support Discussed With:    Patient     Code Status:    CPR     Medical Interventions (Level of Support Prior to Arrest):    Full       Future Appointments   Date Time Provider Department Center   8/21/2020  1:30 PM Linda Lock APRN [facility] None     Additional Instructions for the Follow-ups that You Need to Schedule     Ambulatory Referral to Physical Therapy Vestibular   As directed      Specialty needed:  Vestibular         Discharge Follow-up with PCP   As directed       Currently Documented PCP:    Provider, No Known    PCP Phone Number:    [phone]     Follow Up Details:  1-2 weeks           Follow-up Information     Provider, No Known .    Why:  1-2 weeks  Contact information:  [place] [address]  [phone]                   Additional Instructions for the Follow-ups that You Need to Schedule     Ambulatory Referral to Physical Therapy Vestibular   As directed      Specialty needed:  Vestibular         Discharge Follow-up with PCP   As directed       Currently Documented PCP:    Provider, No Known    PCP Phone Number:    [phone]     Follow Up Details:  1-2 weeks           Time Spent on Discharge:  Greater than 30 minutes      MICHAEL Merida  [facility]  07/17/20  12:42 PM

## 2020-07-17 NOTE — H&P
Patient Name:  Db Hardin  YOB: 1950  MRN:  [id number]  Admit Date:  7/17/2020  Patient Care Team:  Provider, No Known as PCP - Carol Segal APRN as PCP - Claims Attributed      Subjective   History Present Illness     CC: dizziness    HPI  Mr. Hardin is a 69 y.o. with a history of CAD status post stent, hypothyroidism, hypertension, hyperlipidemia, DM 2 and COPD who presents to [facility] as a transfer from [facility] with complaints of dizziness. He was reportedly transferred here because MRI department at [facility] was closed and wasn't opening until this morning. Symptoms began on Wednesday when he awoke to the room spinning around him. He closed his eyes for about ten minutes and when opened again, symptoms were still present but better. He repeated this technique and was then able to get out of bed. Symptoms resolved as the day went on. When he woke up Thursday the same thing happened but the symptoms did not resolve and this time was associated with nausea and one episode of vomiting. He reports not drinking any water for over 30 years as it makes him vomit. All he drinks is sprite (2-3 16oz daily) and coffee (has cut back to 1-2 cups daily). He is retired but is outside a lot for various projects and admits to not much PO intake lately. He denies tinnitus, syncope, blurred vision, focal deficits, facial droop, slurred speech, chest pain, palpitations, SOA, fever and chills. At present symptoms have resolved. States he was given meclizine at [facility]. MRI done here was negative for acute intracranial process.        Review of Systems   Constitutional: Negative for chills and fever.   HENT: Negative for congestion, hearing loss and tinnitus.    Eyes: Negative for discharge and itching.   Respiratory: Negative for chest tightness and shortness of breath.    Cardiovascular: Negative for chest pain, palpitations and leg swelling.   Gastrointestinal: Positive for  nausea and vomiting.   Endocrine: Negative for cold intolerance and heat intolerance.   Genitourinary: Negative for difficulty urinating and dysuria.   Musculoskeletal: Negative for back pain and gait problem.   Skin: Negative for color change and pallor.   Neurological: Positive for dizziness and light-headedness. Negative for syncope, speech difficulty and weakness.   Hematological: Negative for adenopathy. Does not bruise/bleed easily.   Psychiatric/Behavioral: Negative for agitation.        Personal History     Past Medical History:   Diagnosis Date   • CAD (coronary artery disease)    • COPD (chronic obstructive pulmonary disease) (CMS/[facility])    • Diabetes (CMS/[facility])    • Disease of thyroid gland    • Elevated cholesterol    • Hyperlipidemia    • Hypertension    • Myocardial infarction (CMS/[facility]) 2011    tx with PCI   • Sinus bradycardia    • ST elevation myocardial infarction involving left anterior descending (LAD) coronary artery (CMS/HCC) 2016 tx with PCI     Past Surgical History:   Procedure Laterality Date   • CARDIAC CATHETERIZATION      :3.0x15mm Vision pLAD; 3.5x20mm Vision p circ; 3.5x28mm Vision to mRCA   • CARPAL TUNNEL RELEASE Left    • CORONARY ANGIOPLASTY     • CORONARY STENT PLACEMENT      :3.0x15mm Vision pLAD; 3.5x20mm Vision p circ; 3.5x28mm Vision to mRCA   • JOINT REPLACEMENT       Family History   Problem Relation Age of Onset   • No Known Problems Mother    • Cancer Father    • No Known Problems Sister    • No Known Problems Brother    • No Known Problems Maternal Grandmother    • No Known Problems Maternal Grandfather    • No Known Problems Paternal Grandmother    • No Known Problems Paternal Grandfather    • No Known Problems Brother    • No Known Problems Sister    • Brain cancer Brother      Social History     Tobacco Use   • Smoking status: Former Smoker     Last attempt to quit: 2011     Years since quittin.0   • Smokeless tobacco: Never Used   Substance  Use Topics   • Alcohol use: Yes     Alcohol/week: 1.0 standard drinks     Types: 1 Cans of beer per week     Comment: beer --occ   • Drug use: No     No current facility-administered medications on file prior to encounter.      Current Outpatient Medications on File Prior to Encounter   Medication Sig Dispense Refill   • albuterol (PROVENTIL HFA;VENTOLIN HFA) 108 (90 BASE) MCG/ACT inhaler Inhale 2 puffs Every 4 (Four) Hours As Needed for wheezing. 6.7 g 11   • aspirin 81 MG tablet Take 81 mg by mouth Daily.     • levothyroxine (SYNTHROID, LEVOTHROID) 50 MCG tablet Take 2 tablets by mouth Daily. 30 tablet 1   • pravastatin (PRAVACHOL) 40 MG tablet Take 40 mg by mouth Daily.     • traZODone (DESYREL) 50 MG tablet Take 50 mg by mouth Daily.     • Umeclidinium-Vilanterol (ANORO ELLIPTA) 62.5-25 MCG/INH aerosol powder  Inhale 1 puff Daily.     • [DISCONTINUED] rivaroxaban (XARELTO) 10 MG tablet Take 10 mg by mouth Daily.       No Known Allergies    Objective    Objective     Vital Signs  Temp:  [97.6 °F (36.4 °C)-98.1 °F (36.7 °C)] 97.9 °F (36.6 °C)  Heart Rate:  [54-62] 62  Resp:  [16] 16  BP: (129-170)/(72-87) 151/87  SpO2:  [94 %-95 %] 94 %  on   ;   Device (Oxygen Therapy): room air  Body mass index is 24.8 kg/m².    Physical Exam   Constitutional: He is oriented to person, place, and time. He appears well-developed and well-nourished.   HENT:   Head: Normocephalic and atraumatic.   Eyes: Conjunctivae and EOM are normal. Right eye exhibits no nystagmus. Left eye exhibits no nystagmus.   Neck: Neck supple. No JVD present.   Cardiovascular: Normal rate and regular rhythm.   Pulmonary/Chest: Effort normal and breath sounds normal.   Abdominal: Soft. Bowel sounds are normal. He exhibits no distension. There is no tenderness.   Musculoskeletal: Normal range of motion. He exhibits no edema.   Neurological: He is alert and oriented to person, place, and time.   Skin: Skin is warm and dry.   Psychiatric: He has a normal mood  and affect. His behavior is normal.   Nursing note and vitals reviewed.      Results Review:  I reviewed the patient's new clinical results.  I reviewed the patient's new imaging results and agree with the interpretation.  I reviewed the patient's other test results and agree with the interpretation  I personally viewed and interpreted the patient's EKG/Telemetry data  Discussed with ED provider.    Lab Results (last 24 hours)     Procedure Component Value Units Date/Time    CBC (No Diff) [[id number]]  (Abnormal) Collected:  07/17/20 0457    Specimen:  Blood Updated:  07/17/20 0545     WBC 5.36 10*3/mm3      RBC 4.20 10*6/mm3      Hemoglobin 13.9 g/dL      Hematocrit 39.8 %      MCV 94.8 fL      MCH 33.1 pg      MCHC 34.9 g/dL      RDW 13.4 %      RDW-SD 46.6 fl      MPV 10.1 fL      Platelets 169 10*3/mm3     Comprehensive Metabolic Panel [[id number]]  (Abnormal) Collected:  07/17/20 0457    Specimen:  Blood Updated:  07/17/20 0608     Glucose 78 mg/dL      BUN 15 mg/dL      Creatinine 0.85 mg/dL      Sodium 140 mmol/L      Potassium 3.9 mmol/L      Chloride 104 mmol/L      CO2 27.7 mmol/L      Calcium 8.8 mg/dL      Total Protein 5.8 g/dL      Albumin 3.70 g/dL      ALT (SGPT) 10 U/L      AST (SGOT) 14 U/L      Alkaline Phosphatase 82 U/L      Total Bilirubin 1.1 mg/dL      eGFR Non African Amer 89 mL/min/1.73      Globulin 2.1 gm/dL      A/G Ratio 1.8 g/dL      BUN/Creatinine Ratio 17.6     Anion Gap 8.3 mmol/L     Narrative:       GFR Normal >60  Chronic Kidney Disease <60  Kidney Failure <15      Hemoglobin A1c [[id number]]  (Normal) Collected:  07/17/20 0457    Specimen:  Blood Updated:  07/17/20 0557     Hemoglobin A1C 5.60 %     Narrative:       Hemoglobin A1C Ranges:    Increased Risk for Diabetes  5.7% to 6.4%  Diabetes                     >= 6.5%  Diabetic Goal                < 7.0%    Lipid Panel [[id number]] Collected:  07/17/20 0457    Specimen:  Blood Updated:  07/17/20 0608     Total Cholesterol 115  mg/dL      Triglycerides 57 mg/dL      HDL Cholesterol 44 mg/dL      LDL Cholesterol  60 mg/dL      VLDL Cholesterol 11.4 mg/dL      LDL/HDL Ratio 1.35    Narrative:       Cholesterol Reference Ranges  (U.S. Department of Health and Human Services ATP III Classifications)    Desirable          <200 mg/dL  Borderline High    200-239 mg/dL  High Risk          >240 mg/dL      Triglyceride Reference Ranges  (U.S. Department of Health and Human Services ATP III Classifications)    Normal           <150 mg/dL  Borderline High  150-199 mg/dL  High             200-499 mg/dL  Very High        >500 mg/dL    HDL Reference Ranges  (U.S. Department of Health and Human Services ATP III Classifcations)    Low     <40 mg/dl (major risk factor for CHD)  High    >60 mg/dl ('negative' risk factor for CHD)        LDL Reference Ranges  (U.S. Department of Health and Human Services ATP III Classifcations)    Optimal          <100 mg/dL  Near Optimal     100-129 mg/dL  Borderline High  130-159 mg/dL  High             160-189 mg/dL  Very High        >189 mg/dL    TSH [[id number]]  (Abnormal) Collected:  07/17/20 0457    Specimen:  Blood Updated:  07/17/20 0615     TSH 0.061 uIU/mL     POC Glucose Once [[id number]]  (Normal) Collected:  07/17/20 0627    Specimen:  Blood Updated:  07/17/20 0628     Glucose 93 mg/dL           Imaging Results (Last 24 Hours)     Procedure Component Value Units Date/Time    MRI Brain Without Contrast [[id number]] Collected:  07/17/20 0934     Updated:  07/17/20 0934    Narrative:       MRI BRAIN WITHOUT CONTRAST-  07/17/2020     HISTORY: Possible stroke. Episodes of room spinning sensation.     TECHNIQUE: Multiple noncontrast axial and sagittal images were obtained  through the brain.     FINDINGS: The diffusion-weighted images show no evidence of acute  infarction. The FLAIR images show no significant white matter disease.     There is mild diffuse atrophy. No intracranial hemorrhage is seen.     Normal-appearing  vascular flow voids are seen. The craniocervical  junction and sella appear normal.       Impression:       No acute intracranial process identified.                      Results for orders placed during the hospital encounter of 06/02/17   Transthoracic Echocardiogram 2D Complete    Narrative · Left ventricular systolic function is normal. Calculated EF = 61.9%.  · Left ventricular diastolic function is normal.  · Saline test results are positive and indicate an atrial level shunt.  · Mild tricuspid valve regurgitation is present.  · The calculated RVSP is 37 mmHg (normal)..  · There is no evidence of pericardial effusion.          No orders to display        Assessment/Plan     Active Hospital Problems    Diagnosis  POA   • Vertigo [R42]  Yes   • Dehydration [E86.0]  Unknown   • Hyperthyroidism [E05.90]  Yes   • CAD (coronary artery disease) [I25.10]  Yes   • S/P coronary artery stent placement [Z95.5]  Not Applicable   • Hypertension [I10]  Yes   • Diabetes mellitus without complication (CMS/HCC) [E11.9]  Yes   • Hyperlipidemia [E78.5]  Yes   • Chronic obstructive pulmonary disease (CMS/HCC) [J44.9]  Yes      Resolved Hospital Problems   No resolved problems to display.     · await neurology. MRI brain negative    · add meclizine PRN  · PT evaluated and patient did well, they signed off  · IVFs and encourage more PO intake throughout the day when at home. discussed different ways to flavor water.   · could likely go home later today pending further workup   · I discussed the patient's findings and my recommendations with patient, family and nursing staff.    VTE Prophylaxis - SCDs.  Code Status - Full code.       MICHAEL Merida  [facility]  07/17/20  12:38

## 2020-07-17 NOTE — PLAN OF CARE
Problem: Patient Care Overview  Goal: Plan of Care Review  Flowsheets (Taken 7/17/2020 0846)  Outcome Summary: Pt admitted with vertigo over the past 2 days. MRi is complete but pending results. Pt reports symptoms have resolved and he is mobilizing at baseline level. Pt ambualted well with no assistance needed, no LOB and no dizziniess noted. Pt appears to be functionoing at baseline level and is safe to d/c home when medically stable. PT will sign off.    Patient was wearing a face mask during this therapy encounter. Therapist used appropriate personal protective equipment including eye protection, mask, and gloves.  Mask used was standard procedure mask. Appropriate PPE was worn during the entire therapy session. Hand hygiene was completed before and after therapy session. Patient is not in enhanced droplet precautions.

## 2020-07-17 NOTE — PROGRESS NOTES
Continued Stay Note  [facility]     Patient Name: Db Hardin  MRN: [id number]  Today's Date: 7/17/2020    Admit Date: 7/17/2020    Discharge Plan     Row Name 07/17/20 1340       Plan    Plan  Home with assistance from his wife and outpatient vestibular rehab to be arranged at [facility])     Plan Comments  Discussed the order for vestibular rehab with the patient and he stated that he would want to go to outpatient rehab at [facility]) [facility], [address] [place] [address] phone number [phone] and fax number [phone].   Spoke to Adele with [facility] who requested the patient's facesheet and order for therapy be faxed to her which was sent with the patient's permission.  Adele stated that she would contact the patient to arrange for his therapy.  The patient was provided with the address and phone number for [facility] and a copy of the order.  REI Damian    Row Name 07/17/20 1210       Plan    Plan  Plans home; denies needs.  Follow to see if vestibular rehab will be needed upon d/c.      Provided Post Acute Provider List?  Yes    Post Acute Provider List  Home Health;Nursing Home    Provided Post Acute Provider Quality & Resource List?  N/A    N/A Quality & Resource List Comment  The patient was provided with a HH/SNF list and not a print out of the HH/nursing home compare list as he currently denies any d/c needs.     Delivered To  Patient    Method of Delivery  In person    Patient/Family in Agreement with Plan  yes    Plan Comments   Met with the patient at bedside; explained role of CCP, verified facesheet, checked [facility] notice and discussed discharge planning needs. The patient plans to return home upon d/c with assistance from his wife Carol Hardin [phone].  The patient uses nocturnal home O2 from [employer] and has 3 steps with no handrails  to enter his single story home with a basement that he does not have to use. The patient's PCP is MICHAEL Marie, pharmacy is [email or web address] in [facility] and the patient denies any trouble remembering to take his medication or with affording his medication. The patient denies any HH/SNF history, denies any POA documents, states that he drives himself to his appointments and his wife or daughter will transport him home upon d/c. The patient was provided with a HH/SNF list and not a print out of the HH/nursing home compare list as he currently denies any d/c needs.  P.T. And S.T. Signed off. CCP will follow to see if the patient will need any vestibular rehab upon d/c and will assist with arranging HH vs. outpatient therapy.  REI Damian        Discharge Codes    No documentation.       Expected Discharge Date and Time     Expected Discharge Date Expected Discharge Time    Jul 17, 2020             REI Carpio

## 2020-08-27 ENCOUNTER — TELEPHONE (OUTPATIENT)
Dept: CARDIOLOGY | Facility: CLINIC | Age: 70
End: 2020-08-27

## 2020-08-27 ENCOUNTER — LAB (OUTPATIENT)
Dept: LAB | Facility: HOSPITAL | Age: 70
End: 2020-08-27

## 2020-08-27 ENCOUNTER — HOSPITAL ENCOUNTER (OUTPATIENT)
Dept: GENERAL RADIOLOGY | Facility: HOSPITAL | Age: 70
Discharge: HOME | End: 2020-08-27
Admitting: NURSE PRACTITIONER

## 2020-08-27 ENCOUNTER — OFFICE VISIT (OUTPATIENT)
Dept: CARDIOLOGY | Facility: CLINIC | Age: 70
End: 2020-08-27

## 2020-08-27 VITALS
HEART RATE: 57 BPM | DIASTOLIC BLOOD PRESSURE: 80 MMHG | SYSTOLIC BLOOD PRESSURE: 120 MMHG | BODY MASS INDEX: 24.75 KG/M2 | HEIGHT: 66 IN | WEIGHT: 154 LBS

## 2020-08-27 DIAGNOSIS — R06.09 DYSPNEA ON EXERTION: ICD-10-CM

## 2020-08-27 DIAGNOSIS — I25.10 CORONARY ARTERY DISEASE INVOLVING NATIVE CORONARY ARTERY OF NATIVE HEART WITHOUT ANGINA PECTORIS: Primary | ICD-10-CM

## 2020-08-27 DIAGNOSIS — Z95.5 S/P CORONARY ARTERY STENT PLACEMENT: ICD-10-CM

## 2020-08-27 LAB
ALBUMIN SERPL-MCNC: 4.3 G/DL (ref 3.5–5.2)
ALBUMIN/GLOB SERPL: 2 G/DL
ALP SERPL-CCNC: 108 U/L (ref 39–117)
ALT SERPL W P-5'-P-CCNC: 16 U/L (ref 1–41)
ANION GAP SERPL CALCULATED.3IONS-SCNC: 8.4 MMOL/L (ref 5–15)
AST SERPL-CCNC: 19 U/L (ref 1–40)
BILIRUB SERPL-MCNC: 0.9 MG/DL (ref 0–1.2)
BUN SERPL-MCNC: 15 MG/DL (ref 8–23)
BUN/CREAT SERPL: 15 (ref 7–25)
CALCIUM SPEC-SCNC: 9.3 MG/DL (ref 8.6–10.5)
CHLORIDE SERPL-SCNC: 105 MMOL/L (ref 98–107)
CO2 SERPL-SCNC: 27.6 MMOL/L (ref 22–29)
CREAT SERPL-MCNC: 1 MG/DL (ref 0.76–1.27)
DEPRECATED RDW RBC AUTO: 47.1 FL (ref 37–54)
ERYTHROCYTE [DISTWIDTH] IN BLOOD BY AUTOMATED COUNT: 13.2 % (ref 12.3–15.4)
GFR SERPL CREATININE-BSD FRML MDRD: 73 ML/MIN/1.73
GLOBULIN UR ELPH-MCNC: 2.2 GM/DL
GLUCOSE SERPL-MCNC: 95 MG/DL (ref 65–99)
HCT VFR BLD AUTO: 45.6 % (ref 37.5–51)
HGB BLD-MCNC: 15.7 G/DL (ref 13–17.7)
MCH RBC QN AUTO: 33 PG (ref 26.6–33)
MCHC RBC AUTO-ENTMCNC: 34.4 G/DL (ref 31.5–35.7)
MCV RBC AUTO: 95.8 FL (ref 79–97)
NT-PROBNP SERPL-MCNC: 242.5 PG/ML (ref 0–900)
PLATELET # BLD AUTO: 205 10*3/MM3 (ref 140–450)
PMV BLD AUTO: 9.6 FL (ref 6–12)
POTASSIUM SERPL-SCNC: 5 MMOL/L (ref 3.5–5.2)
PROT SERPL-MCNC: 6.5 G/DL (ref 6–8.5)
RBC # BLD AUTO: 4.76 10*6/MM3 (ref 4.14–5.8)
SODIUM SERPL-SCNC: 141 MMOL/L (ref 136–145)
TSH SERPL DL<=0.05 MIU/L-ACNC: 0.09 UIU/ML (ref 0.27–4.2)
WBC # BLD AUTO: 5.58 10*3/MM3 (ref 3.4–10.8)

## 2020-08-27 PROCEDURE — 99214 OFFICE O/P EST MOD 30 MIN: CPT | Performed by: NURSE PRACTITIONER

## 2020-08-27 PROCEDURE — 83880 ASSAY OF NATRIURETIC PEPTIDE: CPT

## 2020-08-27 PROCEDURE — 36415 COLL VENOUS BLD VENIPUNCTURE: CPT

## 2020-08-27 PROCEDURE — 71046 X-RAY EXAM CHEST 2 VIEWS: CPT

## 2020-08-27 PROCEDURE — 85027 COMPLETE CBC AUTOMATED: CPT

## 2020-08-27 PROCEDURE — 84443 ASSAY THYROID STIM HORMONE: CPT

## 2020-08-27 PROCEDURE — 80053 COMPREHEN METABOLIC PANEL: CPT

## 2020-08-27 PROCEDURE — 93000 ELECTROCARDIOGRAM COMPLETE: CPT | Performed by: NURSE PRACTITIONER

## 2020-08-27 NOTE — TELEPHONE ENCOUNTER
I left him a voicemail regarding his lab results.  These were very unrevealing.  We will await the results of his stress test.

## 2020-08-27 NOTE — PROGRESS NOTES
Date of Office Visit: 2020  Encounter Provider: MICHAEL Jeronimo  Place of Service: [facility] CARDIOLOGY  Patient Name: Db Hardin  :1950    Chief Complaint   Patient presents with   • Coronary Artery Disease   :     HPI: Db Hardin is a 70 y.o. male, new to me, who presents today for follow-up.  Old records have been obtained and reviewed by me.  He is a patient of Dr. Resendiz's with a past cardiac history significant for coronary artery disease.  In , he underwent bare-metal stent placement in all 3 of his coronaries.  His last stress test was in 2017 which revealed no evidence of ischemia.  He also had an echocardiogram in 2017 revealing normal LV function, positive saline results indicating an atrial level shunt, and mild tricuspid regurgitation.  He was last seen in the office by Dr. Resendiz on 2019 at which time he was doing well with no complaints of angina or heart failure.  No changes were made to his medical regimen, and he was advised to follow-up in 1 year.   Over the past year, he reports that his breathing is just getting progressively worse.  Just simply walking into the building today made him very short of breath.  It used to take him only 30 minutes to cut his grass and now it takes him almost all day.  He denies any chest pain, palpitations, edema, dizziness, or syncope.  He denies any PND or orthopnea.  He was recently in the hospital with dizziness and was ultimately diagnosed with vertigo.  Since being discharged, he denies any recurrence of these episodes.  He does not really participate in any kind of physical exercise.  He is wondering if this is just due to his COPD.  He has an appointment with the pulmonologist next month.    Past Medical History:   Diagnosis Date   • CAD (coronary artery disease)    • COPD (chronic obstructive pulmonary disease) (CMS/[facility])    • Diabetes (CMS/[facility])    • Disease of thyroid gland    •  Elevated cholesterol    • Hyperlipidemia    • Hypertension    • Myocardial infarction (CMS/[facility]) 2011    tx with PCI   • Sinus bradycardia    • ST elevation myocardial infarction involving left anterior descending (LAD) coronary artery (CMS/[facility]) 2016 tx with PCI       Past Surgical History:   Procedure Laterality Date   • CARDIAC CATHETERIZATION      2011:3.0x15mm Vision pLAD; 3.5x20mm Vision p circ; 3.5x28mm Vision to mRCA   • CARPAL TUNNEL RELEASE Left    • CORONARY ANGIOPLASTY     • CORONARY STENT PLACEMENT      2011:3.0x15mm Vision pLAD; 3.5x20mm Vision p circ; 3.5x28mm Vision to mRCA   • JOINT REPLACEMENT         Social History     Socioeconomic History   • Marital status:      Spouse name: Not on file   • Number of children: Not on file   • Years of education: Not on file   • Highest education level: Not on file   Tobacco Use   • Smoking status: Former Smoker     Packs/day: 2.00     Years: 30.00     Pack years: 60.00     Last attempt to quit: 2011     Years since quittin.1   • Smokeless tobacco: Never Used   • Tobacco comment: caffeine use - coffee on occas.    Substance and Sexual Activity   • Alcohol use: Yes     Alcohol/week: 1.0 standard drinks     Types: 1 Cans of beer per week     Binge frequency: Monthly     Comment: beer --occ   • Drug use: No   • Sexual activity: Defer       Family History   Problem Relation Age of Onset   • No Known Problems Mother    • Cancer Father         lung cancer (smoker)    • No Known Problems Sister    • No Known Problems Brother    • No Known Problems Maternal Grandmother    • No Known Problems Maternal Grandfather    • No Known Problems Paternal Grandmother    • No Known Problems Paternal Grandfather    • No Known Problems Brother    • No Known Problems Sister    • Brain cancer Brother    • Heart attack Brother        Review of Systems   Constitution: Positive for malaise/fatigue. Negative for chills and fever.   Cardiovascular: Positive for  "dyspnea on exertion. Negative for chest pain, leg swelling, near-syncope, orthopnea, palpitations, paroxysmal nocturnal dyspnea and syncope.   Respiratory: Negative for cough and shortness of breath.    Musculoskeletal: Negative for joint pain, joint swelling and myalgias.   Gastrointestinal: Negative for abdominal pain, diarrhea, melena, nausea and vomiting.   Genitourinary: Negative for frequency and hematuria.   Neurological: Negative for light-headedness, numbness, paresthesias and seizures.   Allergic/Immunologic: Negative.    All other systems reviewed and are negative.      No Known Allergies      Current Outpatient Medications:   •  albuterol (PROVENTIL HFA;VENTOLIN HFA) 108 (90 BASE) MCG/ACT inhaler, Inhale 2 puffs Every 4 (Four) Hours As Needed for wheezing., Disp: 6.7 g, Rfl: 11  •  aspirin 81 MG tablet, Take 81 mg by mouth Daily., Disp: , Rfl:   •  levothyroxine (SYNTHROID, LEVOTHROID) 50 MCG tablet, Take 2 tablets by mouth Daily., Disp: 30 tablet, Rfl: 1  •  pravastatin (PRAVACHOL) 40 MG tablet, Take 40 mg by mouth Daily., Disp: , Rfl:   •  traZODone (DESYREL) 50 MG tablet, Take 50 mg by mouth Daily., Disp: , Rfl:   •  Umeclidinium-Vilanterol (ANORO ELLIPTA) 62.5-25 MCG/INH aerosol powder , Inhale 1 puff Daily., Disp: , Rfl:       Objective:     Vitals:    08/27/20 1331 08/27/20 1338   BP: 126/80 120/80   BP Location: Right arm Left arm   Patient Position: Sitting Sitting   Pulse: 57 57   Weight: 69.9 kg (154 lb)    Height: 167.6 cm (66\")      Body mass index is 24.86 kg/m².    PHYSICAL EXAM:    Physical Exam   Constitutional: He is oriented to person, place, and time. He appears well-developed and well-nourished. No distress.   HENT:   Head: Normocephalic and atraumatic.   Eyes: Pupils are equal, round, and reactive to light.   Neck: No JVD present. No thyromegaly present.   Cardiovascular: Normal rate, regular rhythm, normal heart sounds and intact distal pulses.   No murmur heard.  Pulmonary/Chest: " Effort normal. No respiratory distress. He has decreased breath sounds.   Abdominal: Soft. Bowel sounds are normal. He exhibits no distension. There is no splenomegaly or hepatomegaly. There is no tenderness.   Musculoskeletal: Normal range of motion. He exhibits no edema.   Neurological: He is alert and oriented to person, place, and time.   Skin: Skin is warm and dry. He is not diaphoretic. No erythema.   Psychiatric: He has a normal mood and affect. His behavior is normal. Judgment normal.         ECG 12 Lead  Date/Time: 8/27/2020 1:46 PM  Performed by: Linda Lock APRN  Authorized by: Linda Lock APRN   Comparison: compared with previous ECG from 8/21/2019  Similar to previous ECG  Rhythm: sinus bradycardia  Rate: bradycardic  BPM: 57    Clinical impression: normal ECG  Comments: Indication: CAD              Assessment:       Diagnosis Plan   1. Coronary artery disease involving native coronary artery of native heart without angina pectoris  ECG 12 Lead    Stress Test With Myocardial Perfusion One Day   2. S/P coronary artery stent placement     3. Dyspnea on exertion  Stress Test With Myocardial Perfusion One Day    TSH    proBNP    Comprehensive Metabolic Panel    CBC (No Diff)    XR Chest 2 View     Orders Placed This Encounter   Procedures   • XR Chest 2 View     Standing Status:   Future     Standing Expiration Date:   8/27/2021     Order Specific Question:   Reason for Exam:     Answer:   shortness of breath   • TSH     Standing Status:   Future     Standing Expiration Date:   8/27/2021   • proBNP     Standing Status:   Future     Standing Expiration Date:   8/27/2021   • Comprehensive Metabolic Panel     Standing Status:   Future     Standing Expiration Date:   8/27/2021   • CBC (No Diff)     Standing Status:   Future     Standing Expiration Date:   8/27/2021   • Stress Test With Myocardial Perfusion One Day     Standing Status:   Future     Standing Expiration Date:   8/27/2021      Order Specific Question:   What stress agent will be used?     Answer:   Regadenoson (Lexiscan)     Order Specific Question:   Difficulty walking criteria?     Answer:   Poor Exercise Tolerance     Order Specific Question:   Difficulty walking criteria?     Answer:   Severe COPD O2 dependence, CHF, Dyspnea     Order Specific Question:   Reason for exam?     Answer:   Known Coronary Artery Disease     Order Specific Question:   Known CAD specification?     Answer:   Ischemic Equivalent   • ECG 12 Lead     This order was created via procedure documentation          Plan:       Overall I think he is stable.  He denies any chest pain.  His EKG is stable and unchanged.  However, he is reporting progressive dyspnea on exertion which certainly could be an ischemic equivalent.  He appears euvolemic and really denies any other symptoms of acute heart failure.  Certainly the COPD is playing a role here, but I think it is worthwhile to proceed with an ischemic evaluation.  He has not had one since 2017.  I am also going to check baseline labs and a chest x-ray.  He has an appointment next month with pulmonary.  Certainly if all of this work-up proves to be unrevealing, we would need to consider an echocardiogram.  Further recommendations will be made pending these results.      As always, it has been a pleasure to participate in your patient's care.      Sincerely,         MICHAEL Chong

## 2020-08-28 NOTE — TELEPHONE ENCOUNTER
I left him a voicemail to call me regarding his chest x-ray results.  He needs a CT to exclude underlying lung nodule.  I would also like to ensure he has follow-up with pulmonary.

## 2020-08-31 NOTE — TELEPHONE ENCOUNTER
Pt will have an Initial Consult w/ Dr. Cedeño on 9/22/20. I faxed QIAN an CXR to Dr. Cedeño's Office @ [phone]. Confirmation Received.    CHRIS Enriquez

## 2020-08-31 NOTE — TELEPHONE ENCOUNTER
I spoke with him.  His chest x-ray noted increased rounded density in the medial left apex region on the PA view which may reflect a lung nodule.  A CT of the chest was recommended to evaluate further.  He is scheduled for an appointment with pulmonary on 9/22/2020.  I am going to have my medical assistant fax over these records.    Zoe, this patient is scheduled to see pulmonary on 9/22/2020.  He is unsure of which doctor.  Will you please find out and fax a copy of my office note and the chest x-ray results?    Courtney, he also needs scheduled for a stress test.

## 2020-09-04 ENCOUNTER — TELEPHONE (OUTPATIENT)
Dept: CARDIOLOGY | Facility: CLINIC | Age: 70
End: 2020-09-04

## 2020-09-04 ENCOUNTER — HOSPITAL ENCOUNTER (OUTPATIENT)
Dept: CARDIOLOGY | Facility: HOSPITAL | Age: 70
Discharge: HOME | End: 2020-09-04
Admitting: NURSE PRACTITIONER

## 2020-09-04 VITALS — HEIGHT: 66 IN | BODY MASS INDEX: 24.77 KG/M2 | WEIGHT: 154.1 LBS

## 2020-09-04 DIAGNOSIS — R06.09 DYSPNEA ON EXERTION: ICD-10-CM

## 2020-09-04 DIAGNOSIS — I25.10 CORONARY ARTERY DISEASE INVOLVING NATIVE CORONARY ARTERY OF NATIVE HEART WITHOUT ANGINA PECTORIS: ICD-10-CM

## 2020-09-04 LAB
BH CV NUCLEAR PRIOR STUDY: 2
BH CV STRESS BP STAGE 1: NORMAL
BH CV STRESS COMMENTS STAGE 1: NORMAL
BH CV STRESS DOSE REGADENOSON STAGE 1: 0.4
BH CV STRESS DURATION MIN STAGE 1: 0
BH CV STRESS DURATION SEC STAGE 1: 10
BH CV STRESS HR STAGE 1: 81
BH CV STRESS PROTOCOL 1: NORMAL
BH CV STRESS RECOVERY BP: NORMAL MMHG
BH CV STRESS RECOVERY HR: 61 BPM
BH CV STRESS STAGE 1: 1
LV EF NUC BP: 67 %
MAXIMAL PREDICTED HEART RATE: 148 BPM
PERCENT MAX PREDICTED HR: 54.73 %
STRESS BASELINE BP: NORMAL MMHG
STRESS BASELINE HR: 67 BPM
STRESS PERCENT HR: 64 %
STRESS POST EXERCISE DUR SEC: 10 SEC
STRESS POST PEAK BP: NORMAL MMHG
STRESS POST PEAK HR: 81 BPM
STRESS TARGET HR: 126 BPM

## 2020-09-04 PROCEDURE — 25010000002 REGADENOSON 0.4 MG/5ML SOLUTION: Performed by: NURSE PRACTITIONER

## 2020-09-04 PROCEDURE — 93017 CV STRESS TEST TRACING ONLY: CPT

## 2020-09-04 PROCEDURE — A9502 TC99M TETROFOSMIN: HCPCS | Performed by: NURSE PRACTITIONER

## 2020-09-04 PROCEDURE — 78452 HT MUSCLE IMAGE SPECT MULT: CPT

## 2020-09-04 PROCEDURE — 93018 CV STRESS TEST I&R ONLY: CPT | Performed by: INTERNAL MEDICINE

## 2020-09-04 PROCEDURE — 93016 CV STRESS TEST SUPVJ ONLY: CPT | Performed by: INTERNAL MEDICINE

## 2020-09-04 PROCEDURE — 0 TECHNETIUM TETROFOSMIN KIT: Performed by: NURSE PRACTITIONER

## 2020-09-04 PROCEDURE — 78452 HT MUSCLE IMAGE SPECT MULT: CPT | Performed by: INTERNAL MEDICINE

## 2020-09-04 RX ADMIN — TETROFOSMIN 1 DOSE: 1.38 INJECTION, POWDER, LYOPHILIZED, FOR SOLUTION INTRAVENOUS at 12:40

## 2020-09-04 RX ADMIN — REGADENOSON 0.4 MG: 0.08 INJECTION, SOLUTION INTRAVENOUS at 13:25

## 2020-09-04 RX ADMIN — TETROFOSMIN 1 DOSE: 1.38 INJECTION, POWDER, LYOPHILIZED, FOR SOLUTION INTRAVENOUS at 13:25

## 2020-09-04 NOTE — TELEPHONE ENCOUNTER
I spoke with him regarding his stress test which was normal.  I also discussed the case with Dr. Resendiz.  We are recommending he follow-up with pulmonary as scheduled.  He will call me after that appointment.  At this point, we do not feel his symptoms are cardiac.

## 2020-10-05 ENCOUNTER — TRANSCRIBE ORDERS (OUTPATIENT)
Dept: ADMINISTRATIVE | Facility: HOSPITAL | Age: 70
End: 2020-10-05

## 2020-10-05 DIAGNOSIS — R91.1 PULMONARY NODULE: Primary | ICD-10-CM

## 2020-12-03 ENCOUNTER — HOSPITAL ENCOUNTER (OUTPATIENT)
Dept: CT IMAGING | Facility: HOSPITAL | Age: 70
Discharge: HOME | End: 2020-12-03
Admitting: INTERNAL MEDICINE

## 2020-12-03 DIAGNOSIS — R91.1 PULMONARY NODULE: ICD-10-CM

## 2020-12-03 PROCEDURE — 71250 CT THORAX DX C-: CPT

## 2021-04-27 ENCOUNTER — TELEPHONE (OUTPATIENT)
Dept: CARDIOLOGY | Facility: CLINIC | Age: 71
End: 2021-04-27

## 2021-04-27 NOTE — TELEPHONE ENCOUNTER
Pt L/M re: returning your call.     I advised pt there was no documentation about anyone calling him today. Pt stated you call him around 10 am today. I advised I would make you aware of this and have you call him back. Pt verbalized all understanding.    CHRIS Augustin

## 2021-08-03 ENCOUNTER — IMMUNIZATION (OUTPATIENT)
Dept: VACCINE CLINIC | Facility: HOSPITAL | Age: 71
End: 2021-08-03

## 2021-08-03 ENCOUNTER — OFFICE VISIT (OUTPATIENT)
Dept: CARDIOLOGY | Facility: CLINIC | Age: 71
End: 2021-08-03

## 2021-08-03 VITALS
SYSTOLIC BLOOD PRESSURE: 130 MMHG | DIASTOLIC BLOOD PRESSURE: 82 MMHG | WEIGHT: 152.4 LBS | BODY MASS INDEX: 23.92 KG/M2 | HEART RATE: 60 BPM | HEIGHT: 67 IN

## 2021-08-03 DIAGNOSIS — Z95.5 S/P CORONARY ARTERY STENT PLACEMENT: Primary | ICD-10-CM

## 2021-08-03 DIAGNOSIS — I25.10 CORONARY ARTERY DISEASE INVOLVING NATIVE CORONARY ARTERY OF NATIVE HEART WITHOUT ANGINA PECTORIS: ICD-10-CM

## 2021-08-03 DIAGNOSIS — J44.9 CHRONIC OBSTRUCTIVE PULMONARY DISEASE, UNSPECIFIED COPD TYPE (HCC): ICD-10-CM

## 2021-08-03 DIAGNOSIS — I10 ESSENTIAL HYPERTENSION: ICD-10-CM

## 2021-08-03 PROBLEM — J10.1 INFLUENZA A: Status: RESOLVED | Noted: 2017-02-10 | Resolved: 2021-08-03

## 2021-08-03 PROCEDURE — 99214 OFFICE O/P EST MOD 30 MIN: CPT | Performed by: INTERNAL MEDICINE

## 2021-08-03 PROCEDURE — 91300 HC SARSCOV02 VAC 30MCG/0.3ML IM: CPT | Performed by: INTERNAL MEDICINE

## 2021-08-03 PROCEDURE — 0001A: CPT | Performed by: INTERNAL MEDICINE

## 2021-08-03 PROCEDURE — 93000 ELECTROCARDIOGRAM COMPLETE: CPT | Performed by: INTERNAL MEDICINE

## 2021-08-03 RX ORDER — TAMSULOSIN HYDROCHLORIDE 0.4 MG/1
1 CAPSULE ORAL DAILY
COMMUNITY

## 2021-08-03 RX ORDER — MECLIZINE HYDROCHLORIDE 25 MG/1
25 TABLET ORAL AS NEEDED
COMMUNITY

## 2021-08-03 RX ORDER — LEVOTHYROXINE SODIUM 88 UG/1
88 TABLET ORAL DAILY
COMMUNITY

## 2021-08-03 RX ORDER — IPRATROPIUM BROMIDE AND ALBUTEROL SULFATE 2.5; .5 MG/3ML; MG/3ML
3 SOLUTION RESPIRATORY (INHALATION) EVERY 4 HOURS PRN
COMMUNITY

## 2021-08-03 RX ORDER — FORMOTEROL FUMARATE 20 UG/2ML
SOLUTION RESPIRATORY (INHALATION)
COMMUNITY

## 2021-08-03 RX ORDER — TRIAMCINOLONE ACETONIDE 0.25 MG/G
CREAM TOPICAL DAILY
COMMUNITY

## 2021-08-03 NOTE — PROGRESS NOTES
Date of Office Visit: 19  Encounter Provider: Sidney Resendiz MD  Place of Service: [facility] CARDIOLOGY  Patient Name: Db Hardin  :1950  [id number]    Chief Complaint   Patient presents with   • Coronary Artery Disease   :     HPI: Db Hardin is a 70 y.o. male he is here for follow-up of his coronary disease.  He is a gentleman that had presented with a non-ST elevation MI in  and at that time ended up having 3 bare-metal stents implanted into all 3 of his coronary arteries.  Is a large diameter stents.  He has had normal LV function.  He stopped smoking at that point but had smoked a lot.  He did have chest pain with that non-STEMI.    He has not had any chest discomfort his main complaint is shortness of breath.  He has difficulty cutting his grass he does not have PND orthopnea edema is not any change in his weight no syncope or palpitations.  He had recent testing and was seen by his pulmonologist who describes this emphysema is very severe.  He had a CT of his chest which showed severe emphysema and also incidentally noted to have a small AAA that was less than 3 cm in diameter and his abdominal aorta.    Past Medical History:   Diagnosis Date   • CAD (coronary artery disease)    • COPD (chronic obstructive pulmonary disease) (CMS/[facility])    • Diabetes (CMS/[facility])    • Disease of thyroid gland    • Elevated cholesterol    • Hyperlipidemia    • Hypertension    • Myocardial infarction (CMS/HCC) 2011    tx with PCI   • Sinus bradycardia    • ST elevation myocardial infarction involving left anterior descending (LAD) coronary artery (CMS/HCC) 2016 tx with PCI       Past Surgical History:   Procedure Laterality Date   • CARDIAC CATHETERIZATION      2011:3.0x15mm Vision pLAD; 3.5x20mm Vision p circ; 3.5x28mm Vision to mRCA   • CARPAL TUNNEL RELEASE Left    • CORONARY ANGIOPLASTY     • CORONARY STENT PLACEMENT      :3.0x15mm Vision pLAD;  3.5x20mm Vision p circ; 3.5x28mm Vision to mRCA   • JOINT REPLACEMENT         Social History     Socioeconomic History   • Marital status:      Spouse name: Not on file   • Number of children: Not on file   • Years of education: Not on file   • Highest education level: Not on file   Tobacco Use   • Smoking status: Former Smoker     Packs/day: 2.00     Years: 30.00     Pack years: 60.00     Quit date: 7/7/2011     Years since quitting: 10.0   • Smokeless tobacco: Never Used   • Tobacco comment: caffeine use - coffee on occas.    Substance and Sexual Activity   • Alcohol use: Yes     Alcohol/week: 1.0 standard drinks     Types: 1 Cans of beer per week     Comment: beer --occ   • Drug use: No   • Sexual activity: Defer       Family History   Problem Relation Age of Onset   • No Known Problems Mother    • Cancer Father         lung cancer (smoker)    • No Known Problems Sister    • No Known Problems Brother    • No Known Problems Maternal Grandmother    • No Known Problems Maternal Grandfather    • No Known Problems Paternal Grandmother    • No Known Problems Paternal Grandfather    • No Known Problems Brother    • No Known Problems Sister    • Brain cancer Brother    • Heart attack Brother        Review of Systems   Constitutional: Negative for decreased appetite, fever, malaise/fatigue and weight loss.   HENT: Negative for nosebleeds.    Eyes: Negative for double vision.   Cardiovascular: Negative for chest pain, claudication, cyanosis, dyspnea on exertion, irregular heartbeat, leg swelling, near-syncope, orthopnea, palpitations, paroxysmal nocturnal dyspnea and syncope.   Respiratory: Negative for cough, hemoptysis and shortness of breath.    Hematologic/Lymphatic: Negative for bleeding problem.   Skin: Negative for rash.   Musculoskeletal: Negative for falls and myalgias.   Gastrointestinal: Negative for hematochezia, jaundice, melena, nausea and vomiting.   Genitourinary: Negative for hematuria.  "  Neurological: Negative for dizziness and seizures.   Psychiatric/Behavioral: Negative for altered mental status and memory loss.       No Known Allergies      Current Outpatient Medications:   •  albuterol (PROVENTIL HFA;VENTOLIN HFA) 108 (90 BASE) MCG/ACT inhaler, Inhale 2 puffs Every 4 (Four) Hours As Needed for wheezing., Disp: 6.7 g, Rfl: 11  •  aspirin 81 MG tablet, Take 81 mg by mouth Daily., Disp: , Rfl:   •  formoterol (PERFOROMIST) 20 MCG/2ML nebulizer solution, Take  by nebulization 2 (Two) Times a Day., Disp: , Rfl:   •  ipratropium-albuterol (DUO-NEB) 0.5-2.5 mg/3 ml nebulizer, Take 3 mL by nebulization Every 4 (Four) Hours As Needed for Wheezing., Disp: , Rfl:   •  levothyroxine (Euthyrox) 88 MCG tablet, Take 88 mcg by mouth Daily., Disp: , Rfl:   •  meclizine (ANTIVERT) 25 MG tablet, Take 25 mg by mouth As Needed for Dizziness., Disp: , Rfl:   •  pravastatin (PRAVACHOL) 40 MG tablet, Take 40 mg by mouth Daily., Disp: , Rfl:   •  tamsulosin (FLOMAX) 0.4 MG capsule 24 hr capsule, Take 1 capsule by mouth Daily., Disp: , Rfl:   •  traZODone (DESYREL) 50 MG tablet, Take 50 mg by mouth Daily., Disp: , Rfl:   •  triamcinolone (KENALOG) 0.025 % cream, Apply  topically to the appropriate area as directed Daily., Disp: , Rfl:   •  Umeclidinium-Vilanterol (ANORO ELLIPTA) 62.5-25 MCG/INH aerosol powder , Inhale 1 puff Daily., Disp: , Rfl:   •  levothyroxine (SYNTHROID, LEVOTHROID) 50 MCG tablet, Take 2 tablets by mouth Daily., Disp: 30 tablet, Rfl: 1     Objective:     Vitals:    08/03/21 0853   BP: 130/82   Pulse: 60   Weight: 69.1 kg (152 lb 6.4 oz)   Height: 168.9 cm (66.5\")     Body mass index is 24.23 kg/m².    Physical Exam  Constitutional:       Appearance: He is well-developed.   HENT:      Head: Normocephalic.   Eyes:      General: No scleral icterus.  Neck:      Thyroid: No thyromegaly.      Vascular: No JVD.   Cardiovascular:      Rate and Rhythm: Normal rate and regular rhythm.      Heart sounds: " Normal heart sounds. No murmur heard.   No friction rub. No gallop.    Pulmonary:      Effort: Pulmonary effort is normal.      Breath sounds: Normal breath sounds. No wheezing or rales.   Abdominal:      Palpations: Abdomen is soft.      Tenderness: There is no abdominal tenderness.   Musculoskeletal:         General: Normal range of motion.   Lymphadenopathy:      Cervical: No cervical adenopathy.   Skin:     General: Skin is warm and dry.      Findings: No rash.   Neurological:      Mental Status: He is alert and oriented to person, place, and time.           ECG 12 Lead    Date/Time: 8/3/2021 9:48 AM  Performed by: Sidney Resendiz MD  Authorized by: Sidney Resendiz MD   Comparison: compared with previous ECG   Similar to previous ECG  Rhythm: sinus rhythm    Clinical impression: normal ECG             Assessment:       Diagnosis Plan   1. S/P coronary artery stent placement     2. Essential hypertension     3. Coronary artery disease involving native coronary artery of native heart without angina pectoris     4. Chronic obstructive pulmonary disease, unspecified COPD type (CMS/HCC)            Plan:       I think from a cardiac standpoint he is doing well I think I do not see anything that indicates that his shortness of breath is cardiac disease the stents we put in her now 10 years old there are bare-metal I do not think they are giving us a problem he stopped smoking his risk modification is actually been very good since then he has severe COPD and I think that the main issue.    I spent a long time talking with him and his wife about the Covid vaccine they have had a lot of misinformation there a little bit confused they do not want to get it I explained to them I strongly recommended to it I talked to them for a long period of time and answered all their questions I am still not sure that they are going to get it I think he is at extremely high risk if he gets Covid.  We will have him come back and see  Lily Mckeon in a year and see me in 2    As always, it has been a pleasure to participate in your patient's care.      Sincerely,       Sidney Resendiz MD

## 2021-08-24 ENCOUNTER — IMMUNIZATION (OUTPATIENT)
Dept: VACCINE CLINIC | Facility: HOSPITAL | Age: 71
End: 2021-08-24

## 2021-08-24 PROCEDURE — 0002A: CPT | Performed by: INTERNAL MEDICINE

## 2021-08-24 PROCEDURE — 91300 HC SARSCOV02 VAC 30MCG/0.3ML IM: CPT | Performed by: INTERNAL MEDICINE

## 2021-09-29 NOTE — PROGRESS NOTES
"DAILY PROGRESS NOTE  [facility]    Patient Identification:  Name: Db Hardin  Age: 66 y.o.  Sex: male  :  1950  MRN: [id number]         Primary Care Physician: MICHAEL Collado      Subjective  No new c/o.  Still SOA.  Cough persists.    Objective:  General Appearance:  Comfortable, well-appearing, in no acute distress and not in pain.    Vital signs: (most recent): Blood pressure 104/68, pulse 75, temperature 98.1 °F (36.7 °C), temperature source Oral, resp. rate 20, height 66\" (167.6 cm), weight 148 lb (67.1 kg), SpO2 93 %.    Lungs:  Tachypnea.  He is not in respiratory distress.  No stridor.  There are wheezes (Thru out.), rhonchi (Occational. ) and decreased breath sounds (Moderate increase in exp phase.  No insp retractions. ).  No rales.    Heart: Normal rate.  Regular rhythm.    Extremities: There is no dependent edema.    Neurological: Patient is alert and oriented to person, place and time.    Skin:  Warm and dry.                Vital signs in last 24 hours:  Temp:  [96.1 °F (35.6 °C)-99 °F (37.2 °C)] 98.1 °F (36.7 °C)  Heart Rate:  [] 75  Resp:  [16-22] 20  BP: ()/(68-82) 104/68    Intake/Output:    Intake/Output Summary (Last 24 hours) at 17 1242  Last data filed at 17 0623   Gross per 24 hour   Intake   3536 ml   Output      0 ml   Net   3536 ml         Exam:    Physical Exam   Cardiovascular: Normal rate and regular rhythm.    Pulmonary/Chest: No stridor. Tachypnea noted. No respiratory distress. He has decreased breath sounds (Moderate increase in exp phase.  No insp retractions. ). He has wheezes (Thru out.). He has rhonchi (Occational. ). He has no rales.   Neurological: He is alert.   Skin: Skin is warm and dry.         Results from last 7 days  Lab Units 02/10/17  1556   WBC 10*3/mm3 11.52*   HEMOGLOBIN g/dL 17.1   PLATELETS 10*3/mm3 207     Results from last 7 days  Lab Units 17  0641 02/10/17  1556   SODIUM mmol/L 143 141 "   POTASSIUM mmol/L 4.0 4.3   CHLORIDE mmol/L 106 99   TOTAL CO2 mmol/L 24.0 26.5   BUN mg/dL 40* 47*   CREATININE mg/dL 0.98 1.38*   GLUCOSE mg/dL 90 117*   Estimated Creatinine Clearance: 65.1 mL/min (by C-G formula based on Cr of 0.98).  Results from last 7 days  Lab Units 02/11/17  0641 02/10/17  1556   CALCIUM mg/dL 8.1* 9.6   ALBUMIN g/dL  --  4.20     Results from last 7 days  Lab Units 02/10/17  1556   ALBUMIN g/dL 4.20   BILIRUBIN mg/dL 0.5   ALK PHOS U/L 62   AST (SGOT) U/L 46*   ALT (SGPT) U/L 25       Assessment:  Influenza A:  Tamiflu  COPD exacerbation: Add steroids. Increase aerosol tx.   Hypoxemia:  O2.  Monitor closely.   Diabetes mellitus without complication:  SSI.   Essential hypertension:  DESI (acute kidney injury):  Resolved. Decrease IVF.   CAD (coronary artery disease): Stable.         Plan:  Please see above.     Alessandro Saab MD  2/11/2017  12:42 PM     N/A

## 2022-08-11 ENCOUNTER — OFFICE VISIT (OUTPATIENT)
Dept: CARDIOLOGY | Facility: CLINIC | Age: 72
End: 2022-08-11

## 2022-08-11 VITALS
SYSTOLIC BLOOD PRESSURE: 130 MMHG | BODY MASS INDEX: 24.75 KG/M2 | HEART RATE: 64 BPM | DIASTOLIC BLOOD PRESSURE: 72 MMHG | WEIGHT: 154 LBS | HEIGHT: 66 IN

## 2022-08-11 DIAGNOSIS — I25.10 CORONARY ARTERY DISEASE INVOLVING NATIVE CORONARY ARTERY OF NATIVE HEART WITHOUT ANGINA PECTORIS: Primary | ICD-10-CM

## 2022-08-11 DIAGNOSIS — R06.09 DYSPNEA ON EXERTION: ICD-10-CM

## 2022-08-11 DIAGNOSIS — I10 PRIMARY HYPERTENSION: ICD-10-CM

## 2022-08-11 DIAGNOSIS — E78.5 HYPERLIPIDEMIA, UNSPECIFIED HYPERLIPIDEMIA TYPE: ICD-10-CM

## 2022-08-11 PROCEDURE — 99214 OFFICE O/P EST MOD 30 MIN: CPT | Performed by: NURSE PRACTITIONER

## 2022-08-11 PROCEDURE — 93000 ELECTROCARDIOGRAM COMPLETE: CPT | Performed by: NURSE PRACTITIONER

## 2022-08-11 RX ORDER — BUDESONIDE 0.5 MG/2ML
0.5 INHALANT ORAL DAILY
COMMUNITY

## 2022-08-11 NOTE — PROGRESS NOTES
Date of Office Visit: 2022  Encounter Provider: MICHAEL Hines  Place of Service: [facility] CARDIOLOGY  Patient Name: Db Hardin  :1950    Chief Complaint   Patient presents with   • Coronary Artery Disease   :     HPI: Db Hardin is a 71 y.o. male.  He is a patient of Dr. Resendiz's whom I follow for the management of hypertension, hyperlipidemia, and coronary artery disease.  In , he suffered a STEMI and underwent bare-metal stent placement in all 3 of his coronaries.  In 2020, he presented with shortness of breath.  Stress test was negative for ischemia.  Chest x-ray demonstrated a possible lung nodule for which a CT was recommended.  He was advised to follow-up with pulmonary.  Subsequently, he had a CT of his chest demonstrating severe emphysema.   He was last seen in the office by Dr. Resendiz in 2019 at which time he was doing well with no complaints of angina or heart failure.  No changes were made to his regimen, and he was advised to follow-up in 1 year.   Overall, he has been doing well.  He does report worsening dyspnea on exertion.  He cannot differentiate between whether it is his COPD or his heart.  He saw pulmonary yesterday and he did not have much to add.  He has bilateral ankle swelling that does not improve with elevation.  He denies any chest pain, PND, orthopnea, palpitations, dizziness, or syncope.    Past Medical History:   Diagnosis Date   • CAD (coronary artery disease)    • COPD (chronic obstructive pulmonary disease) (HCC)    • Diabetes ([facility])    • Disease of thyroid gland    • Elevated cholesterol    • Hyperlipidemia    • Hypertension    • Myocardial infarction (HCC) 2011    tx with PCI   • Sinus bradycardia    • ST elevation myocardial infarction involving left anterior descending (LAD) coronary artery ([facility]) 2016 tx with PCI       Past Surgical History:   Procedure Laterality Date   • CARDIAC  CATHETERIZATION      2011:3.0x15mm Vision pLAD; 3.5x20mm Vision p circ; 3.5x28mm Vision to mRCA   • CARPAL TUNNEL RELEASE Left    • CORONARY ANGIOPLASTY     • CORONARY STENT PLACEMENT      2011:3.0x15mm Vision pLAD; 3.5x20mm Vision p circ; 3.5x28mm Vision to mRCA   • JOINT REPLACEMENT         Social History     Socioeconomic History   • Marital status:    Tobacco Use   • Smoking status: Former Smoker     Packs/day: 2.00     Years: 30.00     Pack years: 60.00     Quit date: 2011     Years since quittin.1   • Smokeless tobacco: Never Used   • Tobacco comment: caffeine use - coffee on occas.    Substance and Sexual Activity   • Alcohol use: Yes     Alcohol/week: 1.0 standard drink     Types: 1 Cans of beer per week     Comment: beer --occ   • Drug use: No   • Sexual activity: Defer       Family History   Problem Relation Age of Onset   • No Known Problems Mother    • Cancer Father         lung cancer (smoker)    • No Known Problems Sister    • No Known Problems Brother    • No Known Problems Maternal Grandmother    • No Known Problems Maternal Grandfather    • No Known Problems Paternal Grandmother    • No Known Problems Paternal Grandfather    • No Known Problems Brother    • No Known Problems Sister    • Brain cancer Brother    • Heart attack Brother        Review of Systems   Constitutional: Negative.   Cardiovascular: Positive for dyspnea on exertion and leg swelling. Negative for chest pain, orthopnea, paroxysmal nocturnal dyspnea and syncope.   Respiratory: Negative.    Hematologic/Lymphatic: Negative for bleeding problem.   Musculoskeletal: Negative for falls.   Gastrointestinal: Negative for melena.   Neurological: Negative for dizziness and light-headedness.       No Known Allergies      Current Outpatient Medications:   •  albuterol (PROVENTIL HFA;VENTOLIN HFA) 108 (90 BASE) MCG/ACT inhaler, Inhale 2 puffs Every 4 (Four) Hours As Needed for wheezing., Disp: 6.7 g, Rfl: 11  •  aspirin 81 MG  "tablet, Take 81 mg by mouth Daily., Disp: , Rfl:   •  budesonide (PULMICORT) 0.5 MG/2ML nebulizer solution, Inhale 0.5 mg Daily., Disp: , Rfl:   •  formoterol (PERFOROMIST) 20 MCG/2ML nebulizer solution, Take  by nebulization 2 (Two) Times a Day., Disp: , Rfl:   •  ipratropium-albuterol (DUO-NEB) 0.5-2.5 mg/3 ml nebulizer, Take 3 mL by nebulization Every 4 (Four) Hours As Needed for Wheezing., Disp: , Rfl:   •  levothyroxine (SYNTHROID, LEVOTHROID) 88 MCG tablet, Take 88 mcg by mouth Daily., Disp: , Rfl:   •  meclizine (ANTIVERT) 25 MG tablet, Take 25 mg by mouth As Needed for Dizziness., Disp: , Rfl:   •  pravastatin (PRAVACHOL) 40 MG tablet, Take 40 mg by mouth Daily., Disp: , Rfl:   •  tamsulosin (FLOMAX) 0.4 MG capsule 24 hr capsule, Take 1 capsule by mouth Daily., Disp: , Rfl:   •  traZODone (DESYREL) 50 MG tablet, Take 50 mg by mouth Daily., Disp: , Rfl:   •  triamcinolone (KENALOG) 0.025 % cream, Apply  topically to the appropriate area as directed Daily., Disp: , Rfl:   •  umeclidinium-vilanterol (ANORO ELLIPTA) 62.5-25 MCG/INH aerosol powder  inhaler, Inhale 1 puff Daily., Disp: , Rfl:       Objective:     Vitals:    08/11/22 1358   BP: 130/72   Pulse: 64   Weight: 69.9 kg (154 lb)   Height: 167.6 cm (66\")     Body mass index is 24.86 kg/m².    PHYSICAL EXAM:    Neck:      Vascular: No JVD.   Pulmonary:      Effort: Pulmonary effort is normal.      Breath sounds: Normal breath sounds.   Cardiovascular:      Normal rate. Regular rhythm.      Murmurs: There is no murmur.      No gallop. No click. No rub.   Pulses:     Intact distal pulses.           ECG 12 Lead    Date/Time: 8/11/2022 2:05 PM  Performed by: Linda Lock APRN  Authorized by: Linda Lock APRN   Comparison: compared with previous ECG from 8/3/2021  Similar to previous ECG  Rhythm: sinus rhythm  Rate: normal  BPM: 64  T inversion: V1                Assessment:       Diagnosis Plan   1. Coronary artery disease involving native " coronary artery of native heart without angina pectoris  ECG 12 Lead   2. Primary hypertension     3. Hyperlipidemia, unspecified hyperlipidemia type     4. Dyspnea on exertion  Adult Transthoracic Echo Complete W/ Cont if Necessary Per Protocol     Orders Placed This Encounter   Procedures   • ECG 12 Lead     This order was created via procedure documentation     Order Specific Question:   Release to patient     Answer:   Routine Release   • Adult Transthoracic Echo Complete W/ Cont if Necessary Per Protocol     Standing Status:   Future     Standing Expiration Date:   8/11/2023     Order Specific Question:   Reason for exam?     Answer:   Dyspnea          Plan:       1.  Coronary artery disease.  History of bare-metal stenting in 2011.  Stress test from September 2020 was negative for ischemia.  He denies any chest pain.  Continue aspirin and pravastatin.      2.  Hypertension.  His blood pressure is stable.  Continue current regimen.      3.  Hyperlipidemia.  Lipid panel from June demonstrated an LDL of 67 and HDL of 53.  Continue pravastatin.      4.  Dyspnea on exertion.  Certainly this could be related to his severe emphysema.  However, I would like to order an echocardiogram as it has been a number of years since he has had one.  If this proves unrevealing, we will need to consider ischemia.      Overall, I think he is stable.  Further recommendations will be made pending the results of the above.      As always, it has been a pleasure to participate in your patient's care.      Sincerely,         MICHAEL Chong

## 2022-08-29 ENCOUNTER — HOSPITAL ENCOUNTER (OUTPATIENT)
Dept: CARDIOLOGY | Facility: HOSPITAL | Age: 72
Discharge: HOME | End: 2022-08-29
Admitting: NURSE PRACTITIONER

## 2022-08-29 VITALS
SYSTOLIC BLOOD PRESSURE: 126 MMHG | BODY MASS INDEX: 24.75 KG/M2 | DIASTOLIC BLOOD PRESSURE: 78 MMHG | HEIGHT: 66 IN | WEIGHT: 154 LBS | HEART RATE: 68 BPM

## 2022-08-29 DIAGNOSIS — R06.09 DYSPNEA ON EXERTION: ICD-10-CM

## 2022-08-29 PROCEDURE — 93306 TTE W/DOPPLER COMPLETE: CPT

## 2022-08-29 PROCEDURE — 93306 TTE W/DOPPLER COMPLETE: CPT | Performed by: INTERNAL MEDICINE

## 2022-08-30 ENCOUNTER — TELEPHONE (OUTPATIENT)
Dept: CARDIOLOGY | Facility: CLINIC | Age: 72
End: 2022-08-30

## 2022-08-30 DIAGNOSIS — R06.09 DYSPNEA ON EXERTION: Primary | ICD-10-CM

## 2022-08-30 DIAGNOSIS — I25.10 CORONARY ARTERY DISEASE INVOLVING NATIVE CORONARY ARTERY OF NATIVE HEART WITHOUT ANGINA PECTORIS: ICD-10-CM

## 2022-08-30 LAB
AORTIC ARCH: 2.4 CM
ASCENDING AORTA: 3.4 CM
BH CV ECHO MEAS - ACS: 1.78 CM
BH CV ECHO MEAS - AO MAX PG: 5.2 MMHG
BH CV ECHO MEAS - AO MEAN PG: 2.8 MMHG
BH CV ECHO MEAS - AO ROOT DIAM: 3.8 CM
BH CV ECHO MEAS - AO V2 MAX: 114 CM/SEC
BH CV ECHO MEAS - AO V2 VTI: 23.1 CM
BH CV ECHO MEAS - AVA(I,D): 1.38 CM2
BH CV ECHO MEAS - EDV(CUBED): 78.6 ML
BH CV ECHO MEAS - EDV(MOD-SP2): 55 ML
BH CV ECHO MEAS - EDV(MOD-SP4): 56 ML
BH CV ECHO MEAS - EF(MOD-BP): 58.9 %
BH CV ECHO MEAS - EF(MOD-SP2): 60 %
BH CV ECHO MEAS - EF(MOD-SP4): 60.7 %
BH CV ECHO MEAS - ESV(CUBED): 26.8 ML
BH CV ECHO MEAS - ESV(MOD-SP2): 22 ML
BH CV ECHO MEAS - ESV(MOD-SP4): 22 ML
BH CV ECHO MEAS - FS: 30.1 %
BH CV ECHO MEAS - IVS/LVPW: 0.98 CM
BH CV ECHO MEAS - IVSD: 1.03 CM
BH CV ECHO MEAS - LAT PEAK E' VEL: 8.9 CM/SEC
BH CV ECHO MEAS - LV DIASTOLIC VOL/BSA (35-75): 31.3 CM2
BH CV ECHO MEAS - LV MASS(C)D: 149.6 GRAMS
BH CV ECHO MEAS - LV MAX PG: 1.38 MMHG
BH CV ECHO MEAS - LV MEAN PG: 0.91 MMHG
BH CV ECHO MEAS - LV SYSTOLIC VOL/BSA (12-30): 12.3 CM2
BH CV ECHO MEAS - LV V1 MAX: 58.7 CM/SEC
BH CV ECHO MEAS - LV V1 VTI: 11.6 CM
BH CV ECHO MEAS - LVIDD: 4.3 CM
BH CV ECHO MEAS - LVIDS: 3 CM
BH CV ECHO MEAS - LVOT AREA: 2.7 CM2
BH CV ECHO MEAS - LVOT DIAM: 1.87 CM
BH CV ECHO MEAS - LVPWD: 1.05 CM
BH CV ECHO MEAS - MED PEAK E' VEL: 6.6 CM/SEC
BH CV ECHO MEAS - MV A DUR: 0.1 SEC
BH CV ECHO MEAS - MV A MAX VEL: 85.3 CM/SEC
BH CV ECHO MEAS - MV DEC SLOPE: 188 CM/SEC2
BH CV ECHO MEAS - MV DEC TIME: 0.19 MSEC
BH CV ECHO MEAS - MV E MAX VEL: 67.3 CM/SEC
BH CV ECHO MEAS - MV E/A: 0.79
BH CV ECHO MEAS - MV MAX PG: 3.2 MMHG
BH CV ECHO MEAS - MV MEAN PG: 1.13 MMHG
BH CV ECHO MEAS - MV P1/2T: 114.2 MSEC
BH CV ECHO MEAS - MV V2 VTI: 28.7 CM
BH CV ECHO MEAS - MVA(P1/2T): 1.93 CM2
BH CV ECHO MEAS - MVA(VTI): 1.12 CM2
BH CV ECHO MEAS - PA ACC TIME: 0.08 SEC
BH CV ECHO MEAS - PA PR(ACCEL): 44.5 MMHG
BH CV ECHO MEAS - PA V2 MAX: 95 CM/SEC
BH CV ECHO MEAS - PULM A REVS DUR: 0.13 SEC
BH CV ECHO MEAS - PULM A REVS VEL: 24.4 CM/SEC
BH CV ECHO MEAS - PULM DIAS VEL: 33.4 CM/SEC
BH CV ECHO MEAS - PULM S/D: 1.44
BH CV ECHO MEAS - PULM SYS VEL: 48 CM/SEC
BH CV ECHO MEAS - QP/QS: 0.86
BH CV ECHO MEAS - RAP SYSTOLE: 3 MMHG
BH CV ECHO MEAS - RV MAX PG: 1.38 MMHG
BH CV ECHO MEAS - RV V1 MAX: 58.7 CM/SEC
BH CV ECHO MEAS - RV V1 VTI: 10.6 CM
BH CV ECHO MEAS - RVOT DIAM: 1.81 CM
BH CV ECHO MEAS - RVSP: 22.8 MMHG
BH CV ECHO MEAS - SI(MOD-SP2): 18.4 ML/M2
BH CV ECHO MEAS - SI(MOD-SP4): 19 ML/M2
BH CV ECHO MEAS - SUP REN AO DIAM: 1.9 CM
BH CV ECHO MEAS - SV(LVOT): 32 ML
BH CV ECHO MEAS - SV(MOD-SP2): 33 ML
BH CV ECHO MEAS - SV(MOD-SP4): 34 ML
BH CV ECHO MEAS - SV(RVOT): 27.3 ML
BH CV ECHO MEAS - TAPSE (>1.6): 1.74 CM
BH CV ECHO MEAS - TR MAX PG: 19.8 MMHG
BH CV ECHO MEAS - TR MAX VEL: 222.6 CM/SEC
BH CV ECHO MEASUREMENTS AVERAGE E/E' RATIO: 8.68
BH CV XLRA - RV BASE: 2.28 CM
BH CV XLRA - RV LENGTH: 6.7 CM
BH CV XLRA - RV MID: 1.9 CM
BH CV XLRA - TDI S': 13.3 CM/SEC
LEFT ATRIUM VOLUME INDEX: 15.2 ML/M2
MAXIMAL PREDICTED HEART RATE: 146 BPM
SINUS: 3.4 CM
STJ: 3.4 CM
STRESS TARGET HR: 124 BPM

## 2022-08-30 NOTE — TELEPHONE ENCOUNTER
Please let him know the echocardiogram demonstrated normal heart function and no significant valvular abnormalities.  Nothing that would explain the shortness of breath.  We should probably consider a stress test.  Please see if he is agreeable.

## 2022-08-30 NOTE — TELEPHONE ENCOUNTER
Notified patient of results/recommendations. Patient verbalized understanding. He is agreeable to stress test.    Arelis Bean RN  Triage [facility] [place]

## 2022-09-12 ENCOUNTER — HOSPITAL ENCOUNTER (OUTPATIENT)
Dept: CARDIOLOGY | Facility: HOSPITAL | Age: 72
Discharge: HOME | End: 2022-09-12
Admitting: NURSE PRACTITIONER

## 2022-09-12 VITALS — WEIGHT: 154.32 LBS | HEIGHT: 66 IN | BODY MASS INDEX: 24.8 KG/M2

## 2022-09-12 DIAGNOSIS — R06.09 DYSPNEA ON EXERTION: ICD-10-CM

## 2022-09-12 DIAGNOSIS — I25.10 CORONARY ARTERY DISEASE INVOLVING NATIVE CORONARY ARTERY OF NATIVE HEART WITHOUT ANGINA PECTORIS: ICD-10-CM

## 2022-09-12 LAB
BH CV NUCLEAR PRIOR STUDY: 3
BH CV REST NUCLEAR ISOTOPE DOSE: 10.6 MCI
BH CV STRESS BP STAGE 1: NORMAL
BH CV STRESS COMMENTS STAGE 1: NORMAL
BH CV STRESS DOSE REGADENOSON STAGE 1: 0.4
BH CV STRESS DURATION MIN STAGE 1: 0
BH CV STRESS DURATION SEC STAGE 1: 10
BH CV STRESS HR STAGE 1: 103
BH CV STRESS NUCLEAR ISOTOPE DOSE: 34.2 MCI
BH CV STRESS O2 STAGE 1: 94
BH CV STRESS PROTOCOL 1: NORMAL
BH CV STRESS RECOVERY BP: NORMAL MMHG
BH CV STRESS RECOVERY HR: 69 BPM
BH CV STRESS STAGE 1: 1
LV EF NUC BP: 69 %
MAXIMAL PREDICTED HEART RATE: 146 BPM
PERCENT MAX PREDICTED HR: 70.55 %
STRESS BASELINE BP: NORMAL MMHG
STRESS BASELINE HR: 72 BPM
STRESS PERCENT HR: 83 %
STRESS POST EXERCISE DUR SEC: 10 SEC
STRESS POST O2 SAT PEAK: 94 %
STRESS POST PEAK BP: NORMAL MMHG
STRESS POST PEAK HR: 103 BPM
STRESS TARGET HR: 124 BPM

## 2022-09-12 PROCEDURE — 93018 CV STRESS TEST I&R ONLY: CPT | Performed by: INTERNAL MEDICINE

## 2022-09-12 PROCEDURE — 78452 HT MUSCLE IMAGE SPECT MULT: CPT | Performed by: INTERNAL MEDICINE

## 2022-09-12 PROCEDURE — A9502 TC99M TETROFOSMIN: HCPCS | Performed by: NURSE PRACTITIONER

## 2022-09-12 PROCEDURE — 93017 CV STRESS TEST TRACING ONLY: CPT

## 2022-09-12 PROCEDURE — 93016 CV STRESS TEST SUPVJ ONLY: CPT | Performed by: INTERNAL MEDICINE

## 2022-09-12 PROCEDURE — 0 TECHNETIUM TETROFOSMIN KIT: Performed by: NURSE PRACTITIONER

## 2022-09-12 PROCEDURE — 78452 HT MUSCLE IMAGE SPECT MULT: CPT

## 2022-09-12 PROCEDURE — 25010000002 REGADENOSON 0.4 MG/5ML SOLUTION: Performed by: NURSE PRACTITIONER

## 2022-09-12 RX ADMIN — REGADENOSON 0.4 MG: 0.08 INJECTION, SOLUTION INTRAVENOUS at 13:17

## 2022-09-12 RX ADMIN — TETROFOSMIN 1 DOSE: 1.38 INJECTION, POWDER, LYOPHILIZED, FOR SOLUTION INTRAVENOUS at 12:39

## 2022-09-12 RX ADMIN — TETROFOSMIN 1 DOSE: 1.38 INJECTION, POWDER, LYOPHILIZED, FOR SOLUTION INTRAVENOUS at 13:17

## 2023-02-08 ENCOUNTER — TRANSCRIBE ORDERS (OUTPATIENT)
Dept: ADMINISTRATIVE | Facility: HOSPITAL | Age: 73
End: 2023-02-08
Payer: MEDICARE

## 2023-02-08 DIAGNOSIS — Z12.2 SCREENING FOR LUNG CANCER: Primary | ICD-10-CM

## 2023-05-05 ENCOUNTER — APPOINTMENT (OUTPATIENT)
Dept: GENERAL RADIOLOGY | Facility: HOSPITAL | Age: 73
End: 2023-05-05
Payer: MEDICARE

## 2023-05-05 ENCOUNTER — HOSPITAL ENCOUNTER (INPATIENT)
Facility: HOSPITAL | Age: 73
LOS: 5 days | Discharge: HOME | End: 2023-05-10
Attending: EMERGENCY MEDICINE | Admitting: STUDENT IN AN ORGANIZED HEALTH CARE EDUCATION/TRAINING PROGRAM
Payer: MEDICARE

## 2023-05-05 DIAGNOSIS — R50.9 FEVER IN ADULT: ICD-10-CM

## 2023-05-05 DIAGNOSIS — J96.01 ACUTE HYPOXEMIC RESPIRATORY FAILURE: Primary | ICD-10-CM

## 2023-05-05 DIAGNOSIS — J44.9 CHRONIC OBSTRUCTIVE PULMONARY DISEASE, UNSPECIFIED COPD TYPE: ICD-10-CM

## 2023-05-05 DIAGNOSIS — B34.8 PARAINFLUENZA INFECTION: ICD-10-CM

## 2023-05-05 PROBLEM — J44.1 COPD EXACERBATION: Status: ACTIVE | Noted: 2023-05-05

## 2023-05-05 PROBLEM — J96.21 ACUTE ON CHRONIC RESPIRATORY FAILURE WITH HYPOXIA: Status: ACTIVE | Noted: 2023-05-05

## 2023-05-05 LAB
ALBUMIN SERPL-MCNC: 4.7 G/DL (ref 3.5–5.2)
ALBUMIN/GLOB SERPL: 2 G/DL
ALP SERPL-CCNC: 104 U/L (ref 39–117)
ALT SERPL W P-5'-P-CCNC: 21 U/L (ref 1–41)
ANION GAP SERPL CALCULATED.3IONS-SCNC: 9 MMOL/L (ref 5–15)
APTT PPP: 24.1 SECONDS (ref 22.7–35.4)
AST SERPL-CCNC: 23 U/L (ref 1–40)
B PARAPERT DNA SPEC QL NAA+PROBE: NOT DETECTED
B PERT DNA SPEC QL NAA+PROBE: NOT DETECTED
BACTERIA UR QL AUTO: NORMAL /HPF
BASOPHILS # BLD AUTO: 0.02 10*3/MM3 (ref 0–0.2)
BASOPHILS NFR BLD AUTO: 0.2 % (ref 0–1.5)
BILIRUB SERPL-MCNC: 1.3 MG/DL (ref 0–1.2)
BILIRUB UR QL STRIP: NEGATIVE
BUN SERPL-MCNC: 34 MG/DL (ref 8–23)
BUN/CREAT SERPL: 28.8 (ref 7–25)
C PNEUM DNA NPH QL NAA+NON-PROBE: NOT DETECTED
CALCIUM SPEC-SCNC: 9 MG/DL (ref 8.6–10.5)
CHLORIDE SERPL-SCNC: 99 MMOL/L (ref 98–107)
CLARITY UR: CLEAR
CO2 SERPL-SCNC: 30 MMOL/L (ref 22–29)
COLOR UR: ABNORMAL
CREAT SERPL-MCNC: 1.18 MG/DL (ref 0.76–1.27)
D-LACTATE SERPL-SCNC: 1.9 MMOL/L (ref 0.5–2)
DEPRECATED RDW RBC AUTO: 46.4 FL (ref 37–54)
EGFRCR SERPLBLD CKD-EPI 2021: 64.8 ML/MIN/1.73
EOSINOPHIL # BLD AUTO: 0.01 10*3/MM3 (ref 0–0.4)
EOSINOPHIL NFR BLD AUTO: 0.1 % (ref 0.3–6.2)
ERYTHROCYTE [DISTWIDTH] IN BLOOD BY AUTOMATED COUNT: 13.4 % (ref 12.3–15.4)
FLUAV SUBTYP SPEC NAA+PROBE: NOT DETECTED
FLUBV RNA ISLT QL NAA+PROBE: NOT DETECTED
GEN 5 2HR TROPONIN T REFLEX: 23 NG/L
GLOBULIN UR ELPH-MCNC: 2.4 GM/DL
GLUCOSE SERPL-MCNC: 111 MG/DL (ref 65–99)
GLUCOSE UR STRIP-MCNC: NEGATIVE MG/DL
HADV DNA SPEC NAA+PROBE: NOT DETECTED
HCOV 229E RNA SPEC QL NAA+PROBE: NOT DETECTED
HCOV HKU1 RNA SPEC QL NAA+PROBE: NOT DETECTED
HCOV NL63 RNA SPEC QL NAA+PROBE: NOT DETECTED
HCOV OC43 RNA SPEC QL NAA+PROBE: NOT DETECTED
HCT VFR BLD AUTO: 48.4 % (ref 37.5–51)
HGB BLD-MCNC: 17.1 G/DL (ref 13–17.7)
HGB UR QL STRIP.AUTO: NEGATIVE
HMPV RNA NPH QL NAA+NON-PROBE: NOT DETECTED
HPIV1 RNA ISLT QL NAA+PROBE: NOT DETECTED
HPIV2 RNA SPEC QL NAA+PROBE: NOT DETECTED
HPIV3 RNA NPH QL NAA+PROBE: DETECTED
HPIV4 P GENE NPH QL NAA+PROBE: NOT DETECTED
HYALINE CASTS UR QL AUTO: NORMAL /LPF
IMM GRANULOCYTES # BLD AUTO: 0.03 10*3/MM3 (ref 0–0.05)
IMM GRANULOCYTES NFR BLD AUTO: 0.2 % (ref 0–0.5)
INR PPP: 1 (ref 0.9–1.1)
KETONES UR QL STRIP: ABNORMAL
LEUKOCYTE ESTERASE UR QL STRIP.AUTO: ABNORMAL
LYMPHOCYTES # BLD AUTO: 1.43 10*3/MM3 (ref 0.7–3.1)
LYMPHOCYTES NFR BLD AUTO: 11.8 % (ref 19.6–45.3)
M PNEUMO IGG SER IA-ACNC: NOT DETECTED
MCH RBC QN AUTO: 33.6 PG (ref 26.6–33)
MCHC RBC AUTO-ENTMCNC: 35.3 G/DL (ref 31.5–35.7)
MCV RBC AUTO: 95.1 FL (ref 79–97)
MONOCYTES # BLD AUTO: 1.05 10*3/MM3 (ref 0.1–0.9)
MONOCYTES NFR BLD AUTO: 8.7 % (ref 5–12)
NEUTROPHILS NFR BLD AUTO: 79 % (ref 42.7–76)
NEUTROPHILS NFR BLD AUTO: 9.53 10*3/MM3 (ref 1.7–7)
NITRITE UR QL STRIP: NEGATIVE
NRBC BLD AUTO-RTO: 0 /100 WBC (ref 0–0.2)
PH UR STRIP.AUTO: 5.5 [PH] (ref 5–8)
PLATELET # BLD AUTO: 203 10*3/MM3 (ref 140–450)
PMV BLD AUTO: 9.5 FL (ref 6–12)
POTASSIUM SERPL-SCNC: 4.2 MMOL/L (ref 3.5–5.2)
PROCALCITONIN SERPL-MCNC: 0.09 NG/ML (ref 0–0.25)
PROT SERPL-MCNC: 7.1 G/DL (ref 6–8.5)
PROT UR QL STRIP: ABNORMAL
PROTHROMBIN TIME: 13.3 SECONDS (ref 11.7–14.2)
RBC # BLD AUTO: 5.09 10*6/MM3 (ref 4.14–5.8)
RBC # UR STRIP: NORMAL /HPF
REF LAB TEST METHOD: NORMAL
RHINOVIRUS RNA SPEC NAA+PROBE: NOT DETECTED
RSV RNA NPH QL NAA+NON-PROBE: NOT DETECTED
SARS-COV-2 RNA NPH QL NAA+NON-PROBE: NOT DETECTED
SODIUM SERPL-SCNC: 138 MMOL/L (ref 136–145)
SP GR UR STRIP: >=1.03 (ref 1–1.03)
SQUAMOUS #/AREA URNS HPF: NORMAL /HPF
TROPONIN T DELTA: 7 NG/L
TROPONIN T SERPL HS-MCNC: 16 NG/L
UROBILINOGEN UR QL STRIP: ABNORMAL
WBC # UR STRIP: NORMAL /HPF
WBC NRBC COR # BLD: 12.07 10*3/MM3 (ref 3.4–10.8)

## 2023-05-05 PROCEDURE — 84145 PROCALCITONIN (PCT): CPT | Performed by: EMERGENCY MEDICINE

## 2023-05-05 PROCEDURE — 84484 ASSAY OF TROPONIN QUANT: CPT | Performed by: EMERGENCY MEDICINE

## 2023-05-05 PROCEDURE — 0202U NFCT DS 22 TRGT SARS-COV-2: CPT | Performed by: EMERGENCY MEDICINE

## 2023-05-05 PROCEDURE — 25010000002 MORPHINE PER 10 MG: Performed by: EMERGENCY MEDICINE

## 2023-05-05 PROCEDURE — 87040 BLOOD CULTURE FOR BACTERIA: CPT | Performed by: EMERGENCY MEDICINE

## 2023-05-05 PROCEDURE — 80053 COMPREHEN METABOLIC PANEL: CPT | Performed by: EMERGENCY MEDICINE

## 2023-05-05 PROCEDURE — 25010000002 METHYLPREDNISOLONE PER 125 MG: Performed by: EMERGENCY MEDICINE

## 2023-05-05 PROCEDURE — 85610 PROTHROMBIN TIME: CPT | Performed by: EMERGENCY MEDICINE

## 2023-05-05 PROCEDURE — 94799 UNLISTED PULMONARY SVC/PX: CPT

## 2023-05-05 PROCEDURE — 99285 EMERGENCY DEPT VISIT HI MDM: CPT

## 2023-05-05 PROCEDURE — 94640 AIRWAY INHALATION TREATMENT: CPT

## 2023-05-05 PROCEDURE — 85025 COMPLETE CBC W/AUTO DIFF WBC: CPT | Performed by: EMERGENCY MEDICINE

## 2023-05-05 PROCEDURE — 93010 ELECTROCARDIOGRAM REPORT: CPT | Performed by: INTERNAL MEDICINE

## 2023-05-05 PROCEDURE — 81001 URINALYSIS AUTO W/SCOPE: CPT | Performed by: EMERGENCY MEDICINE

## 2023-05-05 PROCEDURE — 36415 COLL VENOUS BLD VENIPUNCTURE: CPT

## 2023-05-05 PROCEDURE — 93005 ELECTROCARDIOGRAM TRACING: CPT | Performed by: EMERGENCY MEDICINE

## 2023-05-05 PROCEDURE — 94761 N-INVAS EAR/PLS OXIMETRY MLT: CPT

## 2023-05-05 PROCEDURE — 83605 ASSAY OF LACTIC ACID: CPT | Performed by: EMERGENCY MEDICINE

## 2023-05-05 PROCEDURE — 71045 X-RAY EXAM CHEST 1 VIEW: CPT

## 2023-05-05 PROCEDURE — 85730 THROMBOPLASTIN TIME PARTIAL: CPT | Performed by: EMERGENCY MEDICINE

## 2023-05-05 RX ORDER — MORPHINE SULFATE 2 MG/ML
4 INJECTION, SOLUTION INTRAMUSCULAR; INTRAVENOUS EVERY 4 HOURS PRN
Status: COMPLETED | OUTPATIENT
Start: 2023-05-05 | End: 2023-05-08

## 2023-05-05 RX ORDER — HYDROCODONE BITARTRATE AND HOMATROPINE METHYLBROMIDE ORAL SOLUTION 5; 1.5 MG/5ML; MG/5ML
5 LIQUID ORAL EVERY 4 HOURS PRN
Status: DISCONTINUED | OUTPATIENT
Start: 2023-05-05 | End: 2023-05-10 | Stop reason: HOSPADM

## 2023-05-05 RX ORDER — METHYLPREDNISOLONE SODIUM SUCCINATE 40 MG/ML
40 INJECTION, POWDER, LYOPHILIZED, FOR SOLUTION INTRAMUSCULAR; INTRAVENOUS DAILY
Status: DISCONTINUED | OUTPATIENT
Start: 2023-05-06 | End: 2023-05-05

## 2023-05-05 RX ORDER — PREDNISONE 20 MG/1
40 TABLET ORAL
Status: DISCONTINUED | OUTPATIENT
Start: 2023-05-06 | End: 2023-05-07

## 2023-05-05 RX ORDER — ACETAMINOPHEN 325 MG/1
650 TABLET ORAL EVERY 4 HOURS PRN
Status: DISCONTINUED | OUTPATIENT
Start: 2023-05-05 | End: 2023-05-10 | Stop reason: HOSPADM

## 2023-05-05 RX ORDER — METHYLPREDNISOLONE SODIUM SUCCINATE 125 MG/2ML
125 INJECTION, POWDER, LYOPHILIZED, FOR SOLUTION INTRAMUSCULAR; INTRAVENOUS ONCE
Status: DISCONTINUED | OUTPATIENT
Start: 2023-05-05 | End: 2023-05-05

## 2023-05-05 RX ORDER — ACETAMINOPHEN 500 MG
1000 TABLET ORAL ONCE
Status: COMPLETED | OUTPATIENT
Start: 2023-05-05 | End: 2023-05-05

## 2023-05-05 RX ORDER — INSULIN LISPRO 100 [IU]/ML
0-7 INJECTION, SOLUTION INTRAVENOUS; SUBCUTANEOUS
Status: DISCONTINUED | OUTPATIENT
Start: 2023-05-06 | End: 2023-05-10 | Stop reason: HOSPADM

## 2023-05-05 RX ORDER — NICOTINE POLACRILEX 4 MG
15 LOZENGE BUCCAL
Status: DISCONTINUED | OUTPATIENT
Start: 2023-05-05 | End: 2023-05-10 | Stop reason: HOSPADM

## 2023-05-05 RX ORDER — ACETAMINOPHEN 160 MG/5ML
650 SOLUTION ORAL EVERY 4 HOURS PRN
Status: DISCONTINUED | OUTPATIENT
Start: 2023-05-05 | End: 2023-05-10 | Stop reason: HOSPADM

## 2023-05-05 RX ORDER — GUAIFENESIN 600 MG/1
600 TABLET, EXTENDED RELEASE ORAL EVERY 12 HOURS SCHEDULED
Status: DISCONTINUED | OUTPATIENT
Start: 2023-05-05 | End: 2023-05-07

## 2023-05-05 RX ORDER — ALBUTEROL SULFATE 2.5 MG/3ML
2.5 SOLUTION RESPIRATORY (INHALATION) EVERY 6 HOURS PRN
Status: DISCONTINUED | OUTPATIENT
Start: 2023-05-05 | End: 2023-05-10 | Stop reason: HOSPADM

## 2023-05-05 RX ORDER — MORPHINE SULFATE 2 MG/ML
4 INJECTION, SOLUTION INTRAMUSCULAR; INTRAVENOUS ONCE
Status: COMPLETED | OUTPATIENT
Start: 2023-05-05 | End: 2023-05-05

## 2023-05-05 RX ORDER — METHYLPREDNISOLONE SODIUM SUCCINATE 125 MG/2ML
125 INJECTION, POWDER, LYOPHILIZED, FOR SOLUTION INTRAMUSCULAR; INTRAVENOUS ONCE
Status: COMPLETED | OUTPATIENT
Start: 2023-05-05 | End: 2023-05-05

## 2023-05-05 RX ORDER — ACETAMINOPHEN 650 MG/1
650 SUPPOSITORY RECTAL EVERY 4 HOURS PRN
Status: DISCONTINUED | OUTPATIENT
Start: 2023-05-05 | End: 2023-05-10 | Stop reason: HOSPADM

## 2023-05-05 RX ORDER — IPRATROPIUM BROMIDE AND ALBUTEROL SULFATE 2.5; .5 MG/3ML; MG/3ML
3 SOLUTION RESPIRATORY (INHALATION) ONCE
Status: COMPLETED | OUTPATIENT
Start: 2023-05-05 | End: 2023-05-05

## 2023-05-05 RX ORDER — BENZONATATE 100 MG/1
200 CAPSULE ORAL ONCE
Status: COMPLETED | OUTPATIENT
Start: 2023-05-05 | End: 2023-05-05

## 2023-05-05 RX ORDER — DEXTROSE MONOHYDRATE 25 G/50ML
25 INJECTION, SOLUTION INTRAVENOUS
Status: DISCONTINUED | OUTPATIENT
Start: 2023-05-05 | End: 2023-05-10 | Stop reason: HOSPADM

## 2023-05-05 RX ORDER — IPRATROPIUM BROMIDE AND ALBUTEROL SULFATE 2.5; .5 MG/3ML; MG/3ML
3 SOLUTION RESPIRATORY (INHALATION)
Status: DISCONTINUED | OUTPATIENT
Start: 2023-05-06 | End: 2023-05-10 | Stop reason: HOSPADM

## 2023-05-05 RX ORDER — MONTELUKAST SODIUM 10 MG/1
10 TABLET ORAL NIGHTLY
COMMUNITY

## 2023-05-05 RX ORDER — ONDANSETRON 2 MG/ML
4 INJECTION INTRAMUSCULAR; INTRAVENOUS EVERY 6 HOURS PRN
Status: DISCONTINUED | OUTPATIENT
Start: 2023-05-05 | End: 2023-05-10 | Stop reason: HOSPADM

## 2023-05-05 RX ORDER — SODIUM CHLORIDE 0.9 % (FLUSH) 0.9 %
10 SYRINGE (ML) INJECTION AS NEEDED
Status: DISCONTINUED | OUTPATIENT
Start: 2023-05-05 | End: 2023-05-10 | Stop reason: HOSPADM

## 2023-05-05 RX ORDER — IBUPROFEN 600 MG/1
1 TABLET ORAL
Status: DISCONTINUED | OUTPATIENT
Start: 2023-05-05 | End: 2023-05-10 | Stop reason: HOSPADM

## 2023-05-05 RX ORDER — NITROGLYCERIN 0.4 MG/1
0.4 TABLET SUBLINGUAL
Status: DISCONTINUED | OUTPATIENT
Start: 2023-05-05 | End: 2023-05-10 | Stop reason: HOSPADM

## 2023-05-05 RX ORDER — IPRATROPIUM BROMIDE AND ALBUTEROL SULFATE 2.5; .5 MG/3ML; MG/3ML
3 SOLUTION RESPIRATORY (INHALATION) EVERY 4 HOURS PRN
Status: DISCONTINUED | OUTPATIENT
Start: 2023-05-05 | End: 2023-05-05

## 2023-05-05 RX ORDER — ONDANSETRON 4 MG/1
4 TABLET, FILM COATED ORAL EVERY 6 HOURS PRN
Status: DISCONTINUED | OUTPATIENT
Start: 2023-05-05 | End: 2023-05-10 | Stop reason: HOSPADM

## 2023-05-05 RX ADMIN — SODIUM CHLORIDE 500 ML: 9 INJECTION, SOLUTION INTRAVENOUS at 19:05

## 2023-05-05 RX ADMIN — HYDROCODONE BITARTRATE AND HOMATROPINE METHYLBROMIDE 5 ML: 1.5; 5 SYRUP ORAL at 23:36

## 2023-05-05 RX ADMIN — MORPHINE SULFATE 4 MG: 2 INJECTION, SOLUTION INTRAMUSCULAR; INTRAVENOUS at 20:46

## 2023-05-05 RX ADMIN — METHYLPREDNISOLONE SODIUM SUCCINATE 125 MG: 125 INJECTION, POWDER, FOR SOLUTION INTRAMUSCULAR; INTRAVENOUS at 20:45

## 2023-05-05 RX ADMIN — ACETAMINOPHEN 1000 MG: 500 TABLET ORAL at 19:04

## 2023-05-05 RX ADMIN — IPRATROPIUM BROMIDE AND ALBUTEROL SULFATE 3 ML: .5; 3 SOLUTION RESPIRATORY (INHALATION) at 18:46

## 2023-05-05 RX ADMIN — BENZONATATE 200 MG: 100 CAPSULE ORAL at 20:49

## 2023-05-05 NOTE — ED NOTES
Patient to ER via car from home for cough with SOA patient was recently dx with sinus infection and been taking medication  Patient reports not getting better  patient has COPD uses oxygen at night

## 2023-05-05 NOTE — ED PROVIDER NOTES
EMERGENCY DEPARTMENT ENCOUNTER    Room Number:  11/11  Date of encounter:  5/5/2023  PCP: Lily Porter MD  Historian: Patient and family who are at bedside    Patient was placed in face mask during triage process. Patient was wearing facemask when I entered the room and throughout our encounter. I wore full protective equipment throughout this patient encounter including a face mask, eye protection, and gloves. Hand hygiene was performed before donning protective equipment and again following doffing of PPE after leaving the room.    HPI:  Chief Complaint: Cough and fever with associated dyspnea  A complete HPI/ROS/PMH/PSH/SH/FH are unobtainable due to: N/A   Context: Db Hardin is a 72 y.o. male with a history of COPD and CAD who does have supplemental oxygen to use at night but does not generally require it during the day presents complaining of increasing shortness of breath with persistent cough and fevers increasing over the last 5 days not responsive to oral steroids and antibiotics provided by his PCP.  No nausea vomiting diarrhea black or bloody stools reported.  His abdomen has become sore with all the coughing but denies significant pain.  No reported hemoptysis.      MEDICAL HISTORY REVIEW  EMR reviewed:    Discharge summary 7/17/2020 by Dr. Canales reviewed: Patient admitted for vertigo and dehydration    PAST MEDICAL HISTORY  Active Ambulatory Problems     Diagnosis Date Noted   • S/P coronary artery stent placement 05/06/2016   • Diabetes mellitus without complication 05/06/2016   • Hypertension 05/06/2016   • DESI (acute kidney injury) 02/10/2017   • CAD (coronary artery disease) 02/10/2017   • Disorder of joint 11/24/2015   • Chronic obstructive pulmonary disease 06/02/2014   • Hyperlipidemia 06/02/2014   • Knee osteoarthritis 05/03/2016   • Chest pain 05/17/2017   • Syncope and collapse 06/02/2017   • Hyperthyroidism 06/04/2017   • Status post left knee replacement 10/16/2017   • Left knee  pain 2017   • Vertigo 2020   • Dehydration 2020   • Dyspnea on exertion 2020     Resolved Ambulatory Problems     Diagnosis Date Noted   • ST elevation myocardial infarction involving left anterior descending (LAD) coronary artery 2016   • Influenza A 02/10/2017   • Periapical abscess 2017   • Fever 2017     Past Medical History:   Diagnosis Date   • COPD (chronic obstructive pulmonary disease)    • Diabetes    • Disease of thyroid gland    • Elevated cholesterol    • Myocardial infarction 2011   • Sinus bradycardia          PAST SURGICAL HISTORY  Past Surgical History:   Procedure Laterality Date   • CARDIAC CATHETERIZATION      :3.0x15mm Vision pLAD; 3.5x20mm Vision p circ; 3.5x28mm Vision to mRCA   • CARPAL TUNNEL RELEASE Left    • CORONARY ANGIOPLASTY     • CORONARY STENT PLACEMENT      :3.0x15mm Vision pLAD; 3.5x20mm Vision p circ; 3.5x28mm Vision to mRCA   • JOINT REPLACEMENT           FAMILY HISTORY  Family History   Problem Relation Age of Onset   • No Known Problems Mother    • Cancer Father         lung cancer (smoker)    • No Known Problems Sister    • No Known Problems Brother    • No Known Problems Maternal Grandmother    • No Known Problems Maternal Grandfather    • No Known Problems Paternal Grandmother    • No Known Problems Paternal Grandfather    • No Known Problems Brother    • No Known Problems Sister    • Brain cancer Brother    • Heart attack Brother          SOCIAL HISTORY  Social History     Socioeconomic History   • Marital status:    Tobacco Use   • Smoking status: Former     Packs/day: 2.00     Years: 30.00     Pack years: 60.00     Types: Cigarettes     Quit date: 2011     Years since quittin.8   • Smokeless tobacco: Never   • Tobacco comments:     caffeine use - coffee on occas.    Substance and Sexual Activity   • Alcohol use: Yes     Alcohol/week: 1.0 standard drink     Types: 1 Cans of beer per week     Comment: beer  --occ   • Drug use: No   • Sexual activity: Defer         ALLERGIES  Patient has no known allergies.        REVIEW OF SYSTEMS  Review of Systems     All systems reviewed and negative except for those discussed in HPI.       PHYSICAL EXAM    I have reviewed the triage vital signs and nursing notes.    ED Triage Vitals [05/05/23 1744]   Temp Heart Rate Resp BP SpO2   (!) 101.5 °F (38.6 °C) 113 28 -- (!) 87 %      Temp src Heart Rate Source Patient Position BP Location FiO2 (%)   -- -- -- -- --       Physical Exam    Physical Exam   Constitutional: Mild acute tachypneic with frequent coughing.  Ill-appearing though not overtly toxic.  HENT:  Head: Normocephalic and atraumatic.   Oropharynx: Mucous membranes are moist.   Eyes: No scleral icterus. No conjunctival pallor.  Neck: Painless range of motion noted. Neck supple.   Cardiovascular: Normal rate, regular rhythm and intact distal pulses.  Pulmonary/Chest: Mild tachypnea.  Diminished throughout with expiratory wheezing heard throughout.    Abdominal: Soft. There is no tenderness. There is no rebound and no guarding.   Musculoskeletal: Moves all extremities equally. There is no pedal edema or calf tenderness.   Neurological: Alert.  Baseline strength and sensation noted.   Skin: Skin is pink, warm, and dry. No pallor.   Psychiatric: Mood and affect normal.   Nursing note and vitals reviewed.    LAB RESULTS  Recent Results (from the past 24 hour(s))   Comprehensive Metabolic Panel    Collection Time: 05/05/23  6:17 PM    Specimen: Blood   Result Value Ref Range    Glucose 111 (H) 65 - 99 mg/dL    BUN 34 (H) 8 - 23 mg/dL    Creatinine 1.18 0.76 - 1.27 mg/dL    Sodium 138 136 - 145 mmol/L    Potassium 4.2 3.5 - 5.2 mmol/L    Chloride 99 98 - 107 mmol/L    CO2 30.0 (H) 22.0 - 29.0 mmol/L    Calcium 9.0 8.6 - 10.5 mg/dL    Total Protein 7.1 6.0 - 8.5 g/dL    Albumin 4.7 3.5 - 5.2 g/dL    ALT (SGPT) 21 1 - 41 U/L    AST (SGOT) 23 1 - 40 U/L    Alkaline Phosphatase 104 39 -  117 U/L    Total Bilirubin 1.3 (H) 0.0 - 1.2 mg/dL    Globulin 2.4 gm/dL    A/G Ratio 2.0 g/dL    BUN/Creatinine Ratio 28.8 (H) 7.0 - 25.0    Anion Gap 9.0 5.0 - 15.0 mmol/L    eGFR 64.8 >60.0 mL/min/1.73   Protime-INR    Collection Time: 05/05/23  6:17 PM    Specimen: Blood   Result Value Ref Range    Protime 13.3 11.7 - 14.2 Seconds    INR 1.00 0.90 - 1.10   aPTT    Collection Time: 05/05/23  6:17 PM    Specimen: Blood   Result Value Ref Range    PTT 24.1 22.7 - 35.4 seconds   High Sensitivity Troponin T    Collection Time: 05/05/23  6:17 PM    Specimen: Blood   Result Value Ref Range    HS Troponin T 16 (H) <15 ng/L   Lactic Acid, Plasma    Collection Time: 05/05/23  6:17 PM    Specimen: Blood   Result Value Ref Range    Lactate 1.9 0.5 - 2.0 mmol/L   CBC Auto Differential    Collection Time: 05/05/23  6:17 PM    Specimen: Blood   Result Value Ref Range    WBC 12.07 (H) 3.40 - 10.80 10*3/mm3    RBC 5.09 4.14 - 5.80 10*6/mm3    Hemoglobin 17.1 13.0 - 17.7 g/dL    Hematocrit 48.4 37.5 - 51.0 %    MCV 95.1 79.0 - 97.0 fL    MCH 33.6 (H) 26.6 - 33.0 pg    MCHC 35.3 31.5 - 35.7 g/dL    RDW 13.4 12.3 - 15.4 %    RDW-SD 46.4 37.0 - 54.0 fl    MPV 9.5 6.0 - 12.0 fL    Platelets 203 140 - 450 10*3/mm3    Neutrophil % 79.0 (H) 42.7 - 76.0 %    Lymphocyte % 11.8 (L) 19.6 - 45.3 %    Monocyte % 8.7 5.0 - 12.0 %    Eosinophil % 0.1 (L) 0.3 - 6.2 %    Basophil % 0.2 0.0 - 1.5 %    Immature Grans % 0.2 0.0 - 0.5 %    Neutrophils, Absolute 9.53 (H) 1.70 - 7.00 10*3/mm3    Lymphocytes, Absolute 1.43 0.70 - 3.10 10*3/mm3    Monocytes, Absolute 1.05 (H) 0.10 - 0.90 10*3/mm3    Eosinophils, Absolute 0.01 0.00 - 0.40 10*3/mm3    Basophils, Absolute 0.02 0.00 - 0.20 10*3/mm3    Immature Grans, Absolute 0.03 0.00 - 0.05 10*3/mm3    nRBC 0.0 0.0 - 0.2 /100 WBC   Procalcitonin    Collection Time: 05/05/23  6:17 PM    Specimen: Blood   Result Value Ref Range    Procalcitonin 0.09 0.00 - 0.25 ng/mL   Respiratory Panel PCR  w/COVID-19(SARS-CoV-2) SUSU/LISA/MIGUEL ÁNGEL/PAD/COR/MAD/HUY In-House, NP Swab in UTM/VTM, 3-4 HR TAT - Swab, Nasopharynx    Collection Time: 05/05/23  6:19 PM    Specimen: Nasopharynx; Swab   Result Value Ref Range    ADENOVIRUS, PCR Not Detected Not Detected    Coronavirus 229E Not Detected Not Detected    Coronavirus HKU1 Not Detected Not Detected    Coronavirus NL63 Not Detected Not Detected    Coronavirus OC43 Not Detected Not Detected    COVID19 Not Detected Not Detected - Ref. Range    Human Metapneumovirus Not Detected Not Detected    Human Rhinovirus/Enterovirus Not Detected Not Detected    Influenza A PCR Not Detected Not Detected    Influenza B PCR Not Detected Not Detected    Parainfluenza Virus 1 Not Detected Not Detected    Parainfluenza Virus 2 Not Detected Not Detected    Parainfluenza Virus 3 Detected (A) Not Detected    Parainfluenza Virus 4 Not Detected Not Detected    RSV, PCR Not Detected Not Detected    Bordetella pertussis pcr Not Detected Not Detected    Bordetella parapertussis PCR Not Detected Not Detected    Chlamydophila pneumoniae PCR Not Detected Not Detected    Mycoplasma pneumo by PCR Not Detected Not Detected   Urinalysis With Culture If Indicated - Urine, Clean Catch    Collection Time: 05/05/23  6:19 PM    Specimen: Urine, Clean Catch   Result Value Ref Range    Color, UA Dark Yellow (A) Yellow, Straw    Appearance, UA Clear Clear    pH, UA 5.5 5.0 - 8.0    Specific Gravity, UA >=1.030 1.005 - 1.030    Glucose, UA Negative Negative    Ketones, UA Trace (A) Negative    Bilirubin, UA Negative Negative    Blood, UA Negative Negative    Protein, UA 30 mg/dL (1+) (A) Negative    Leuk Esterase, UA Trace (A) Negative    Nitrite, UA Negative Negative    Urobilinogen, UA 1.0 E.U./dL 0.2 - 1.0 E.U./dL   Urinalysis, Microscopic Only - Urine, Clean Catch    Collection Time: 05/05/23  6:19 PM    Specimen: Urine, Clean Catch   Result Value Ref Range    RBC, UA 0-2 None Seen, 0-2 /HPF    WBC, UA 0-2 None  Seen, 0-2 /HPF    Bacteria, UA None Seen None Seen /HPF    Squamous Epithelial Cells, UA 0-2 None Seen, 0-2 /HPF    Hyaline Casts, UA 7-12 None Seen /LPF    Methodology Automated Microscopy    ECG 12 Lead Dyspnea    Collection Time: 05/05/23  6:37 PM   Result Value Ref Range    QT Interval 325 ms       Ordered the above labs and independently reviewed the results.        RADIOLOGY  XR Chest 1 View    Result Date: 5/5/2023  XR CHEST 1 VW-  HISTORY: Male who is 72 years-old,  cough  TECHNIQUE: Frontal views of the chest  COMPARISON: 8/27/2020  FINDINGS: Heart, mediastinum and pulmonary vasculature are unremarkable. No focal pulmonary consolidation, pleural effusion, or pneumothorax. ON old granulomatous disease is seen. No acute osseous process.      No focal pulmonary consolidation. Follow-up as clinical indications persist.  This report was finalized on 5/5/2023 7:04 PM by Dr. Andrei Dumont M.D.        I ordered the above noted radiological studies. Reviewed by me and discussed with radiologist.  See dictation for official radiology interpretation.      PROCEDURES    Procedures        MEDICATIONS GIVEN IN ER    Medications   sodium chloride 0.9 % flush 10 mL (has no administration in time range)   ipratropium-albuterol (DUO-NEB) nebulizer solution 3 mL (3 mL Nebulization Given 5/5/23 1846)   acetaminophen (TYLENOL) tablet 1,000 mg (1,000 mg Oral Given 5/5/23 1904)   sodium chloride 0.9 % bolus 500 mL (0 mL Intravenous Stopped 5/5/23 2015)   benzonatate (TESSALON) capsule 200 mg (200 mg Oral Given 5/5/23 2049)   morphine injection 4 mg (4 mg Intravenous Given 5/5/23 2046)   methylPREDNISolone sodium succinate (SOLU-Medrol) injection 125 mg (125 mg Intravenous Given 5/5/23 2045)         PROGRESS, DATA ANALYSIS, CONSULTS, AND MEDICAL DECISION MAKING    My differential diagnosis for cough includes but is not limited to:  Upper respiratory infection, bronchitis, pneumonia, COPD exacerbation, cough variant asthma,  cardiac asthma, coronary artery disease, congestive heart failure, bacterial, viral or lung infections, lung cancer, aspiration pneumonitis, aspiration of foreign body and Covid -19    Total critical care time: Approximately 35 minutes    Due to a high probability of clinically significant, life threatening deterioration, the patient required my highest level of preparedness to intervene emergently and I personally spent this critical care time directly and personally managing the patient. This critical care time included obtaining a history; examining the patient; vital sign monitoring; ordering and review of studies; arranging urgent treatment with development of a management plan; evaluation of patient's response to treatment; frequent reassessment; and, discussions with other providers.    This critical care time was performed to assess and manage the high probability of imminent, life-threatening deterioration that could result in multi-organ failure. It was exclusive of separately billable procedures and treating other patients and teaching time.    Please see MDM section and the rest of the note for further information on patient assessment and treatment.      All labs have been independently reviewed by me.  All radiology studies have been reviewed by me and discussed with radiologist dictating the report.   EKG's independently viewed and interpreted by me.  Discussion below represents my analysis of pertinent findings related to patient's condition, differential diagnosis, treatment plan and final disposition.      ED Course as of 05/05/23 2105   Fri May 05, 2023   1849 EKG           EKG time: 1837  Rhythm/Rate: Sinus, 85  P waves and ND: ALEYDA within normal limits  QRS, axis: Narrow complex with slow R wave progression  ST and T waves: No STEMI; QTc within normal limits    Interpreted Contemporaneously by me, independently viewed  Comparison: 6/3/2017-no acute change   [RS]   1851 WBC, UA: 0-2 [RS]   1851  Bacteria, UA: None Seen [RS]   1851 Specific Gravity, UA: >=1.030  IV fluids infusing [RS]   1851 WBC(!): 12.07 [RS]   1851 Hemoglobin: 17.1 [RS]   1851 Immature Grans, Absolute: 0.03 [RS]   1851 RADIOLOGY      Study: Single view portable chest  Findings: No pneumothorax or large focal infiltrate appreciated  I independently viewed and interpreted these images contemporaneously with treatment.    [RS]   1905 Glucose(!): 111 [RS]   1905 BUN(!): 34 [RS]   1905 Creatinine: 1.18 [RS]   1905 Sodium: 138 [RS]   1905 Potassium: 4.2 [RS]   1905 Lactate: 1.9 [RS]   1905 HS Troponin T(!): 16 [RS]   1905 ALT (SGPT): 21 [RS]   1905 AST (SGOT): 23 [RS]   2011 Parainfluenza Virus 3(!): Detected [RS]   2013 Patient updated with findings and plan for admission.  Hesitantly agreeable. [RS]   2017 CONSULT        Provider: Carmen ValenzuelaGORDY    Discussion: Reviewed patient history, ED presentation and evaluation as well as therapies initiated in the ED.  Agreeable to accept patient for full admission with telemetry for further evaluation and treatment on behalf of Dr. Iqbal.    Agreeable c treatment and planned disposition.         [RS]      ED Course User Index  [RS] Tyshawn Quintana MD       AS OF 21:05 EDT VITALS:    BP - 133/98  HR - 92  TEMP - (!) 101.5 °F (38.6 °C)  O2 SATS - 93%        DIAGNOSIS  Final diagnoses:   Acute hypoxemic respiratory failure   Fever in adult   Parainfluenza infection   Chronic obstructive pulmonary disease, unspecified COPD type         DISPOSITION  ADMISSION    Discussed treatment plan and reason for admission with pt/family and admitting physician.  Pt/family voiced understanding of the plan for admission for further testing/treatment as needed.          Tyshawn Quintana MD  05/05/23 9768

## 2023-05-06 PROBLEM — R79.89 ELEVATED TROPONIN: Status: ACTIVE | Noted: 2023-05-06

## 2023-05-06 PROBLEM — R77.8 ELEVATED TROPONIN: Status: ACTIVE | Noted: 2023-05-06

## 2023-05-06 LAB
ANION GAP SERPL CALCULATED.3IONS-SCNC: 9.7 MMOL/L (ref 5–15)
ARTERIAL PATENCY WRIST A: POSITIVE
ATMOSPHERIC PRESS: 754.2 MMHG
BASE EXCESS BLDA CALC-SCNC: 0.2 MMOL/L (ref 0–2)
BASOPHILS # BLD AUTO: 0.01 10*3/MM3 (ref 0–0.2)
BASOPHILS NFR BLD AUTO: 0.1 % (ref 0–1.5)
BDY SITE: ABNORMAL
BUN SERPL-MCNC: 32 MG/DL (ref 8–23)
BUN/CREAT SERPL: 31.4 (ref 7–25)
CALCIUM SPEC-SCNC: 8.8 MG/DL (ref 8.6–10.5)
CHLORIDE SERPL-SCNC: 100 MMOL/L (ref 98–107)
CO2 SERPL-SCNC: 23.3 MMOL/L (ref 22–29)
CREAT SERPL-MCNC: 1.02 MG/DL (ref 0.76–1.27)
DEPRECATED RDW RBC AUTO: 47.3 FL (ref 37–54)
EGFRCR SERPLBLD CKD-EPI 2021: 77.1 ML/MIN/1.73
EOSINOPHIL # BLD AUTO: 0 10*3/MM3 (ref 0–0.4)
EOSINOPHIL NFR BLD AUTO: 0 % (ref 0.3–6.2)
ERYTHROCYTE [DISTWIDTH] IN BLOOD BY AUTOMATED COUNT: 13.3 % (ref 12.3–15.4)
GAS FLOW AIRWAY: 2 LPM
GLUCOSE BLDC GLUCOMTR-MCNC: 135 MG/DL (ref 70–130)
GLUCOSE BLDC GLUCOMTR-MCNC: 160 MG/DL (ref 70–130)
GLUCOSE BLDC GLUCOMTR-MCNC: 205 MG/DL (ref 70–130)
GLUCOSE BLDC GLUCOMTR-MCNC: 219 MG/DL (ref 70–130)
GLUCOSE SERPL-MCNC: 179 MG/DL (ref 65–99)
HBA1C MFR BLD: 5.7 % (ref 4.8–5.6)
HCO3 BLDA-SCNC: 25.5 MMOL/L (ref 22–28)
HCT VFR BLD AUTO: 41.9 % (ref 37.5–51)
HGB BLD-MCNC: 14.8 G/DL (ref 13–17.7)
IMM GRANULOCYTES # BLD AUTO: 0.02 10*3/MM3 (ref 0–0.05)
IMM GRANULOCYTES NFR BLD AUTO: 0.2 % (ref 0–0.5)
LYMPHOCYTES # BLD AUTO: 0.64 10*3/MM3 (ref 0.7–3.1)
LYMPHOCYTES NFR BLD AUTO: 5.7 % (ref 19.6–45.3)
MCH RBC QN AUTO: 34 PG (ref 26.6–33)
MCHC RBC AUTO-ENTMCNC: 35.3 G/DL (ref 31.5–35.7)
MCV RBC AUTO: 96.3 FL (ref 79–97)
MODALITY: ABNORMAL
MONOCYTES # BLD AUTO: 0.25 10*3/MM3 (ref 0.1–0.9)
MONOCYTES NFR BLD AUTO: 2.2 % (ref 5–12)
NEUTROPHILS NFR BLD AUTO: 10.37 10*3/MM3 (ref 1.7–7)
NEUTROPHILS NFR BLD AUTO: 91.8 % (ref 42.7–76)
NRBC BLD AUTO-RTO: 0 /100 WBC (ref 0–0.2)
PCO2 BLDA: 42.8 MM HG (ref 35–45)
PH BLDA: 7.38 PH UNITS (ref 7.35–7.45)
PLATELET # BLD AUTO: 162 10*3/MM3 (ref 140–450)
PMV BLD AUTO: 9.6 FL (ref 6–12)
PO2 BLDA: 74 MM HG (ref 80–100)
POTASSIUM SERPL-SCNC: 4.4 MMOL/L (ref 3.5–5.2)
QT INTERVAL: 325 MS
RBC # BLD AUTO: 4.35 10*6/MM3 (ref 4.14–5.8)
SAO2 % BLDCOA: 94.4 % (ref 92–99)
SODIUM SERPL-SCNC: 133 MMOL/L (ref 136–145)
TOTAL RATE: 18 BREATHS/MINUTE
TROPONIN T SERPL HS-MCNC: 9 NG/L
WBC NRBC COR # BLD: 11.29 10*3/MM3 (ref 3.4–10.8)

## 2023-05-06 PROCEDURE — 82948 REAGENT STRIP/BLOOD GLUCOSE: CPT

## 2023-05-06 PROCEDURE — 94761 N-INVAS EAR/PLS OXIMETRY MLT: CPT

## 2023-05-06 PROCEDURE — 84484 ASSAY OF TROPONIN QUANT: CPT | Performed by: STUDENT IN AN ORGANIZED HEALTH CARE EDUCATION/TRAINING PROGRAM

## 2023-05-06 PROCEDURE — 94664 DEMO&/EVAL PT USE INHALER: CPT

## 2023-05-06 PROCEDURE — 94799 UNLISTED PULMONARY SVC/PX: CPT

## 2023-05-06 PROCEDURE — 80048 BASIC METABOLIC PNL TOTAL CA: CPT | Performed by: STUDENT IN AN ORGANIZED HEALTH CARE EDUCATION/TRAINING PROGRAM

## 2023-05-06 PROCEDURE — 82803 BLOOD GASES ANY COMBINATION: CPT

## 2023-05-06 PROCEDURE — 83036 HEMOGLOBIN GLYCOSYLATED A1C: CPT | Performed by: NURSE PRACTITIONER

## 2023-05-06 PROCEDURE — 97161 PT EVAL LOW COMPLEX 20 MIN: CPT

## 2023-05-06 PROCEDURE — 63710000001 INSULIN LISPRO (HUMAN) PER 5 UNITS: Performed by: NURSE PRACTITIONER

## 2023-05-06 PROCEDURE — 63710000001 PREDNISONE PER 1 MG: Performed by: NURSE PRACTITIONER

## 2023-05-06 PROCEDURE — 25010000002 MORPHINE PER 10 MG: Performed by: NURSE PRACTITIONER

## 2023-05-06 PROCEDURE — 36600 WITHDRAWAL OF ARTERIAL BLOOD: CPT

## 2023-05-06 PROCEDURE — 85025 COMPLETE CBC W/AUTO DIFF WBC: CPT | Performed by: STUDENT IN AN ORGANIZED HEALTH CARE EDUCATION/TRAINING PROGRAM

## 2023-05-06 PROCEDURE — 97116 GAIT TRAINING THERAPY: CPT

## 2023-05-06 PROCEDURE — 99222 1ST HOSP IP/OBS MODERATE 55: CPT | Performed by: INTERNAL MEDICINE

## 2023-05-06 RX ORDER — LEVOTHYROXINE SODIUM 0.07 MG/1
75 TABLET ORAL DAILY
Status: DISCONTINUED | OUTPATIENT
Start: 2023-05-06 | End: 2023-05-10 | Stop reason: HOSPADM

## 2023-05-06 RX ORDER — ECHINACEA PURPUREA EXTRACT 125 MG
2 TABLET ORAL AS NEEDED
Status: DISCONTINUED | OUTPATIENT
Start: 2023-05-06 | End: 2023-05-10 | Stop reason: HOSPADM

## 2023-05-06 RX ORDER — BUDESONIDE 0.5 MG/2ML
0.5 INHALANT ORAL DAILY
Status: DISCONTINUED | OUTPATIENT
Start: 2023-05-06 | End: 2023-05-08

## 2023-05-06 RX ORDER — ENOXAPARIN SODIUM 100 MG/ML
40 INJECTION SUBCUTANEOUS EVERY 24 HOURS
Status: DISCONTINUED | OUTPATIENT
Start: 2023-05-06 | End: 2023-05-10 | Stop reason: HOSPADM

## 2023-05-06 RX ORDER — MONTELUKAST SODIUM 10 MG/1
10 TABLET ORAL NIGHTLY
Status: DISCONTINUED | OUTPATIENT
Start: 2023-05-06 | End: 2023-05-08

## 2023-05-06 RX ORDER — CHOLECALCIFEROL (VITAMIN D3) 125 MCG
5 CAPSULE ORAL NIGHTLY PRN
Status: DISCONTINUED | OUTPATIENT
Start: 2023-05-06 | End: 2023-05-10 | Stop reason: HOSPADM

## 2023-05-06 RX ORDER — PRAVASTATIN SODIUM 40 MG
40 TABLET ORAL DAILY
Status: DISCONTINUED | OUTPATIENT
Start: 2023-05-06 | End: 2023-05-10 | Stop reason: HOSPADM

## 2023-05-06 RX ORDER — ASPIRIN 81 MG/1
81 TABLET ORAL ONCE
Status: DISCONTINUED | OUTPATIENT
Start: 2023-05-06 | End: 2023-05-10 | Stop reason: HOSPADM

## 2023-05-06 RX ORDER — TAMSULOSIN HYDROCHLORIDE 0.4 MG/1
0.4 CAPSULE ORAL DAILY
Status: DISCONTINUED | OUTPATIENT
Start: 2023-05-06 | End: 2023-05-10 | Stop reason: HOSPADM

## 2023-05-06 RX ORDER — ARFORMOTEROL TARTRATE 15 UG/2ML
15 SOLUTION RESPIRATORY (INHALATION)
Status: DISCONTINUED | OUTPATIENT
Start: 2023-05-06 | End: 2023-05-10 | Stop reason: HOSPADM

## 2023-05-06 RX ADMIN — MORPHINE SULFATE 4 MG: 2 INJECTION, SOLUTION INTRAMUSCULAR; INTRAVENOUS at 20:47

## 2023-05-06 RX ADMIN — PRAVASTATIN SODIUM 40 MG: 40 TABLET ORAL at 09:03

## 2023-05-06 RX ADMIN — Medication 2 SPRAY: at 22:26

## 2023-05-06 RX ADMIN — TAMSULOSIN HYDROCHLORIDE 0.4 MG: 0.4 CAPSULE ORAL at 09:02

## 2023-05-06 RX ADMIN — GUAIFENESIN 600 MG: 600 TABLET, EXTENDED RELEASE ORAL at 02:03

## 2023-05-06 RX ADMIN — IPRATROPIUM BROMIDE AND ALBUTEROL SULFATE 3 ML: .5; 3 SOLUTION RESPIRATORY (INHALATION) at 00:01

## 2023-05-06 RX ADMIN — BUDESONIDE 0.5 MG: 0.5 INHALANT ORAL at 06:47

## 2023-05-06 RX ADMIN — ARFORMOTEROL TARTRATE 15 MCG: 15 SOLUTION RESPIRATORY (INHALATION) at 06:47

## 2023-05-06 RX ADMIN — HYDROCODONE BITARTRATE AND HOMATROPINE METHYLBROMIDE 5 ML: 1.5; 5 SYRUP ORAL at 22:26

## 2023-05-06 RX ADMIN — Medication 5 MG: at 22:26

## 2023-05-06 RX ADMIN — MONTELUKAST SODIUM 10 MG: 10 TABLET, FILM COATED ORAL at 20:50

## 2023-05-06 RX ADMIN — GUAIFENESIN 600 MG: 600 TABLET, EXTENDED RELEASE ORAL at 09:02

## 2023-05-06 RX ADMIN — IPRATROPIUM BROMIDE AND ALBUTEROL SULFATE 3 ML: .5; 3 SOLUTION RESPIRATORY (INHALATION) at 09:22

## 2023-05-06 RX ADMIN — PREDNISONE 40 MG: 20 TABLET ORAL at 09:02

## 2023-05-06 RX ADMIN — ARFORMOTEROL TARTRATE 15 MCG: 15 SOLUTION RESPIRATORY (INHALATION) at 19:19

## 2023-05-06 RX ADMIN — IPRATROPIUM BROMIDE AND ALBUTEROL SULFATE 3 ML: .5; 3 SOLUTION RESPIRATORY (INHALATION) at 14:16

## 2023-05-06 RX ADMIN — ARFORMOTEROL TARTRATE 15 MCG: 15 SOLUTION RESPIRATORY (INHALATION) at 03:43

## 2023-05-06 RX ADMIN — GUAIFENESIN 600 MG: 600 TABLET, EXTENDED RELEASE ORAL at 20:50

## 2023-05-06 RX ADMIN — INSULIN LISPRO 3 UNITS: 100 INJECTION, SOLUTION INTRAVENOUS; SUBCUTANEOUS at 11:28

## 2023-05-06 RX ADMIN — LEVOTHYROXINE SODIUM 75 MCG: 0.07 TABLET ORAL at 09:02

## 2023-05-06 NOTE — PROGRESS NOTES
"[facility] Services: Enoxaparin Consult    Db Hardin has a pharmacy consult to dose prophylactic enoxaparin per Dr. Pond's request.     Indication: VTE Prophylaxis      Relevant clinical data and objective history reviewed:  72 y.o. male 168.9 cm (66.5\") 69.4 kg (153 lb)   Body mass index is 24.32 kg/m².   Results from last 7 days   Lab Units 05/06/23  0625   PLATELETS 10*3/mm3 162     Estimated Creatinine Clearance: 62.4 mL/min (by C-G formula based on SCr of 1.02 mg/dL).    Assessment/Plan    Will start patient on 40mg subcutaneous every 24 hours, adjusted for renal function. Consult order will be discontinued but pharmacy will continue to follow.     Cadence Lopez, [facility]  Clinical Pharmacist    "

## 2023-05-06 NOTE — PLAN OF CARE
Goal Outcome Evaluation:  Plan of Care Reviewed With: patient        Progress: no change  Outcome Evaluation: vss. telemetry monitor, sr/ sb. alert and oriented x4, 2 lpm via nc. up with assist, falls precautions. ac/hs. refused lovenox.

## 2023-05-06 NOTE — THERAPY EVALUATION
Patient Name: Db Hardin  : 1950    MRN: [id number]                              Today's Date: 2023       Admit Date: 2023    Visit Dx:     ICD-10-CM ICD-9-CM   1. Acute hypoxemic respiratory failure  J96.01 518.81   2. Fever in adult  R50.9 780.60   3. Parainfluenza infection  B34.8 078.89   4. Chronic obstructive pulmonary disease, unspecified COPD type  J44.9 496     Patient Active Problem List   Diagnosis   • S/P coronary artery stent placement   • Type 2 diabetes mellitus   • Hypertension   • DESI (acute kidney injury)   • CAD (coronary artery disease)   • Disorder of joint   • Chronic obstructive pulmonary disease   • Hyperlipidemia   • Knee osteoarthritis   • Chest pain   • Syncope and collapse   • Hyperthyroidism   • Status post left knee replacement   • Left knee pain   • Vertigo   • Dehydration   • Dyspnea on exertion   • Acute hypoxemic respiratory failure   • COPD exacerbation   • Acute on chronic respiratory failure with hypoxia   • Parainfluenza   • Elevated troponin     Past Medical History:   Diagnosis Date   • CAD (coronary artery disease)    • COPD (chronic obstructive pulmonary disease)    • Diabetes    • Disease of thyroid gland    • Elevated cholesterol    • Hyperlipidemia    • Hypertension    • Myocardial infarction 2011    tx with PCI   • Sinus bradycardia    • ST elevation myocardial infarction involving left anterior descending (LAD) coronary artery 2016 tx with PCI     Past Surgical History:   Procedure Laterality Date   • CARDIAC CATHETERIZATION      :3.0x15mm Vision pLAD; 3.5x20mm Vision p circ; 3.5x28mm Vision to mRCA   • CARPAL TUNNEL RELEASE Left    • CORONARY ANGIOPLASTY     • CORONARY STENT PLACEMENT      :3.0x15mm Vision pLAD; 3.5x20mm Vision p circ; 3.5x28mm Vision to mRCA   • JOINT REPLACEMENT        General Information     Row Name 23 1221          Physical Therapy Time and Intention    Document Type evaluation  -CH     Mode of  Treatment individual therapy;physical therapy  -     Row Name 05/06/23 1221          General Information    Prior Level of Function independent:;gait;transfer;bed mobility  -     Existing Precautions/Restrictions no known precautions/restrictions  -     Barriers to Rehab none identified  -     Row Name 05/06/23 1221          Living Environment    People in Home spouse  -     Row Name 05/06/23 1221          Cognition    Orientation Status (Cognition) oriented x 4  -           User Key  (r) = Recorded By, (t) = Taken By, (c) = Cosigned By    Initials Name Provider Type    Arlene Rojas, PT Physical Therapist               Mobility     Row Name 05/06/23 1228          Bed Mobility    Bed Mobility supine-sit;sit-supine  -     Supine-Sit [place] (Bed Mobility) independent  -     Sit-Supine [place] (Bed Mobility) independent  -     Row Name 05/06/23 1228          Sit-Stand Transfer    Sit-Stand [place] (Transfers) independent  -[facility] Name 05/06/23 1228          Gait/Stairs (Locomotion)    [place] Level (Gait) independent  -     Distance in Feet (Gait) 100  -     Comment, (Gait/Stairs) Pt ambulated several laps in room on O2, no LOB or impaired gait noted  -           User Key  (r) = Recorded By, (t) = Taken By, (c) = Cosigned By    Initials Name Provider Type    Arlene Rojas PT Physical Therapist               Obj/Interventions     Row Name 05/06/23 1229          Range of Motion Comprehensive    General Range of Motion no range of motion deficits identified  -[facility] Name 05/06/23 1229          Strength Comprehensive (MMT)    General Manual Muscle Testing (MMT) Assessment no strength deficits identified  -[facility] Name 05/06/23 1229          Balance    Balance Assessment standing static balance;standing dynamic balance  -     Dynamic Standing Balance independent  -           User Key  (r) = Recorded By, (t) = Taken By, (c) = Cosigned By    Initials  Name Provider Type    Arlene Rojas, PT Physical Therapist               Goals/Plan    No documentation.                Clinical Impression     Row Name 05/06/23 1229          Pain    Pretreatment Pain Rating 0/10 - no pain  -     Posttreatment Pain Rating 0/10 - no pain  -     Row Name 05/06/23 1229          Plan of Care Review    Plan of Care Reviewed With patient  -     Outcome Evaluation Pt is a 71 yo M who was admitted with parainfluenza and respiratory failure. Pt demonstrates adequate strength and balance to perform functional mobility and gait independently. Pt was able to walk 100 ft in room independently while on O2. Pt did have some SOA  and O2 sats dropped to 85% with activity but O2 sats quickly recovered to 91% with rest. Pt reports no acute care PT needs at this time. PT will sign off. Pt is encourage to continue to mobilize with nursing and family.  -     Row Name 05/06/23 1229          Therapy Assessment/Plan (PT)    Criteria for Skilled Interventions Met (PT) no problems identified which require skilled intervention  -     Therapy Frequency (PT) evaluation only  -     Row Name 05/06/23 1229          Positioning and Restraints    Pre-Treatment Position in bed  -     Post Treatment Position bed  -     In Bed supine;call light within reach;encouraged to call for assist;notified [facility]  -           User Key  (r) = Recorded By, (t) = Taken By, (c) = Cosigned By    Initials Name Provider Type    Arlene Rojas, PT Physical Therapist               Outcome Measures     Row Name 05/06/23 1233 05/06/23 0745       How much help from another person do you currently need...    Turning from your back to your side while in flat bed without using bedrails? 4  - 3  -MF    Moving from lying on back to sitting on the side of a flat bed without bedrails? 4  - 3  -MF    Moving to and from a bed to a chair (including a wheelchair)? 4  - 3  -MF    Standing up from a chair using your  arms (e.g., wheelchair, bedside chair)? 4  - 3  -MF    Climbing 3-5 steps with a railing? 3  - 3  -MF    To walk in hospital room? 4  - 3  -MF    AM-PAC 6 Clicks Score (PT) 23  - 18  -    Highest level of mobility 7 --> Walked 25 feet or more  - 6 --> Walked 10 steps or more  -    Row Name 05/06/23 1233          Functional Assessment    Outcome Measure Options AM-PAC 6 Clicks Basic Mobility (PT)  -           User Key  (r) = Recorded By, (t) = Taken By, (c) = Cosigned By    Initials Name Provider Type     Arlene Bowman, PT Physical Therapist    Maude Paul, RN Registered Nurse                             Physical Therapy Education     Title: PT OT SLP Therapies (Done)     Topic: Physical Therapy (Done)     Point: Mobility training (Done)     Learning Progress Summary           Patient Acceptance, E,TB,D, VU,NR by  at 5/6/2023 1234                   Point: Home exercise program (Done)     Learning Progress Summary           Patient Acceptance, E,TB,D, VU,NR by  at 5/6/2023 1234                   Point: Body mechanics (Done)     Learning Progress Summary           Patient Acceptance, E,TB,D, VU,NR by  at 5/6/2023 1234                   Point: Precautions (Done)     Learning Progress Summary           Patient Acceptance, E,TB,D, VU,NR by  at 5/6/2023 1234                               User Key     Initials Effective Dates Name Provider Type [facility] 06/16/21 -  Arlene Bowman, PT Physical Therapist PT              PT Recommendation and Plan     Plan of Care Reviewed With: patient  Outcome Evaluation: Pt is a 73 yo M who was admitted with parainfluenza and respiratory failure. Pt demonstrates adequate strength and balance to perform functional mobility and gait independently. Pt was able to walk 100 ft in room independently while on O2. Pt did have some SOA  and O2 sats dropped to 85% with activity but O2 sats quickly recovered to 91% with rest. Pt reports no acute care  PT needs at this time. PT will sign off. Pt is encourage to continue to mobilize with nursing and family.     Time Calculation:    PT Charges     Row Name 05/06/23 1234             Time Calculation    Start Time 1124  -      Stop Time 1136  -      Time Calculation (min) 12 min  -      PT Received On 05/06/23  -         Time Calculation- PT    Total Timed Code Minutes- PT 8 minute(s)  -CH         Timed Charges    27656 - Gait Training Minutes  8  -CH         Total Minutes    Timed Charges Total Minutes 8  -CH       Total Minutes 8  -CH            User Key  (r) = Recorded By, (t) = Taken By, (c) = Cosigned By    Initials Name Provider Type     Arlene Bowman, PT Physical Therapist              Therapy Charges for Today     Code Description Service Date Service Provider Modifiers Qty    [id number]  GAIT TRAINING EA 15 MIN 5/6/2023 Arlene Bowman, PT GP 1    [id number]  PT EVAL LOW COMPLEXITY 1 5/6/2023 Arlene Bowman, PT GP 1          PT G-Codes  Outcome Measure Options: AM-PAC 6 Clicks Basic Mobility (PT)  AM-PAC 6 Clicks Score (PT): 23  PT Discharge Summary  Anticipated Discharge Disposition (PT): home    Arlene Bowman PT  5/6/2023

## 2023-05-06 NOTE — ED NOTES
Nursing report ED to floor  Db Hardin  72 y.o.  male    HPI :   Chief Complaint   Patient presents with    Cough    URI       Admitting doctor:   Lasha Iqbal DO    Admitting diagnosis:   The primary encounter diagnosis was Acute hypoxemic respiratory failure. Diagnoses of Fever in adult, Parainfluenza infection, and Chronic obstructive pulmonary disease, unspecified COPD type were also pertinent to this visit.    Code status:   Current Code Status       Date Active Code Status Order ID Comments User Context       Prior            Allergies:   Patient has no known allergies.    Isolation:   Enhanced Droplet/Contact     Intake and Output  No intake or output data in the 24 hours ending 05/05/23 2111    Weight:       05/05/23  1745   Weight: 71.7 kg (158 lb)       Most recent vitals:   Vitals:    05/05/23 1813 05/05/23 1846 05/05/23 1910 05/05/23 2106   BP:   133/98 110/66   BP Location:   Left arm    Patient Position:   Sitting    Pulse: 92  92 77   Resp:  22 22    Temp:       SpO2: 93%  93% 93%   Weight:       Height:           Active LDAs/IV Access:   Lines, Drains & Airways       Active LDAs       Name Placement date Placement time Site Days    Peripheral IV 05/05/23 1904 Anterior;Right Forearm 05/05/23 1904  Forearm  less than 1                    Labs (abnormal labs have a star):   Labs Reviewed   RESPIRATORY PANEL PCR W/ COVID-19 (SARS-COV-2) SUSU/LISA/MIGUEL ÁNGEL/PAD/COR/MAD/HUY IN-HOUSE, NP SWAB IN UTM/VTP, 3-4 HR TAT - Abnormal; Notable for the following components:       Result Value    Parainfluenza Virus 3 Detected (*)     All other components within normal limits    Narrative:     In the setting of a positive respiratory panel with a viral infection PLUS a negative procalcitonin without other underlying concern for bacterial infection, consider observing off antibiotics or discontinuation of antibiotics and continue supportive care. If the respiratory panel is positive for atypical bacterial infection  (Bordetella pertussis, Chlamydophila pneumoniae, or Mycoplasma pneumoniae), consider antibiotic de-escalation to target atypical bacterial infection.   COMPREHENSIVE METABOLIC PANEL - Abnormal; Notable for the following components:    Glucose 111 (*)     BUN 34 (*)     CO2 30.0 (*)     Total Bilirubin 1.3 (*)     BUN/Creatinine Ratio 28.8 (*)     All other components within normal limits    Narrative:     GFR Normal >60  Chronic Kidney Disease <60  Kidney Failure <15    The GFR formula is only valid for adults with stable renal function between ages 18 and 70.   URINALYSIS W/ CULTURE IF INDICATED - Abnormal; Notable for the following components:    Color, UA Dark Yellow (*)     Ketones, UA Trace (*)     Protein, UA 30 mg/dL (1+) (*)     Leuk Esterase, UA Trace (*)     All other components within normal limits    Narrative:     In absence of clinical symptoms, the presence of pyuria, bacteria, and/or nitrites on the urinalysis result does not correlate with infection.   TROPONIN - Abnormal; Notable for the following components:    HS Troponin T 16 (*)     All other components within normal limits    Narrative:     High Sensitive Troponin T Reference Range:  <10.0 ng/L- Negative Female for AMI  <15.0 ng/L- Negative Male for AMI  >=10 - Abnormal Female indicating possible myocardial injury.  >=15 - Abnormal Male indicating possible myocardial injury.   Clinicians would have to utilize clinical acumen, EKG, Troponin, and serial changes to determine if it is an Acute Myocardial Infarction or myocardial injury due to an underlying chronic condition.        CBC WITH AUTO DIFFERENTIAL - Abnormal; Notable for the following components:    WBC 12.07 (*)     MCH 33.6 (*)     Neutrophil % 79.0 (*)     Lymphocyte % 11.8 (*)     Eosinophil % 0.1 (*)     Neutrophils, Absolute 9.53 (*)     Monocytes, Absolute 1.05 (*)     All other components within normal limits   PROTIME-INR - Normal   APTT - Normal   LACTIC ACID, PLASMA - Normal  "  PROCALCITONIN - Normal    Narrative:     As a Marker for Sepsis (Non-Neonates):    1. <0.5 ng/mL represents a low risk of severe sepsis and/or septic shock.  2. >2 ng/mL represents a high risk of severe sepsis and/or septic shock.    As a Marker for Lower Respiratory Tract Infections that require antibiotic therapy:    PCT on Admission    Antibiotic Therapy       6-12 Hrs later    >0.5                Strongly Recommended  >0.25 - <0.5        Recommended   0.1 - 0.25          Discouraged              Remeasure/reassess PCT  <0.1                Strongly Discouraged     Remeasure/reassess PCT    As 28 day mortality risk marker: \"Change in Procalcitonin Result\" (>80% or <=80%) if Day 0 (or Day 1) and Day 4 values are available. Refer to http://[email or web address]mobilepct-calculator.com    Change in PCT <=80%  A decrease of PCT levels below or equal to 80% defines a positive change in PCT test result representing a higher risk for 28-day all-cause mortality of patients diagnosed with severe sepsis for septic shock.    Change in PCT >80%  A decrease of PCT levels of more than 80% defines a negative change in PCT result representing a lower risk for 28-day all-cause mortality of patients diagnosed with severe sepsis or septic shock.      BLOOD CULTURE   BLOOD CULTURE   URINALYSIS, MICROSCOPIC ONLY   HIGH SENSITIVITIY TROPONIN T 2HR   CBC AND DIFFERENTIAL    Narrative:     The following orders were created for panel order CBC & Differential.  Procedure                               Abnormality         Status                     ---------                               -----------         ------                     CBC Auto Differential[[id number]]        Abnormal            Final result                 Please view results for these tests on the individual orders.       EKG:   ECG 12 Lead Dyspnea   Preliminary Result   HEART RATE= 86  bpm   RR Interval= 698  ms   NV Interval= 166  ms   P Horizontal Axis= -1  deg   P Front Axis= 43  deg "   QRSD Interval= 83  ms   QT Interval= 325  ms   QRS Axis= 50  deg   T Wave Axis= 86  deg   - OTHERWISE NORMAL ECG -   Sinus rhythm   Low voltage, extremity leads   Abnormal R-wave progression, late transition   Electronically Signed By:    Date and Time of Study: 2023 18:37:15          Meds given in ED:   Medications   sodium chloride 0.9 % flush 10 mL (has no administration in time range)   ipratropium-albuterol (DUO-NEB) nebulizer solution 3 mL (3 mL Nebulization Given 23)   acetaminophen (TYLENOL) tablet 1,000 mg (1,000 mg Oral Given 23)   sodium chloride 0.9 % bolus 500 mL (0 mL Intravenous Stopped 23)   benzonatate (TESSALON) capsule 200 mg (200 mg Oral Given 23)   morphine injection 4 mg (4 mg Intravenous Given 23)   methylPREDNISolone sodium succinate (SOLU-Medrol) injection 125 mg (125 mg Intravenous Given 23)       Imaging results:  XR Chest 1 View    Result Date: 2023  No focal pulmonary consolidation. Follow-up as clinical indications persist.  This report was finalized on 2023 7:04 PM by Dr. Andrei Dumont M.D.       Ambulatory status:   - standby    Social issues:   Social History     Socioeconomic History    Marital status:    Tobacco Use    Smoking status: Former     Packs/day: 2.00     Years: 30.00     Pack years: 60.00     Types: Cigarettes     Quit date: 2011     Years since quittin.8    Smokeless tobacco: Never    Tobacco comments:     caffeine use - coffee on occas.    Substance and Sexual Activity    Alcohol use: Yes     Alcohol/week: 1.0 standard drink     Types: 1 Cans of beer per week     Comment: beer --occ    Drug use: No    Sexual activity: Defer       NIH Stroke Scale:         Wellington Quintana RN  23 21:11 EDT

## 2023-05-06 NOTE — H&P
Patient Name:  Db Hardin  YOB: 1950  MRN:  [id number]  Admit Date:  5/5/2023  Patient Care Team:  Lily Porter MD as PCP - General (Pediatrics)      Subjective   History Present Illness     Chief Complaint   Patient presents with   • Cough   • URI     History of Present Illness   Mr. Hardin is a 72-year-old male with history of COPD, coronary stent, HTN who presents to the emergency room with cough, fever, upper respiratory symptoms.  Patient states she had the symptoms since Monday history, he was seen in urgent care facility and prescribed azithromycin and Medrol which he has been taking with no improvement in symptoms.  He states he has had a fever at home.  He has also had a severe cough that has been nonproductive until today he started coughing up some gray sputum.  He denies any swelling in his legs.  He will be admitted to hospitalist he denies any chest pain exacerbation.  He is currently on 2 L.  At home due to his COPD.  He has been using his prescribed inhalers and nebulizer treatments with no significant improvement.  In the emergency room patient's troponin was 16 with a repeat of 23 does report noncompliance with her medications  and delta of 7, patient denies any chest pain, EKG showed sinus rhythm with no significant change from previous.  Blood glucose 111, sodium 138, creatinine 1.18, bun 34, wbc 12.07, hemoglobin 17.1, hematocrit 48.4, platelets 203.  Hypoglycemia treatment.  Patient did test positive for parainfluenza virus, chest x-ray was unremarkable for any acute disease.  Patient was given Solu-Medrol and DuoNeb in the emergency room.    Review of Systems   Constitutional: Positive for fever. Negative for appetite change.   HENT: Positive for sinus pressure. Negative for nosebleeds and trouble swallowing.    Eyes: Negative for photophobia, redness and visual disturbance.   Respiratory: Positive for cough, shortness of breath and wheezing. Negative for chest  tightness.    Cardiovascular: Negative for chest pain, palpitations and leg swelling.   Gastrointestinal: Negative for abdominal distention, abdominal pain, nausea and vomiting.   Endocrine: Negative.    Genitourinary: Negative.    Musculoskeletal: Negative for gait problem and joint swelling.   Skin: Negative.    Neurological: Negative for dizziness, seizures, speech difficulty, light-headedness and headaches.   Hematological: Negative.    Psychiatric/Behavioral: Negative for behavioral problems and confusion.        Personal History     Past Medical History:   Diagnosis Date   • CAD (coronary artery disease)    • COPD (chronic obstructive pulmonary disease)    • Diabetes    • Disease of thyroid gland    • Elevated cholesterol    • Hyperlipidemia    • Hypertension    • Myocardial infarction 03/2011    tx with PCI   • Sinus bradycardia    • ST elevation myocardial infarction involving left anterior descending (LAD) coronary artery 5/6/2016 2011 tx with PCI     Past Surgical History:   Procedure Laterality Date   • CARDIAC CATHETERIZATION      2011:3.0x15mm Vision pLAD; 3.5x20mm Vision p circ; 3.5x28mm Vision to mRCA   • CARPAL TUNNEL RELEASE Left    • CORONARY ANGIOPLASTY     • CORONARY STENT PLACEMENT      2011:3.0x15mm Vision pLAD; 3.5x20mm Vision p circ; 3.5x28mm Vision to mRCA   • JOINT REPLACEMENT       Family History   Problem Relation Age of Onset   • No Known Problems Mother    • Cancer Father         lung cancer (smoker)    • No Known Problems Sister    • No Known Problems Brother    • No Known Problems Maternal Grandmother    • No Known Problems Maternal Grandfather    • No Known Problems Paternal Grandmother    • No Known Problems Paternal Grandfather    • No Known Problems Brother    • No Known Problems Sister    • Brain cancer Brother    • Heart attack Brother      Social History     Tobacco Use   • Smoking status: Former     Packs/day: 2.00     Years: 30.00     Pack years: 60.00     Types:  Cigarettes     Quit date: 2011     Years since quittin.8   • Smokeless tobacco: Never   • Tobacco comments:     caffeine use - coffee on occas.    Substance Use Topics   • Alcohol use: Yes     Alcohol/week: 1.0 standard drink     Types: 1 Cans of beer per week     Comment: beer --occ   • Drug use: No     No current facility-administered medications on file prior to encounter.     Current Outpatient Medications on File Prior to Encounter   Medication Sig Dispense Refill   • albuterol (PROVENTIL HFA;VENTOLIN HFA) 108 (90 BASE) MCG/ACT inhaler Inhale 2 puffs Every 4 (Four) Hours As Needed for wheezing. 6.7 g 11   • aspirin 81 MG tablet Take 1 tablet by mouth Daily.     • budesonide (PULMICORT) 0.5 MG/2ML nebulizer solution Inhale 2 mL Daily.     • formoterol (PERFOROMIST) 20 MCG/2ML nebulizer solution Take  by nebulization 2 (Two) Times a Day.     • ipratropium-albuterol (DUO-NEB) 0.5-2.5 mg/3 ml nebulizer Take 3 mL by nebulization Every 4 (Four) Hours As Needed for Wheezing.     • levothyroxine (SYNTHROID, LEVOTHROID) 88 MCG tablet Take 75 mcg by mouth Daily.     • pravastatin (PRAVACHOL) 40 MG tablet Take 1 tablet by mouth Daily.     • Budeson-Glycopyrrol-Formoterol (BREZTRI AEROSPHERE IN) Inhale.     • meclizine (ANTIVERT) 25 MG tablet Take 25 mg by mouth As Needed for Dizziness.     • montelukast (SINGULAIR) 10 MG tablet Take 1 tablet by mouth Every Night.     • tamsulosin (FLOMAX) 0.4 MG capsule 24 hr capsule Take 1 capsule by mouth Daily.     • traZODone (DESYREL) 50 MG tablet Take 1 tablet by mouth Every Night.     • [DISCONTINUED] triamcinolone (KENALOG) 0.025 % cream Apply  topically to the appropriate area as directed Daily.     • [DISCONTINUED] umeclidinium-vilanterol (ANORO ELLIPTA) 62.5-25 MCG/INH aerosol powder  inhaler Inhale 1 puff Daily.       No Known Allergies    Objective    Objective     Vital Signs  Temp:  [98.2 °F (36.8 °C)-101.5 °F (38.6 °C)] 98.2 °F (36.8 °C)  Heart Rate:  []  69  Resp:  [16-28] 16  BP: (110-133)/(66-98) 117/67  SpO2:  [87 %-93 %] 90 %  on  Flow (L/min):  [2-4] 2;   Device (Oxygen Therapy): nasal cannula  Body mass index is 25.12 kg/m².    Physical Exam  Vitals and nursing note reviewed.   Constitutional:       General: He is not in acute distress.     Appearance: He is well-developed.   HENT:      Head: Normocephalic.   Neck:      Vascular: No JVD.   Cardiovascular:      Rate and Rhythm: Normal rate and regular rhythm.      Heart sounds: Normal heart sounds.      Comments: NSR on the monitor, no peripheral edema    Pulmonary:      Effort: Pulmonary effort is normal.      Breath sounds: Examination of the right-upper field reveals wheezing. Examination of the left-upper field reveals wheezing. Examination of the right-lower field reveals decreased breath sounds. Examination of the left-lower field reveals decreased breath sounds. Decreased breath sounds and wheezing present.      Comments: 1L of oxygen with sats 94% during my exam  Abdominal:      General: Bowel sounds are normal. There is no distension.      Palpations: Abdomen is soft.      Tenderness: There is no abdominal tenderness.   Musculoskeletal:         General: Normal range of motion.      Cervical back: Normal range of motion.   Skin:     General: Skin is warm and dry.      Capillary Refill: Capillary refill takes less than 2 seconds.   Neurological:      Mental Status: He is alert and oriented to person, place, and time.   Psychiatric:         Mood and Affect: Mood normal.         Speech: Speech normal.         Behavior: Behavior normal.         Cognition and Memory: Cognition normal.         Results Review:  I reviewed the patient's new clinical results.  I reviewed the patient's new imaging results and agree with the interpretation.  I reviewed the patient's other test results and agree with the interpretation  I personally viewed and interpreted the patient's EKG/Telemetry data  Discussed with ED  provider.    Lab Results (last 24 hours)     Procedure Component Value Units Date/Time    CBC & Differential [[id number]]  (Abnormal) Collected: 05/05/23 1817    Specimen: Blood Updated: 05/05/23 1836    Narrative:      The following orders were created for panel order CBC & Differential.  Procedure                               Abnormality         Status                     ---------                               -----------         ------                     CBC Auto Differential[[id number]]        Abnormal            Final result                 Please view results for these tests on the individual orders.    Comprehensive Metabolic Panel [[id number]]  (Abnormal) Collected: 05/05/23 1817    Specimen: Blood Updated: 05/05/23 1855     Glucose 111 mg/dL      BUN 34 mg/dL      Creatinine 1.18 mg/dL      Sodium 138 mmol/L      Potassium 4.2 mmol/L      Chloride 99 mmol/L      CO2 30.0 mmol/L      Calcium 9.0 mg/dL      Total Protein 7.1 g/dL      Albumin 4.7 g/dL      ALT (SGPT) 21 U/L      AST (SGOT) 23 U/L      Alkaline Phosphatase 104 U/L      Total Bilirubin 1.3 mg/dL      Globulin 2.4 gm/dL      A/G Ratio 2.0 g/dL      BUN/Creatinine Ratio 28.8     Anion Gap 9.0 mmol/L      eGFR 64.8 mL/min/1.73     Narrative:      GFR Normal >60  Chronic Kidney Disease <60  Kidney Failure <15    The GFR formula is only valid for adults with stable renal function between ages 18 and 70.    Protime-INR [[id number]]  (Normal) Collected: 05/05/23 1817    Specimen: Blood Updated: 05/05/23 1850     Protime 13.3 Seconds      INR 1.00    aPTT [[id number]]  (Normal) Collected: 05/05/23 1817    Specimen: Blood Updated: 05/05/23 1850     PTT 24.1 seconds     High Sensitivity Troponin T [[id number]]  (Abnormal) Collected: 05/05/23 1817    Specimen: Blood Updated: 05/05/23 1855     HS Troponin T 16 ng/L     Narrative:      High Sensitive Troponin T Reference Range:  <10.0 ng/L- Negative Female for AMI  <15.0 ng/L- Negative Male for AMI  >=10 -  Abnormal Female indicating possible myocardial injury.  >=15 - Abnormal Male indicating possible myocardial injury.   Clinicians would have to utilize clinical acumen, EKG, Troponin, and serial changes to determine if it is an Acute Myocardial Infarction or myocardial injury due to an underlying chronic condition.         Lactic Acid, Plasma [[id number]]  (Normal) Collected: 05/05/23 1817    Specimen: Blood Updated: 05/05/23 1852     Lactate 1.9 mmol/L     CBC Auto Differential [[id number]]  (Abnormal) Collected: 05/05/23 1817    Specimen: Blood Updated: 05/05/23 1836     WBC 12.07 10*3/mm3      RBC 5.09 10*6/mm3      Hemoglobin 17.1 g/dL      Hematocrit 48.4 %      MCV 95.1 fL      MCH 33.6 pg      MCHC 35.3 g/dL      RDW 13.4 %      RDW-SD 46.4 fl      MPV 9.5 fL      Platelets 203 10*3/mm3      Neutrophil % 79.0 %      Lymphocyte % 11.8 %      Monocyte % 8.7 %      Eosinophil % 0.1 %      Basophil % 0.2 %      Immature Grans % 0.2 %      Neutrophils, Absolute 9.53 10*3/mm3      Lymphocytes, Absolute 1.43 10*3/mm3      Monocytes, Absolute 1.05 10*3/mm3      Eosinophils, Absolute 0.01 10*3/mm3      Basophils, Absolute 0.02 10*3/mm3      Immature Grans, Absolute 0.03 10*3/mm3      nRBC 0.0 /100 WBC     Procalcitonin [[id number]]  (Normal) Collected: 05/05/23 1817    Specimen: Blood Updated: 05/05/23 1940     Procalcitonin 0.09 ng/mL     Narrative:      As a Marker for Sepsis (Non-Neonates):    1. <0.5 ng/mL represents a low risk of severe sepsis and/or septic shock.  2. >2 ng/mL represents a high risk of severe sepsis and/or septic shock.    As a Marker for Lower Respiratory Tract Infections that require antibiotic therapy:    PCT on Admission    Antibiotic Therapy       6-12 Hrs later    >0.5                Strongly Recommended  >0.25 - <0.5        Recommended   0.1 - 0.25          Discouraged              Remeasure/reassess PCT  <0.1                Strongly Discouraged     Remeasure/reassess PCT    As 28 day  "mortality risk marker: \"Change in Procalcitonin Result\" (>80% or <=80%) if Day 0 (or Day 1) and Day 4 values are available. Refer to http://[email or web address]-pct-calculator.com    Change in PCT <=80%  A decrease of PCT levels below or equal to 80% defines a positive change in PCT test result representing a higher risk for 28-day all-cause mortality of patients diagnosed with severe sepsis for septic shock.    Change in PCT >80%  A decrease of PCT levels of more than 80% defines a negative change in PCT result representing a lower risk for 28-day all-cause mortality of patients diagnosed with severe sepsis or septic shock.       Respiratory Panel PCR w/COVID-19(SARS-CoV-2) SUSU/LISA/MIGUEL ÁNGEL/PAD/COR/MAD/HUY In-House, NP Swab in UTM/VTM, 3-4 HR TAT - Swab, Nasopharynx [[id number]]  (Abnormal) Collected: 05/05/23 1819    Specimen: Swab from Nasopharynx Updated: 05/05/23 2006     ADENOVIRUS, PCR Not Detected     Coronavirus 229E Not Detected     Coronavirus HKU1 Not Detected     Coronavirus NL63 Not Detected     Coronavirus OC43 Not Detected     COVID19 Not Detected     Human Metapneumovirus Not Detected     Human Rhinovirus/Enterovirus Not Detected     Influenza A PCR Not Detected     Influenza B PCR Not Detected     Parainfluenza Virus 1 Not Detected     Parainfluenza Virus 2 Not Detected     Parainfluenza Virus 3 Detected     Parainfluenza Virus 4 Not Detected     RSV, PCR Not Detected     Bordetella pertussis pcr Not Detected     Bordetella parapertussis PCR Not Detected     Chlamydophila pneumoniae PCR Not Detected     Mycoplasma pneumo by PCR Not Detected    Narrative:      In the setting of a positive respiratory panel with a viral infection PLUS a negative procalcitonin without other underlying concern for bacterial infection, consider observing off antibiotics or discontinuation of antibiotics and continue supportive care. If the respiratory panel is positive for atypical bacterial infection (Bordetella pertussis, " Chlamydophila pneumoniae, or Mycoplasma pneumoniae), consider antibiotic de-escalation to target atypical bacterial infection.    Urinalysis With Culture If Indicated - Urine, Clean Catch [[id number]]  (Abnormal) Collected: 05/05/23 1819    Specimen: Urine, Clean Catch Updated: 05/05/23 1845     Color, UA Dark Yellow     Appearance, UA Clear     pH, UA 5.5     Specific Gravity, UA >=1.030     Glucose, UA Negative     Ketones, UA Trace     Bilirubin, UA Negative     Blood, UA Negative     Protein, UA 30 mg/dL (1+)     Leuk Esterase, UA Trace     Nitrite, UA Negative     Urobilinogen, UA 1.0 E.U./dL    Narrative:      In absence of clinical symptoms, the presence of pyuria, bacteria, and/or nitrites on the urinalysis result does not correlate with infection.    Urinalysis, Microscopic Only - Urine, Clean Catch [[id number]] Collected: 05/05/23 1819    Specimen: Urine, Clean Catch Updated: 05/05/23 1845     RBC, UA 0-2 /HPF      WBC, UA 0-2 /HPF      Comment: Urine culture not indicated.        Bacteria, UA None Seen /HPF      Squamous Epithelial Cells, UA 0-2 /HPF      Hyaline Casts, UA 7-12 /LPF      Methodology Automated Microscopy    Blood Culture - Blood, Arm, Right [[id number]] Collected: 05/05/23 1820    Specimen: Blood from Arm, Right Updated: 05/05/23 1830    Blood Culture - Blood, Arm, Left [[id number]] Collected: 05/05/23 1905    Specimen: Blood from Arm, Left Updated: 05/05/23 1910    High Sensitivity Troponin T 2Hr [[id number]]  (Abnormal) Collected: 05/05/23 2052    Specimen: Blood Updated: 05/05/23 2123     HS Troponin T 23 ng/L      Troponin T Delta 7 ng/L     Narrative:      High Sensitive Troponin T Reference Range:  <10.0 ng/L- Negative Female for AMI  <15.0 ng/L- Negative Male for AMI  >=10 - Abnormal Female indicating possible myocardial injury.  >=15 - Abnormal Male indicating possible myocardial injury.   Clinicians would have to utilize clinical acumen, EKG, Troponin, and serial changes to determine  if it is an Acute Myocardial Infarction or myocardial injury due to an underlying chronic condition.               Imaging Results (Last 24 Hours)     Procedure Component Value Units Date/Time    XR Chest 1 View [[id number]] Collected: 05/05/23 1904     Updated: 05/05/23 1907    Narrative:      XR CHEST 1 VW-     HISTORY: Male who is 72 years-old,  cough     TECHNIQUE: Frontal views of the chest     COMPARISON: 8/27/2020     FINDINGS: Heart, mediastinum and pulmonary vasculature are unremarkable.  No focal pulmonary consolidation, pleural effusion, or pneumothorax. ON  old granulomatous disease is seen. No acute osseous process.       Impression:      No focal pulmonary consolidation. Follow-up as clinical  indications persist.     This report was finalized on 5/5/2023 7:04 PM by Dr. Andrei Dumont M.D.             Results for orders placed during the hospital encounter of 08/29/22    Adult Transthoracic Echo Complete W/ Cont if Necessary Per Protocol    Interpretation Summary  · Estimated right ventricular systolic pressure from tricuspid regurgitation is normal (<35 mmHg). Calculated right ventricular systolic pressure from tricuspid regurgitation is 22.8 mmHg.  · Calculated left ventricular EF = 58.9% Estimated left ventricular EF was in agreement with the calculated left ventricular EF. Left ventricular systolic function is normal.  · There is calcification of the aortic valve.  · Left ventricular diastolic function was normal.      ECG 12 Lead Dyspnea   Preliminary Result   HEART RATE= 86  bpm   RR Interval= 698  ms   MI Interval= 166  ms   P Horizontal Axis= -1  deg   P Front Axis= 43  deg   QRSD Interval= 83  ms   QT Interval= 325  ms   QRS Axis= 50  deg   T Wave Axis= 86  deg   - OTHERWISE NORMAL ECG -   Sinus rhythm   Low voltage, extremity leads   Abnormal R-wave progression, late transition   Electronically Signed By:    Date and Time of Study: 2023-05-05 18:37:15           Assessment/Plan     Active  Hospital Problems    Diagnosis  POA   • **Acute hypoxemic respiratory failure [J96.01]  Yes   • COPD exacerbation [J44.1]  Yes   • Acute on chronic respiratory failure with hypoxia [J96.21]  Yes   • Parainfluenza [B34.8]  Unknown   • Type 2 diabetes mellitus [E11.9]  Yes   • S/P coronary artery stent placement [Z95.5]  Not Applicable     Description: 2011:3.0x15mm Vision pLAD; 3.5x20mm Vision p circ; 3.5x28mm Vision to mRCA     • Hypertension [I10]  Yes     Mr. Hardin is a 72-year-old male with history of COPD, coronary stent, HTN who presents to the emergency room with cough, fever, upper respiratory symptoms    Acute hypoxic respiratory failure/COPD exacerbation/parainfluenza  -Continue oxygen to keep sats 88-92  -DuoNeb treatments every 6 hours  -Albuterol treatments as needed for wheezing  -Prednisone 40 mg daily starting tomorrow  -Hycodan cough  -Telemetry unit for monitoring.    Hypertension/elevated troponin/coronary artery stent  -Repeat troponin in a.m.  - telemetry monitoring  -Consult cardiology, patient sees Dr. Resendiz    Type 2 diabetes  -Patient currently not on any medication  -will check Hgb A1C    · I discussed the patient's findings and my recommendations with patient.    VTE Prophylaxis - SCDs.  Code Status - Full code.       MICHAEL Sierra  [facility]  05/05/23  23:39 EDT

## 2023-05-06 NOTE — PROGRESS NOTES
Name: Db Hardin ADMIT: 2023   : 1950  PCP: Lily Porter MD    MRN: [id number] LOS: 1 days   AGE/SEX: 72 y.o. male  ROOM: [facility]     Subjective   Subjective   Laying in bed, spouse present at bedside.  Patient feeling better, shortness of breath improved..  States coughing up dark/gray sputum.  No fevers, chills, chest pain, palpitations.  No nausea or vomiting.    Review of Systems   As above  Objective   Objective   Vital Signs  Temp:  [97.6 °F (36.4 °C)-101.5 °F (38.6 °C)] 98.5 °F (36.9 °C)  Heart Rate:  [] 66  Resp:  [16-28] 18  BP: (110-133)/(66-98) 117/68  SpO2:  [87 %-99 %] 92 %  on  Flow (L/min):  [2-4] 2;   Device (Oxygen Therapy): nasal cannula  Body mass index is 24.32 kg/m².  Physical Exam    General: Alert and oriented x3, no acute distress  HEENT: Normocephalic, atraumatic  CV: Regular rate and rhythm, no murmurs rubs or gallops  Lungs: Clear to auscultation bilaterally, no crackles or wheezes  Abdomen: Soft, nontender, nondistended  Extremities: No significant peripheral edema , no cyanosis     Results Review     I reviewed the patient's new clinical results.  Results from last 7 days   Lab Units 23  0625 23   WBC 10*3/mm3 11.29* 12.07*   HEMOGLOBIN g/dL 14.8 17.1   PLATELETS 10*3/mm3 162 203     Results from last 7 days   Lab Units 23  0625 23  181   SODIUM mmol/L 133* 138   POTASSIUM mmol/L 4.4 4.2   CHLORIDE mmol/L 100 99   CO2 mmol/L 23.3 30.0*   BUN mg/dL 32* 34*   CREATININE mg/dL 1.02 1.18   GLUCOSE mg/dL 179* 111*   Estimated Creatinine Clearance: 62.4 mL/min (by C-G formula based on SCr of 1.02 mg/dL).  Results from last 7 days   Lab Units 23   ALBUMIN g/dL 4.7   BILIRUBIN mg/dL 1.3*   ALK PHOS U/L 104   AST (SGOT) U/L 23   ALT (SGPT) U/L 21     Results from last 7 days   Lab Units 23  0625 23   CALCIUM mg/dL 8.8 9.0   ALBUMIN g/dL  --  4.7     Results from last 7 days   Lab Units 23    PROCALCITONIN ng/mL 0.09   LACTATE mmol/L 1.9     COVID19   Date Value Ref Range Status   05/05/2023 Not Detected Not Detected - Ref. Range Final     Hemoglobin A1C   Date/Time Value Ref Range Status   05/06/2023 0625 5.70 (H) 4.80 - 5.60 % Final     Glucose   Date/Time Value Ref Range Status   05/06/2023 1117 219 (H) 70 - 130 mg/dL Final     Comment:     Meter: [id number] : [id number] Jaskaran PEDERSEN   05/06/2023 0625 160 (H) 70 - 130 mg/dL Final     Comment:     Meter: [id number] : [id number] Micheal DeTeressaNeheraclio       XR Chest 1 View  Narrative: XR CHEST 1 VW-     HISTORY: Male who is 72 years-old,  cough     TECHNIQUE: Frontal views of the chest     COMPARISON: 8/27/2020     FINDINGS: Heart, mediastinum and pulmonary vasculature are unremarkable.  No focal pulmonary consolidation, pleural effusion, or pneumothorax. ON  old granulomatous disease is seen. No acute osseous process.     Impression: No focal pulmonary consolidation. Follow-up as clinical  indications persist.     This report was finalized on 5/5/2023 7:04 PM by Dr. Andrei Dumont M.D.       Scheduled Medications  arformoterol, 15 mcg, Nebulization, BID - RT  aspirin, 81 mg, Oral, Once  budesonide, 0.5 mg, Nebulization, Daily  guaiFENesin, 600 mg, Oral, Q12H  insulin lispro, 0-7 Units, Subcutaneous, TID With Meals  ipratropium-albuterol, 3 mL, Nebulization, 4x Daily - RT  levothyroxine, 75 mcg, Oral, Daily  montelukast, 10 mg, Oral, Nightly  pravastatin, 40 mg, Oral, Daily  predniSONE, 40 mg, Oral, Daily With Breakfast  tamsulosin, 0.4 mg, Oral, Daily    Infusions  Pharmacy to Dose enoxaparin (LOVENOX),     Diet  Diet: Cardiac Diets; Healthy Heart (2-3 Na+); Texture: Regular Texture (IDDSI 7); Fluid Consistency: Thin (IDDSI 0)       Assessment/Plan     Active Hospital Problems    Diagnosis  POA   • **Acute hypoxemic respiratory failure [J96.01]  Yes   • Elevated troponin [R77.8]  Unknown   • COPD exacerbation [J44.1]  Yes   • Acute on  chronic respiratory failure with hypoxia [J96.21]  Yes   • Parainfluenza [B34.8]  Unknown   • Type 2 diabetes mellitus [E11.9]  Yes   • S/P coronary artery stent placement [Z95.5]  Not Applicable   • Hypertension [I10]  Yes      Resolved Hospital Problems   No resolved problems to display.       Patient is a 72-year-old male with history of COPD, chronic respiratory failure with hypoxia (on 2 L home O2), type 2 diabetes mellitus, CAD, hypertension who presents to [facility] with complaints of worsening dyspnea, cough, congestion over the past 4 to 5 days prior to arrival.     Acute on chronic respiratory failure with hypoxia secondary to Parainfluenza virus 3 and COPD with acute exacerbation  - Meets criteria for diagnosis with: dyspnea, documented O2 sat 87% on room air, increased oxygen requirements.  - RVP + for parainfluenza virus, WBC slightly elevated but has taken steroids, procalcitonin negative.  - CXR negative for evidence of focal consolidation.  - Corticosteroids, scheduled and PRN bronchodilators, PRN medications for symptom control, telemetry, pulse oximetry.  -ABG with normal pH and CO2.  -Symptoms improving, continue current regimen.       CAD with elevated troponin  - Etiology of troponin elevation likely 2/2 acute illness, no signs or symptoms of ACS at present.  - Cardiology consulted. Will continue to follow their plans/recommendations. Greatly appreciate their help.  - Telemetry monitoring.  -Troponin trended down, now within normal limits.  Cardiology evaluated.  Ischemic type II, no ACS.  No further work-up indicated.  -Monitor symptoms.    Hypertension  -BP stable on current regimen.  Continue.      Hyperlipidemia  - Stable. Continue Statin as prescribed.     BPH  - Stable. Continue Tamsulosin as prescribed.      Hyperglycemia secondary to steroids.  Hemoglobin A1c 5.7.  Continue sliding scale insulin.      Discussed with patient, spouse  DVT prophylax: Initiate  Lovenox  Disposition: Home, likely in 1 to 2 days if continues to improve.      I wore protective equipment throughout this patient encounter including a face mask, gloves and protective eyewear.  Hand hygiene was performed before donning protective equipment and after removal when leaving the room.      Dictated utilizing Dragon dictation        Valery Pond MD  [facility]  05/06/23  13:43 EDT

## 2023-05-06 NOTE — CONSULTS
[facility]        Patient Name: Db Hardin  Age/Sex: 72 y.o. male  : 1950  MRN: [id number]    Date of Admission: 2023  Date of Encounter Visit: 23  Encounter Provider: Abhijit Da Silva MD  Referring Provider: Lasha Iqbal DO  Place of Service: [facility] CARDIOLOGY  Patient Care Team:  Lily Porter MD as PCP - General (Pediatrics)    Subjective:     Chief Complaint: cough and shortness of air    Reason for consult: dyspnea and elevated troponin    History of Present Illness:  Db Hardin is a 72 y.o. male who follows Dr. Resendiz in our office.  He has past medical history of hypertension, hyperlipidemia, lower extremity edema, prediabetes,  COPD with emphysema and oxygen use ,  coronary artery disease with STEMI in  and bare-metal stent placement x3, abdominal aortic aneurysm.      He presented with cough, fever and shortness of breath.  He reported recent diagnoses of a sinus infection and taking his medication as prescribed however he had no improvement.  Patient was admitted with acute respiratory failure.  We have been asked to be seen for dyspnea and elevated troponin . His oxygen was 87 % and temp 101.5. duo nebs breathing treatments have been scheduled and he is currently getting Prednisone for suspected COPD exacerbation. Respiratory viral panel detected parainfluenza.     No angina.  Nonspecific EKG findings.  Indeterminant troponin elevation.  Negative stress study in 2022.  Respiratory complaints mildly improved.    HPI was reviewed, updated and amended when necessary.      HsTn 16 and repeat 23 with Troponin T of 7.   Blood culture x 2 pending  sCr 1.02, BUN 32 K 4.4  WBC initially 12.07 and repeat 11.29     Prior Cardiac Testing:  Echocardiogram 2022  • Estimated right ventricular systolic pressure from tricuspid regurgitation is normal (<35 mmHg). Calculated right ventricular systolic pressure from  tricuspid regurgitation is 22.8 mmHg.  • Calculated left ventricular EF = 58.9% Estimated left ventricular EF was in agreement with the calculated left ventricular EF. Left ventricular systolic function is normal.  • There is calcification of the aortic valve.  • Left ventricular diastolic function was normal.    Perfusion stress test 09/12/2022   •  Myocardial perfusion imaging indicates a normal myocardial perfusion study with no evidence of ischemia.  • Diaphragmatic attenuation artifact is present.  • Left ventricular ejection fraction is normal. (Calculated EF = 69%).  • Impressions are consistent with a low risk study.    Zio patch 06/2017    1.  The predominant rhythm is a sinus rhythm with an average heart rate of 73 bpm.  2.  There are rare premature atrial ectopics.  There were no runs of atrial fibrillation nor were there any runs of supraventricular tachycardia.  Bursts of an atrial tachycardia were noted but no longer than 8 beats.  3.  There were rare isolated premature ventricular ectopics recorded.  There were no couplets, triplets, ventricular tachycardia episodes recorded.      Past Medical History:  Past Medical History:   Diagnosis Date   • CAD (coronary artery disease)    • COPD (chronic obstructive pulmonary disease)    • Diabetes    • Disease of thyroid gland    • Elevated cholesterol    • Hyperlipidemia    • Hypertension    • Myocardial infarction 03/2011    tx with PCI   • Sinus bradycardia    • ST elevation myocardial infarction involving left anterior descending (LAD) coronary artery 5/6/2016 2011 tx with PCI       Past Surgical History:   Procedure Laterality Date   • CARDIAC CATHETERIZATION      2011:3.0x15mm Vision pLAD; 3.5x20mm Vision p circ; 3.5x28mm Vision to mRCA   • CARPAL TUNNEL RELEASE Left    • CORONARY ANGIOPLASTY     • CORONARY STENT PLACEMENT      2011:3.0x15mm Vision pLAD; 3.5x20mm Vision p circ; 3.5x28mm Vision to mRCA   • JOINT REPLACEMENT         Home Medications:    Medications Prior to Admission   Medication Sig Dispense Refill Last Dose   • albuterol (PROVENTIL HFA;VENTOLIN HFA) 108 (90 BASE) MCG/ACT inhaler Inhale 2 puffs Every 4 (Four) Hours As Needed for wheezing. 6.7 g 11    • aspirin 81 MG tablet Take 1 tablet by mouth Daily.      • budesonide (PULMICORT) 0.5 MG/2ML nebulizer solution Inhale 2 mL Daily.      • formoterol (PERFOROMIST) 20 MCG/2ML nebulizer solution Take  by nebulization 2 (Two) Times a Day.      • ipratropium-albuterol (DUO-NEB) 0.5-2.5 mg/3 ml nebulizer Take 3 mL by nebulization Every 4 (Four) Hours As Needed for Wheezing.      • levothyroxine (SYNTHROID, LEVOTHROID) 88 MCG tablet Take 75 mcg by mouth Daily.      • pravastatin (PRAVACHOL) 40 MG tablet Take 1 tablet by mouth Daily.      • Budeson-Glycopyrrol-Formoterol (BREZTRI AEROSPHERE IN) Inhale.      • meclizine (ANTIVERT) 25 MG tablet Take 25 mg by mouth As Needed for Dizziness.      • montelukast (SINGULAIR) 10 MG tablet Take 1 tablet by mouth Every Night.      • tamsulosin (FLOMAX) 0.4 MG capsule 24 hr capsule Take 1 capsule by mouth Daily.      • traZODone (DESYREL) 50 MG tablet Take 1 tablet by mouth Every Night.          Allergies:  No Known Allergies    Past Social History:  Social History     Socioeconomic History   • Marital status:    Tobacco Use   • Smoking status: Former     Packs/day: 2.00     Years: 30.00     Pack years: 60.00     Types: Cigarettes     Quit date: 2011     Years since quittin.8   • Smokeless tobacco: Never   • Tobacco comments:     caffeine use - coffee on occas.    Vaping Use   • Vaping Use: Never used   Substance and Sexual Activity   • Alcohol use: Yes     Alcohol/week: 1.0 standard drink     Types: 1 Cans of beer per week     Comment: beer --occ   • Drug use: No   • Sexual activity: Defer       Past Family History:  Family History   Problem Relation Age of Onset   • No Known Problems Mother    • Cancer Father         lung cancer (smoker)    • No  Known Problems Sister    • No Known Problems Brother    • No Known Problems Maternal Grandmother    • No Known Problems Maternal Grandfather    • No Known Problems Paternal Grandmother    • No Known Problems Paternal Grandfather    • No Known Problems Brother    • No Known Problems Sister    • Brain cancer Brother    • Heart attack Brother      Past surgical, medical, social and family history was reviewed, updated and amended when necessary.    Review of Systems   Constitutional: Positive for fever and malaise/fatigue. Negative for chills.   HENT: Negative for hoarse voice and sore throat.    Eyes: Negative for double vision and photophobia.   Cardiovascular: Positive for dyspnea on exertion. Negative for chest pain, leg swelling, near-syncope, orthopnea, palpitations, paroxysmal nocturnal dyspnea and syncope.   Respiratory: Positive for cough and shortness of breath. Negative for wheezing.    Skin: Negative for poor wound healing and rash.   Musculoskeletal: Negative for arthritis and joint swelling.   Gastrointestinal: Negative for bloating, abdominal pain, hematemesis and hematochezia.   Neurological: Negative for dizziness and focal weakness.   Psychiatric/Behavioral: Negative for depression and suicidal ideas.         Objective:   Temp:  [97.6 °F (36.4 °C)-101.5 °F (38.6 °C)] 98.5 °F (36.9 °C)  Heart Rate:  [] 58  Resp:  [16-28] 18  BP: (110-133)/(66-98) 117/68     Intake/Output Summary (Last 24 hours) at 5/6/2023 0917  Last data filed at 5/6/2023 0300  Gross per 24 hour   Intake --   Output 700 ml   Net -700 ml     Body mass index is 24.32 kg/m².      05/05/23  1745 05/05/23  2213 05/06/23  0642   Weight: 71.7 kg (158 lb) 71.7 kg (158 lb) 69.4 kg (153 lb)     Weight change:     Vitals reviewed.   Constitutional:       Appearance: Not in distress. Chronically ill-appearing.   Neck:      Vascular: No JVR. JVD normal.   Pulmonary:      Effort: Pulmonary effort is normal.      Breath sounds: No wheezing.  Rhonchi present. No rales.      Comments: Very poor air entry bibasilar zones  Chest:      Chest wall: Not tender to palpatation.   Cardiovascular:      PMI at left midclavicular line. Normal rate. Regular rhythm. Normal S1. Normal S2.      Murmurs: There is no murmur.      No gallop. No click. No rub.   Pulses:     Intact distal pulses.   Edema:     Peripheral edema absent.   Abdominal:      General: Bowel sounds are normal.      Palpations: Abdomen is soft.      Tenderness: There is no abdominal tenderness.   Musculoskeletal: Normal range of motion.         General: No tenderness. Skin:     General: Skin is warm and dry.   Neurological:      General: No focal deficit present.      Mental Status: Alert and oriented to person, place and time.           Lab Review:   Results from last 7 days   Lab Units 05/06/23 0625 05/05/23 1817   SODIUM mmol/L 133* 138   POTASSIUM mmol/L 4.4 4.2   CHLORIDE mmol/L 100 99   CO2 mmol/L 23.3 30.0*   BUN mg/dL 32* 34*   CREATININE mg/dL 1.02 1.18   GLUCOSE mg/dL 179* 111*   CALCIUM mg/dL 8.8 9.0   AST (SGOT) U/L  --  23   ALT (SGPT) U/L  --  21     Results from last 7 days   Lab Units 05/05/23 2052 05/05/23 1817   HSTROP T ng/L 23* 16*     Results from last 7 days   Lab Units 05/06/23  0625 05/05/23 1817   WBC 10*3/mm3 11.29* 12.07*   HEMOGLOBIN g/dL 14.8 17.1   HEMATOCRIT % 41.9 48.4   PLATELETS 10*3/mm3 162 203     Results from last 7 days   Lab Units 05/05/23  1817   INR  1.00   APTT seconds 24.1               Invalid input(s): LDLCALC            Echo  08/29/2022  • Estimated right ventricular systolic pressure from tricuspid regurgitation is normal (<35 mmHg). Calculated right ventricular systolic pressure from tricuspid regurgitation is 22.8 mmHg.  • Calculated left ventricular EF = 58.9% Estimated left ventricular EF was in agreement with the calculated left ventricular EF. Left ventricular systolic function is normal.  • There is calcification of the aortic valve.  • Left  ventricular diastolic function was normal.         EKG:   I personally viewed and interpreted the patient's EKG      Prior EKG 8/11/2022      Imaging:  Imaging Results (Most Recent)     Procedure Component Value Units Date/Time    XR Chest 1 View [[id number]] Collected: 05/05/23 1904     Updated: 05/05/23 1907    Narrative:      XR CHEST 1 VW-     HISTORY: Male who is 72 years-old,  cough     TECHNIQUE: Frontal views of the chest     COMPARISON: 8/27/2020     FINDINGS: Heart, mediastinum and pulmonary vasculature are unremarkable.  No focal pulmonary consolidation, pleural effusion, or pneumothorax. ON  old granulomatous disease is seen. No acute osseous process.       Impression:      No focal pulmonary consolidation. Follow-up as clinical  indications persist.     This report was finalized on 5/5/2023 7:04 PM by Dr. Andrei Dumont M.D.                 Assessment:     Active Hospital Problems    Diagnosis  POA   • **Acute hypoxemic respiratory failure [J96.01]  Yes   • Elevated troponin [R77.8]  Unknown   • COPD exacerbation [J44.1]  Yes   • Acute on chronic respiratory failure with hypoxia [J96.21]  Yes   • Parainfluenza [B34.8]  Unknown   • Type 2 diabetes mellitus [E11.9]  Yes   • S/P coronary artery stent placement [Z95.5]  Not Applicable   • Hypertension [I10]  Yes      Resolved Hospital Problems   No resolved problems to display.          Plan:     1. Parainfluenza pneumonia-continue supportive care  2. Indeterminant troponin elevation-NSTEMI type II not consistent with ACS.  No angina.  Recent negative stress study.  No indication for further testing.  3. Coronary artery disease -continue current medical therapy  4. Advanced COPD-defer to medicine/pulmonary    We will follow tomorrow.  If cardiovascular issues remained stable we will see the patient as needed.  Thank you for the consult.    Thank you for allowing me to participate in the care of Db FIDELINA Hardin. Feel free to contact me directly with any  further questions or concerns.    Abhijit Da Silva MD  [facility]  05/06/23  09:17 EDT

## 2023-05-06 NOTE — PLAN OF CARE
Goal Outcome Evaluation:  Plan of Care Reviewed With: patient           Outcome Evaluation: Pt is a 73 yo M who was admitted with parainfluenza and respiratory failure. Pt demonstrates adequate strength and balance to perform functional mobility and gait independently. Pt was able to walk 100 ft in room independently while on O2. Pt did have some SOA  and O2 sats dropped to 85% with activity but O2 sats quickly recovered to 91% with rest. Pt reports no acute care PT needs at this time. PT will sign off. Pt is encourage to continue to mobilize with nursing and family.

## 2023-05-07 ENCOUNTER — APPOINTMENT (OUTPATIENT)
Dept: GENERAL RADIOLOGY | Facility: HOSPITAL | Age: 73
End: 2023-05-07
Payer: MEDICARE

## 2023-05-07 LAB
ANION GAP SERPL CALCULATED.3IONS-SCNC: 7.7 MMOL/L (ref 5–15)
BASOPHILS # BLD AUTO: 0.02 10*3/MM3 (ref 0–0.2)
BASOPHILS NFR BLD AUTO: 0.1 % (ref 0–1.5)
BUN SERPL-MCNC: 32 MG/DL (ref 8–23)
BUN/CREAT SERPL: 30.5 (ref 7–25)
CALCIUM SPEC-SCNC: 9 MG/DL (ref 8.6–10.5)
CHLORIDE SERPL-SCNC: 98 MMOL/L (ref 98–107)
CO2 SERPL-SCNC: 26.3 MMOL/L (ref 22–29)
CREAT SERPL-MCNC: 1.05 MG/DL (ref 0.76–1.27)
DEPRECATED RDW RBC AUTO: 47.1 FL (ref 37–54)
EGFRCR SERPLBLD CKD-EPI 2021: 74.5 ML/MIN/1.73
EOSINOPHIL # BLD AUTO: 0 10*3/MM3 (ref 0–0.4)
EOSINOPHIL NFR BLD AUTO: 0 % (ref 0.3–6.2)
ERYTHROCYTE [DISTWIDTH] IN BLOOD BY AUTOMATED COUNT: 13.7 % (ref 12.3–15.4)
GLUCOSE BLDC GLUCOMTR-MCNC: 104 MG/DL (ref 70–130)
GLUCOSE BLDC GLUCOMTR-MCNC: 184 MG/DL (ref 70–130)
GLUCOSE BLDC GLUCOMTR-MCNC: 240 MG/DL (ref 70–130)
GLUCOSE BLDC GLUCOMTR-MCNC: 92 MG/DL (ref 70–130)
GLUCOSE SERPL-MCNC: 118 MG/DL (ref 65–99)
HCT VFR BLD AUTO: 39.5 % (ref 37.5–51)
HGB BLD-MCNC: 14.2 G/DL (ref 13–17.7)
IMM GRANULOCYTES # BLD AUTO: 0.03 10*3/MM3 (ref 0–0.05)
IMM GRANULOCYTES NFR BLD AUTO: 0.2 % (ref 0–0.5)
LYMPHOCYTES # BLD AUTO: 1.39 10*3/MM3 (ref 0.7–3.1)
LYMPHOCYTES NFR BLD AUTO: 10 % (ref 19.6–45.3)
MCH RBC QN AUTO: 34.1 PG (ref 26.6–33)
MCHC RBC AUTO-ENTMCNC: 35.9 G/DL (ref 31.5–35.7)
MCV RBC AUTO: 95 FL (ref 79–97)
MONOCYTES # BLD AUTO: 1.38 10*3/MM3 (ref 0.1–0.9)
MONOCYTES NFR BLD AUTO: 9.9 % (ref 5–12)
NEUTROPHILS NFR BLD AUTO: 11.09 10*3/MM3 (ref 1.7–7)
NEUTROPHILS NFR BLD AUTO: 79.8 % (ref 42.7–76)
NRBC BLD AUTO-RTO: 0 /100 WBC (ref 0–0.2)
PLATELET # BLD AUTO: 185 10*3/MM3 (ref 140–450)
PMV BLD AUTO: 9.8 FL (ref 6–12)
POTASSIUM SERPL-SCNC: 4.4 MMOL/L (ref 3.5–5.2)
PROCALCITONIN SERPL-MCNC: 0.27 NG/ML (ref 0–0.25)
RBC # BLD AUTO: 4.16 10*6/MM3 (ref 4.14–5.8)
SODIUM SERPL-SCNC: 132 MMOL/L (ref 136–145)
WBC NRBC COR # BLD: 13.91 10*3/MM3 (ref 3.4–10.8)

## 2023-05-07 PROCEDURE — 25010000002 METHYLPREDNISOLONE PER 125 MG: Performed by: STUDENT IN AN ORGANIZED HEALTH CARE EDUCATION/TRAINING PROGRAM

## 2023-05-07 PROCEDURE — 84145 PROCALCITONIN (PCT): CPT | Performed by: STUDENT IN AN ORGANIZED HEALTH CARE EDUCATION/TRAINING PROGRAM

## 2023-05-07 PROCEDURE — 63710000001 PREDNISONE PER 1 MG: Performed by: NURSE PRACTITIONER

## 2023-05-07 PROCEDURE — 99231 SBSQ HOSP IP/OBS SF/LOW 25: CPT | Performed by: NURSE PRACTITIONER

## 2023-05-07 PROCEDURE — 82948 REAGENT STRIP/BLOOD GLUCOSE: CPT

## 2023-05-07 PROCEDURE — 80048 BASIC METABOLIC PNL TOTAL CA: CPT | Performed by: STUDENT IN AN ORGANIZED HEALTH CARE EDUCATION/TRAINING PROGRAM

## 2023-05-07 PROCEDURE — 94799 UNLISTED PULMONARY SVC/PX: CPT

## 2023-05-07 PROCEDURE — 71045 X-RAY EXAM CHEST 1 VIEW: CPT

## 2023-05-07 PROCEDURE — 85025 COMPLETE CBC W/AUTO DIFF WBC: CPT | Performed by: STUDENT IN AN ORGANIZED HEALTH CARE EDUCATION/TRAINING PROGRAM

## 2023-05-07 PROCEDURE — 25010000002 CEFTRIAXONE PER 250 MG: Performed by: STUDENT IN AN ORGANIZED HEALTH CARE EDUCATION/TRAINING PROGRAM

## 2023-05-07 RX ORDER — METHYLPREDNISOLONE SODIUM SUCCINATE 125 MG/2ML
125 INJECTION, POWDER, LYOPHILIZED, FOR SOLUTION INTRAMUSCULAR; INTRAVENOUS EVERY 12 HOURS
Status: DISCONTINUED | OUTPATIENT
Start: 2023-05-07 | End: 2023-05-08

## 2023-05-07 RX ORDER — GUAIFENESIN 600 MG/1
1200 TABLET, EXTENDED RELEASE ORAL EVERY 12 HOURS SCHEDULED
Status: DISCONTINUED | OUTPATIENT
Start: 2023-05-07 | End: 2023-05-10 | Stop reason: HOSPADM

## 2023-05-07 RX ORDER — PANTOPRAZOLE SODIUM 40 MG/1
40 TABLET, DELAYED RELEASE ORAL
Status: DISCONTINUED | OUTPATIENT
Start: 2023-05-07 | End: 2023-05-10 | Stop reason: HOSPADM

## 2023-05-07 RX ORDER — METHYLPREDNISOLONE SODIUM SUCCINATE 125 MG/2ML
60 INJECTION, POWDER, LYOPHILIZED, FOR SOLUTION INTRAMUSCULAR; INTRAVENOUS EVERY 12 HOURS
Status: DISCONTINUED | OUTPATIENT
Start: 2023-05-07 | End: 2023-05-07

## 2023-05-07 RX ADMIN — PREDNISONE 40 MG: 20 TABLET ORAL at 09:19

## 2023-05-07 RX ADMIN — LEVOTHYROXINE SODIUM 75 MCG: 0.07 TABLET ORAL at 09:19

## 2023-05-07 RX ADMIN — TAMSULOSIN HYDROCHLORIDE 0.4 MG: 0.4 CAPSULE ORAL at 09:19

## 2023-05-07 RX ADMIN — BUDESONIDE 0.5 MG: 0.5 INHALANT ORAL at 06:50

## 2023-05-07 RX ADMIN — IPRATROPIUM BROMIDE AND ALBUTEROL SULFATE 3 ML: .5; 3 SOLUTION RESPIRATORY (INHALATION) at 14:06

## 2023-05-07 RX ADMIN — MONTELUKAST SODIUM 10 MG: 10 TABLET, FILM COATED ORAL at 20:24

## 2023-05-07 RX ADMIN — ARFORMOTEROL TARTRATE 15 MCG: 15 SOLUTION RESPIRATORY (INHALATION) at 19:10

## 2023-05-07 RX ADMIN — METHYLPREDNISOLONE SODIUM SUCCINATE 125 MG: 125 INJECTION, POWDER, FOR SOLUTION INTRAMUSCULAR; INTRAVENOUS at 10:39

## 2023-05-07 RX ADMIN — METHYLPREDNISOLONE SODIUM SUCCINATE 125 MG: 125 INJECTION, POWDER, FOR SOLUTION INTRAMUSCULAR; INTRAVENOUS at 21:35

## 2023-05-07 RX ADMIN — Medication 10 ML: at 21:35

## 2023-05-07 RX ADMIN — GUAIFENESIN 600 MG: 600 TABLET, EXTENDED RELEASE ORAL at 09:19

## 2023-05-07 RX ADMIN — CEFTRIAXONE SODIUM 1 G: 1 INJECTION, POWDER, FOR SOLUTION INTRAMUSCULAR; INTRAVENOUS at 15:35

## 2023-05-07 RX ADMIN — IPRATROPIUM BROMIDE AND ALBUTEROL SULFATE 3 ML: .5; 3 SOLUTION RESPIRATORY (INHALATION) at 10:51

## 2023-05-07 RX ADMIN — PRAVASTATIN SODIUM 40 MG: 40 TABLET ORAL at 09:18

## 2023-05-07 RX ADMIN — ARFORMOTEROL TARTRATE 15 MCG: 15 SOLUTION RESPIRATORY (INHALATION) at 06:49

## 2023-05-07 RX ADMIN — GUAIFENESIN 1200 MG: 600 TABLET, EXTENDED RELEASE ORAL at 20:24

## 2023-05-07 NOTE — PROGRESS NOTES
"[facility]    Patient Name: Db Hardin  :1950  72 y.o.  LOS: 2  Encounter Provider: MICHAEL Perez      Patient Care Team:  Lily Porter MD as PCP - General (Pediatrics)    Chief Complaint: Follow-up cough, dyspnea, elevated troponin    Interval History: Remains with dyspnea, dry cough.  Troponin back to baseline.       Objective   Vital Signs  Temp:  [97 °F (36.1 °C)-99 °F (37.2 °C)] 99 °F (37.2 °C)  Heart Rate:  [] 102  Resp:  [18-20] 18  BP: ()/(64-69) 97/69    Intake/Output Summary (Last 24 hours) at 2023 0902  Last data filed at 2023 1503  Gross per 24 hour   Intake 720 ml   Output 100 ml   Net 620 ml     Flowsheet Rows    Flowsheet Row First Filed Value   Admission Height 167.6 cm (66\") Documented at 2023 174   Admission Weight 71.7 kg (158 lb) Documented at 2023 1745            Constitutional:       Appearance: Normal appearance. Well-developed.   Eyes:      Conjunctiva/sclera: Conjunctivae normal.   Neck:      Vascular: No carotid bruit.   Pulmonary:      Effort: Pulmonary effort is normal.      Breath sounds: Decreased breath sounds present.   Cardiovascular:      Normal rate. Regular rhythm. Normal S1. Normal S2.      Murmurs: There is no murmur.      No gallop. No click. No rub.   Edema:     Peripheral edema absent.   Musculoskeletal: Normal range of motion. Skin:     General: Skin is warm and dry.   Neurological:      Mental Status: Alert and oriented to person, place, and time.      GCS: GCS eye subscore is 4. GCS verbal subscore is 5. GCS motor subscore is 6.   Psychiatric:         Speech: Speech normal.         Behavior: Behavior normal.         Thought Content: Thought content normal.         Judgment: Judgment normal.           Pertinent Test Results:  Results from last 7 days   Lab Units 23  0513 23  0625 23  1817   SODIUM mmol/L 132* 133* 138   POTASSIUM mmol/L 4.4 4.4 4.2   CHLORIDE mmol/L 98 100 99 "   CO2 mmol/L 26.3 23.3 30.0*   BUN mg/dL 32* 32* 34*   CREATININE mg/dL 1.05 1.02 1.18   GLUCOSE mg/dL 118* 179* 111*   CALCIUM mg/dL 9.0 8.8 9.0   AST (SGOT) U/L  --   --  23   ALT (SGPT) U/L  --   --  21     Results from last 7 days   Lab Units 05/06/23  0625 05/05/23 2052 05/05/23  1817   HSTROP T ng/L 9 23* 16*     Results from last 7 days   Lab Units 05/07/23  0513 05/06/23 0625 05/05/23  1817   WBC 10*3/mm3 13.91* 11.29* 12.07*   HEMOGLOBIN g/dL 14.2 14.8 17.1   HEMATOCRIT % 39.5 41.9 48.4   PLATELETS 10*3/mm3 185 162 203     Results from last 7 days   Lab Units 05/05/23 1817   INR  1.00   APTT seconds 24.1               Invalid input(s): LDLCALC                Medication Review:   arformoterol, 15 mcg, Nebulization, BID - RT  aspirin, 81 mg, Oral, Once  budesonide, 0.5 mg, Nebulization, Daily  enoxaparin, 40 mg, Subcutaneous, Q24H  guaiFENesin, 600 mg, Oral, Q12H  insulin lispro, 0-7 Units, Subcutaneous, TID With Meals  ipratropium-albuterol, 3 mL, Nebulization, 4x Daily - RT  levothyroxine, 75 mcg, Oral, Daily  montelukast, 10 mg, Oral, Nightly  pravastatin, 40 mg, Oral, Daily  predniSONE, 40 mg, Oral, Daily With Breakfast  tamsulosin, 0.4 mg, Oral, Daily              Assessment & Plan     Active Hospital Problems    Diagnosis  POA   • **Acute hypoxemic respiratory failure [J96.01]  Yes   • Elevated troponin [R77.8]  Unknown   • COPD exacerbation [J44.1]  Yes   • Acute on chronic respiratory failure with hypoxia [J96.21]  Yes   • Parainfluenza [B34.8]  Unknown   • Type 2 diabetes mellitus [E11.9]  Yes   • S/P coronary artery stent placement [Z95.5]  Not Applicable   • Hypertension [I10]  Yes      Resolved Hospital Problems   No resolved problems to display.        1. Parainfluenza pneumonia: Continue supportive care  2. Indeterminant troponin elevation: NSTEMI type II, not consistent with ACS.  No angina.  Recent negative stress test.  Troponin negative this morning.  3. CAD: Continue current  GDMT  4. Advanced COPD: Defer to medicine/pulmonology    Troponin now back to baseline.    Symptoms consistent with parainfluenza pneumonia    No additional testing required from cardiovascular standpoint    CV services will sign off, please do not hesitate to reach out to if services needed in the future.      MICHAEL Perez  [facility]  05/07/23  09:02 EDT

## 2023-05-07 NOTE — PROGRESS NOTES
Name: Db Hardin ADMIT: 2023   : 1950  PCP: Lily Porter MD    MRN: [id number] LOS: 2 days   AGE/SEX: 72 y.o. male  ROOM: [facility]     Subjective   Subjective   Patient feels more short of breath, increasing oxygen requirements, cough, with sputum production.    Review of Systems   As above  Objective   Objective   Vital Signs  Temp:  [97 °F (36.1 °C)-99 °F (37.2 °C)] 99 °F (37.2 °C)  Heart Rate:  [] 96  Resp:  [18-20] 18  BP: ()/(64-69) 97/69  SpO2:  [90 %-94 %] 91 %  on  Flow (L/min):  [2-5] 5;   Device (Oxygen Therapy): nasal cannula  Body mass index is 24.31 kg/m².  Physical Exam    General: Alert, laying in bed, visibly short of breath, ill-appearing,  HEENT: Normocephalic, atraumatic  CV: Tachycardic, no murmurs s  Lungs:  tachypneic, decreased bilateral, expiratory wheezing, on 5 L nasal cannula  Abdomen: Soft, nontender, nondistended  Extremities: No significant peripheral edema , no cyanosis     Results Review     I reviewed the patient's new clinical results.  Results from last 7 days   Lab Units 23  0523  0625 23  181   WBC 10*3/mm3 13.91* 11.29* 12.07*   HEMOGLOBIN g/dL 14.2 14.8 17.1   PLATELETS 10*3/mm3 185 162 203     Results from last 7 days   Lab Units 23  0513 23  0625 23  1817   SODIUM mmol/L 132* 133* 138   POTASSIUM mmol/L 4.4 4.4 4.2   CHLORIDE mmol/L 98 100 99   CO2 mmol/L 26.3 23.3 30.0*   BUN mg/dL 32* 32* 34*   CREATININE mg/dL 1.05 1.02 1.18   GLUCOSE mg/dL 118* 179* 111*   Estimated Creatinine Clearance: 60.6 mL/min (by C-G formula based on SCr of 1.05 mg/dL).  Results from last 7 days   Lab Units 23  1817   ALBUMIN g/dL 4.7   BILIRUBIN mg/dL 1.3*   ALK PHOS U/L 104   AST (SGOT) U/L 23   ALT (SGPT) U/L 21     Results from last 7 days   Lab Units 23  0523  0625 23  1817   CALCIUM mg/dL 9.0 8.8 9.0   ALBUMIN g/dL  --   --  4.7     Results from last 7 days   Lab Units 23  05  05/05/23  1817   PROCALCITONIN ng/mL 0.27* 0.09   LACTATE mmol/L  --  1.9     COVID19   Date Value Ref Range Status   05/05/2023 Not Detected Not Detected - Ref. Range Final     Hemoglobin A1C   Date/Time Value Ref Range Status   05/06/2023 0625 5.70 (H) 4.80 - 5.60 % Final     Glucose   Date/Time Value Ref Range Status   05/07/2023 1117 92 70 - 130 mg/dL Final     Comment:     Meter: [id number] : [id number] Ismael Sodnicholas CNA   05/07/2023 0636 104 70 - 130 mg/dL Final     Comment:     Meter: [id number] : [id number] Dorothy Krysta FILMEON   05/06/2023 1940 205 (H) 70 - 130 mg/dL Final     Comment:     Meter: [id number] : [id number] Dorothy Krysta FILEMON   05/06/2023 1652 135 (H) 70 - 130 mg/dL Final     Comment:     Meter: [id number] : [id number] Jaskaran Melinda NA   05/06/2023 1117 219 (H) 70 - 130 mg/dL Final     Comment:     Meter: [id number] : [id number] Jaskaran Melinda NA   05/06/2023 0625 160 (H) 70 - 130 mg/dL Final     Comment:     Meter: [id number] : [id number] Micheal Santa       XR Chest 1 View  Narrative: XR CHEST 1 VW-     HISTORY: Male who is 72 years-old,  short of breath     TECHNIQUE: Frontal view of the chest     COMPARISON: 5/5/2023     FINDINGS: Heart, mediastinum and pulmonary vasculature are unremarkable.  Minimal likely atelectasis or scarring the right midlung. No focal  pulmonary consolidation, pleural effusion, or pneumothorax. Old  granulomatous disease is seen. No acute osseous process.     Impression: Minimal likely atelectasis or scarring the right midlung.  Follow-up as clinical indications persist.     This report was finalized on 5/7/2023 10:38 AM by Dr. Andrei Dumont M.D.       Scheduled Medications  arformoterol, 15 mcg, Nebulization, BID - RT  aspirin, 81 mg, Oral, Once  budesonide, 0.5 mg, Nebulization, Daily  cefTRIAXone, 1 g, Intravenous, Q24H  enoxaparin, 40 mg, Subcutaneous, Q24H  guaiFENesin, 1,200 mg, Oral, Q12H  insulin lispro, 0-7 Units,  Subcutaneous, TID With Meals  ipratropium-albuterol, 3 mL, Nebulization, 4x Daily - RT  levothyroxine, 75 mcg, Oral, Daily  methylPREDNISolone sodium succinate, 125 mg, Intravenous, Q12H  montelukast, 10 mg, Oral, Nightly  pravastatin, 40 mg, Oral, Daily  tamsulosin, 0.4 mg, Oral, Daily    Infusions   Diet  Diet: Cardiac Diets; Healthy Heart (2-3 Na+); Texture: Regular Texture (IDDSI 7); Fluid Consistency: Thin (IDDSI 0)       Assessment/Plan     Active Hospital Problems    Diagnosis  POA   • **Acute hypoxemic respiratory failure [J96.01]  Yes   • Elevated troponin [R77.8]  Unknown   • COPD exacerbation [J44.1]  Yes   • Acute on chronic respiratory failure with hypoxia [J96.21]  Yes   • Parainfluenza [B34.8]  Unknown   • Type 2 diabetes mellitus [E11.9]  Yes   • S/P coronary artery stent placement [Z95.5]  Not Applicable   • Hypertension [I10]  Yes      Resolved Hospital Problems   No resolved problems to display.       Patient is a 72-year-old male with history of COPD, chronic respiratory failure with hypoxia (on 2 L home O2), type 2 diabetes mellitus, CAD, hypertension who presents to [facility] with complaints of worsening dyspnea, cough, congestion over the past 4 to 5 days prior to arrival.     Acute on chronic respiratory failure with hypoxia secondary to Parainfluenza virus 3 and COPD with acute exacerbation  Patient with worsening shortness of breath and increased oxygen requirements.  -Discontinued prednisone, changed to IV Solu-Medrol.  Stat chest x-ray -Minimal likely atelectasis or scarring the right midlung, no other acute findings.  -WBC slightly up which is likely secondary to steroids, and procalcitonin mildly elevated 0.27.  However in the setting of increasing oxygen requirements worsening shortness of breath, will start on IV ceftriaxone for possible developing superimposed bacterial pneumonia.  -t pulmonology consult     CAD with elevated troponin  - Etiology of troponin elevation  likely 2/2 acute illness, no signs or symptoms of ACS at present.  - Cardiology consulted. Will continue to follow their plans/recommendations. Greatly appreciate their help.  - Telemetry monitoring.  -Troponin trended down, now within normal limits.  Cardiology evaluated.  Ischemic type II, no ACS.  No further work-up indicated.  -Monitor symptoms.    Hypertension: Not on BP meds at home per chart review.  Blood pressure softer side.  Continue to monitor.    Hyperlipidemia  - Stable. Continue Statin as prescribed.     BPH  - Stable. Continue Tamsulosin as prescribed.      Hyperglycemia secondary to steroids.  Hemoglobin A1c 5.7.  Continue sliding scale insulin.      Discussed with patient, spouse  Discussed with RN  DVT prophylax: Continue Lovenox  Disposition: To be determined.    I wore protective equipment throughout this patient encounter including a face mask, gloves and protective eyewear.  Hand hygiene was performed before donning protective equipment and after removal when leaving the room.      Dictated utilizing Dragon dictation        Valery Pond MD  [facility]  05/07/23  13:48 EDT

## 2023-05-07 NOTE — PLAN OF CARE
Goal Outcome Evaluation:  Plan of Care Reviewed With: patient        Progress: no change   Medicated for abdominal pain from coughing with good relief. Hycodan given for cough with good relief. O2 2L n/c. Saline nasal spray for nasal congestion. Rested well.

## 2023-05-07 NOTE — PLAN OF CARE
Goal Outcome Evaluation:  Plan of Care Reviewed With: patient        Progress: no change  Outcome Evaluation: VSS. Telemetry monitor, sb/sr. alert and oriented x4, 5 lpm via nc, oxygen sats currently 91%. falls precautions, bed alarm. ac/hs.

## 2023-05-07 NOTE — CONSULTS
Referring Provider: Dr. Pond  Reason for Consultation: COPD    Patient Care Team:  Lily Porter MD as PCP - General (Pediatrics)    Chief complaint:   Shortness of breath    History of present illness:    Subjective   This is a 72-year-old male patient, former smoker (60 packs year, stopped in 2010) with history of COPD and exertional/nocturnal hypoxemia, on oxygen 2 L/min on exertion and at night..  CAD s/p BMS x3..    He is on multiple inhalers and nebulizers at home and he seems to be very confused about his regimen.  I have previously seen him at [facility] office in 2020.    He presented to the hospital on 5/5 for shortness of breath and cough.  Symptoms started 4-5 days PTP.  It was not relieved by the use of azithromycin and prednisone taper.  On presentation to the ED, he had associated fever reaching 101.5 but has been afebrile for the last 2 days.  RVP was positive for parainfluenza.  Arturo SPO2 was 87%.    We are consulted today for worsening shortness of breath.  Cough is productive of moderate amount of yellowish phlegm.  He is on 5 L oxygen.    Review of Systems  Constitutional: No fever or chills.   ENMT: No sinus congestion  Cardiovascular: No chest pain, palpitation or legs swelling.    Respiratory: See above.  Gastrointestinal: Abdominal pain attributed to cough.  No diarrhea, nausea or vomiting.  Neurology: No headache, weakness, numbness or dizziness.   Musculoskeletal: No joints pain, stiffness or swelling.   Psychiatry: No depression.  Genitourinary: No dysuria or frequent urination  Endo: No weight changes. No cold or warm intolerance.  Lymphatic: No swollen glands.  Integumentary: No rash.    History  Past Medical History:   Diagnosis Date   • CAD (coronary artery disease)    • COPD (chronic obstructive pulmonary disease)    • Diabetes    • Disease of thyroid gland    • Elevated cholesterol    • Hyperlipidemia    • Hypertension    • Myocardial infarction 03/2011    tx with PCI    • Sinus bradycardia    • ST elevation myocardial infarction involving left anterior descending (LAD) coronary artery 2016    2011 tx with PCI   ,   Past Surgical History:   Procedure Laterality Date   • CARDIAC CATHETERIZATION      2011:3.0x15mm Vision pLAD; 3.5x20mm Vision p circ; 3.5x28mm Vision to mRCA   • CARPAL TUNNEL RELEASE Left    • CORONARY ANGIOPLASTY     • CORONARY STENT PLACEMENT      2011:3.0x15mm Vision pLAD; 3.5x20mm Vision p circ; 3.5x28mm Vision to mRCA   • JOINT REPLACEMENT     ,   Family History   Problem Relation Age of Onset   • No Known Problems Mother    • Cancer Father         lung cancer (smoker)    • No Known Problems Sister    • No Known Problems Brother    • No Known Problems Maternal Grandmother    • No Known Problems Maternal Grandfather    • No Known Problems Paternal Grandmother    • No Known Problems Paternal Grandfather    • No Known Problems Brother    • No Known Problems Sister    • Brain cancer Brother    • Heart attack Brother    ,   Social History     Socioeconomic History   • Marital status:    Tobacco Use   • Smoking status: Former     Packs/day: 2.00     Years: 30.00     Pack years: 60.00     Types: Cigarettes     Quit date: 2011     Years since quittin.8   • Smokeless tobacco: Never   • Tobacco comments:     caffeine use - coffee on occas.    Vaping Use   • Vaping Use: Never used   Substance and Sexual Activity   • Alcohol use: Yes     Alcohol/week: 1.0 standard drink     Types: 1 Cans of beer per week     Comment: beer --occ   • Drug use: No   • Sexual activity: Defer     E-cigarette/Vaping   • E-cigarette/Vaping Use Never User    • Passive Exposure No    • Counseling Given No      E-cigarette/Vaping Substances   • Nicotine No    • THC No    • CBD No    • Flavoring No      E-cigarette/Vaping Devices   • Disposable No    • Pre-filled or Refillable Cartridge No    • Refillable Tank No    • Pre-filled Pod No        ,   Medications Prior to Admission    Medication Sig Dispense Refill Last Dose   • albuterol (PROVENTIL HFA;VENTOLIN HFA) 108 (90 BASE) MCG/ACT inhaler Inhale 2 puffs Every 4 (Four) Hours As Needed for wheezing. 6.7 g 11    • aspirin 81 MG tablet Take 1 tablet by mouth Daily.      • budesonide (PULMICORT) 0.5 MG/2ML nebulizer solution Inhale 2 mL Daily.      • formoterol (PERFOROMIST) 20 MCG/2ML nebulizer solution Take  by nebulization 2 (Two) Times a Day.      • ipratropium-albuterol (DUO-NEB) 0.5-2.5 mg/3 ml nebulizer Take 3 mL by nebulization Every 4 (Four) Hours As Needed for Wheezing.      • levothyroxine (SYNTHROID, LEVOTHROID) 88 MCG tablet Take 75 mcg by mouth Daily.      • pravastatin (PRAVACHOL) 40 MG tablet Take 1 tablet by mouth Daily.      • Budeson-Glycopyrrol-Formoterol (BREZTRI AEROSPHERE IN) Inhale.      • meclizine (ANTIVERT) 25 MG tablet Take 25 mg by mouth As Needed for Dizziness.      • montelukast (SINGULAIR) 10 MG tablet Take 1 tablet by mouth Every Night.      • tamsulosin (FLOMAX) 0.4 MG capsule 24 hr capsule Take 1 capsule by mouth Daily.      • traZODone (DESYREL) 50 MG tablet Take 1 tablet by mouth Every Night.      , Scheduled Meds:  arformoterol, 15 mcg, Nebulization, BID - RT  aspirin, 81 mg, Oral, Once  budesonide, 0.5 mg, Nebulization, Daily  enoxaparin, 40 mg, Subcutaneous, Q24H  guaiFENesin, 1,200 mg, Oral, Q12H  insulin lispro, 0-7 Units, Subcutaneous, TID With Meals  ipratropium-albuterol, 3 mL, Nebulization, 4x Daily - RT  levothyroxine, 75 mcg, Oral, Daily  methylPREDNISolone sodium succinate, 125 mg, Intravenous, Q12H  montelukast, 10 mg, Oral, Nightly  pravastatin, 40 mg, Oral, Daily  tamsulosin, 0.4 mg, Oral, Daily    , Continuous Infusions:    and Allergies:  Patient has no known allergies.    Objective     Vital Signs   Temp:  [97 °F (36.1 °C)-99 °F (37.2 °C)] 99 °F (37.2 °C)  Heart Rate:  [] 102  Resp:  [18-20] 18  BP: ()/(64-69) 97/69    PPE used per hospital policy    Physical  Exam:  Constitutional: Not in acute distress.  Eyes: Injected conjunctivae, EOMI. pupils equal reactive to light.  ENMT: River 3. No oral thrush. Tonsils grade  Neck: Large. Trachea midline. No thyromegaly  Heart: RRR, no murmur  Lungs: Good and equal air entry bilaterally.  Non labored breathing.   Abdomen: Obese. Soft. No tenderness or dullness. No HSM.  Extremities: No cyanosis, clubbing or pitting edema.  Warm extremities and well-perfused.  Neuro: Conscious, alert, oriented x3.  Strength 5/5 in arms.  Psych: Appropriate mood and affect.    Integumentary: No rash.  Normal skin turgor  Lymphatic: No palpable cervical or supraclavicular lymph nodes.      Diagnostic imaging:  I personally and independently reviewed the following images:   CXR 5/7/2023: Pulmonary nodules on the right, probably calcified granulomas.    Laboratory workup:    Results from last 7 days   Lab Units 05/07/23  0513 05/06/23 0625 05/05/23 1817   SODIUM mmol/L 132* 133* 138   POTASSIUM mmol/L 4.4 4.4 4.2   CHLORIDE mmol/L 98 100 99   CO2 mmol/L 26.3 23.3 30.0*   BUN mg/dL 32* 32* 34*   CREATININE mg/dL 1.05 1.02 1.18   GLUCOSE mg/dL 118* 179* 111*   CALCIUM mg/dL 9.0 8.8 9.0     Results from last 7 days   Lab Units 05/06/23  0625 05/05/23 2052 05/05/23 1817   HSTROP T ng/L 9 23* 16*     Results from last 7 days   Lab Units 05/07/23  0513 05/06/23  0625 05/05/23 1817   WBC 10*3/mm3 13.91* 11.29* 12.07*   HEMOGLOBIN g/dL 14.2 14.8 17.1   HEMATOCRIT % 39.5 41.9 48.4   PLATELETS 10*3/mm3 185 162 203     Results from last 7 days   Lab Units 05/05/23 1817   INR  1.00   APTT seconds 24.1           Assessment     1. COPD exacerbation  2. Acute parainfluenza bronchitis  3. Acute hypoxic respiratory failure  4. Calcified granulomas right upper and RML on CXR and also present on prior CT chest in 2020    Recommendations:    · Oxygen by NC and titrate to keep SPO2 >90%  · Agree with changing steroids to high-dose IV Solu-Medrol 125 mg twice  daily  · Brovana and Pulmicort twice a day.  · DuoNeb 4 times a day  · Mucinex to improve mucus clearance  · Supportive treatment for parainfluenza infection    Vernon Whitmore MD  05/07/23  10:10 EDT

## 2023-05-08 ENCOUNTER — APPOINTMENT (OUTPATIENT)
Dept: GENERAL RADIOLOGY | Facility: HOSPITAL | Age: 73
End: 2023-05-08
Payer: MEDICARE

## 2023-05-08 LAB
ANION GAP SERPL CALCULATED.3IONS-SCNC: 10.2 MMOL/L (ref 5–15)
ARTERIAL PATENCY WRIST A: POSITIVE
ATMOSPHERIC PRESS: 743.5 MMHG
BASE EXCESS BLDA CALC-SCNC: 0.4 MMOL/L (ref 0–2)
BASOPHILS # BLD AUTO: 0 10*3/MM3 (ref 0–0.2)
BASOPHILS NFR BLD AUTO: 0 % (ref 0–1.5)
BDY SITE: ABNORMAL
BUN SERPL-MCNC: 33 MG/DL (ref 8–23)
BUN/CREAT SERPL: 37.5 (ref 7–25)
CALCIUM SPEC-SCNC: 9 MG/DL (ref 8.6–10.5)
CHLORIDE SERPL-SCNC: 100 MMOL/L (ref 98–107)
CO2 SERPL-SCNC: 26.8 MMOL/L (ref 22–29)
CREAT SERPL-MCNC: 0.88 MG/DL (ref 0.76–1.27)
DEPRECATED RDW RBC AUTO: 45.6 FL (ref 37–54)
EGFRCR SERPLBLD CKD-EPI 2021: 90.2 ML/MIN/1.73
EOSINOPHIL # BLD AUTO: 0.01 10*3/MM3 (ref 0–0.4)
EOSINOPHIL NFR BLD AUTO: 0.1 % (ref 0.3–6.2)
ERYTHROCYTE [DISTWIDTH] IN BLOOD BY AUTOMATED COUNT: 13 % (ref 12.3–15.4)
GAS FLOW AIRWAY: 5 LPM
GLUCOSE BLDC GLUCOMTR-MCNC: 135 MG/DL (ref 70–130)
GLUCOSE BLDC GLUCOMTR-MCNC: 142 MG/DL (ref 70–130)
GLUCOSE BLDC GLUCOMTR-MCNC: 180 MG/DL (ref 70–130)
GLUCOSE BLDC GLUCOMTR-MCNC: 211 MG/DL (ref 70–130)
GLUCOSE SERPL-MCNC: 160 MG/DL (ref 65–99)
HCO3 BLDA-SCNC: 24.5 MMOL/L (ref 22–28)
HCT VFR BLD AUTO: 41.3 % (ref 37.5–51)
HGB BLD-MCNC: 14.5 G/DL (ref 13–17.7)
IMM GRANULOCYTES # BLD AUTO: 0.02 10*3/MM3 (ref 0–0.05)
IMM GRANULOCYTES NFR BLD AUTO: 0.2 % (ref 0–0.5)
LYMPHOCYTES # BLD AUTO: 0.63 10*3/MM3 (ref 0.7–3.1)
LYMPHOCYTES NFR BLD AUTO: 6.2 % (ref 19.6–45.3)
MCH RBC QN AUTO: 33.5 PG (ref 26.6–33)
MCHC RBC AUTO-ENTMCNC: 35.1 G/DL (ref 31.5–35.7)
MCV RBC AUTO: 95.4 FL (ref 79–97)
MODALITY: ABNORMAL
MONOCYTES # BLD AUTO: 0.37 10*3/MM3 (ref 0.1–0.9)
MONOCYTES NFR BLD AUTO: 3.6 % (ref 5–12)
NEUTROPHILS NFR BLD AUTO: 89.9 % (ref 42.7–76)
NEUTROPHILS NFR BLD AUTO: 9.2 10*3/MM3 (ref 1.7–7)
NRBC BLD AUTO-RTO: 0 /100 WBC (ref 0–0.2)
PCO2 BLDA: 37.3 MM HG (ref 35–45)
PH BLDA: 7.42 PH UNITS (ref 7.35–7.45)
PLATELET # BLD AUTO: 179 10*3/MM3 (ref 140–450)
PMV BLD AUTO: 10.1 FL (ref 6–12)
PO2 BLDA: 74 MM HG (ref 80–100)
POTASSIUM SERPL-SCNC: 4.2 MMOL/L (ref 3.5–5.2)
RBC # BLD AUTO: 4.33 10*6/MM3 (ref 4.14–5.8)
SAO2 % BLDCOA: 95.1 % (ref 92–99)
SODIUM SERPL-SCNC: 137 MMOL/L (ref 136–145)
TOTAL RATE: 24 BREATHS/MINUTE
WBC NRBC COR # BLD: 10.23 10*3/MM3 (ref 3.4–10.8)

## 2023-05-08 PROCEDURE — 94799 UNLISTED PULMONARY SVC/PX: CPT

## 2023-05-08 PROCEDURE — 25010000002 ENOXAPARIN PER 10 MG: Performed by: STUDENT IN AN ORGANIZED HEALTH CARE EDUCATION/TRAINING PROGRAM

## 2023-05-08 PROCEDURE — 97165 OT EVAL LOW COMPLEX 30 MIN: CPT

## 2023-05-08 PROCEDURE — 85025 COMPLETE CBC W/AUTO DIFF WBC: CPT | Performed by: STUDENT IN AN ORGANIZED HEALTH CARE EDUCATION/TRAINING PROGRAM

## 2023-05-08 PROCEDURE — 25010000002 CEFTRIAXONE PER 250 MG: Performed by: STUDENT IN AN ORGANIZED HEALTH CARE EDUCATION/TRAINING PROGRAM

## 2023-05-08 PROCEDURE — 80048 BASIC METABOLIC PNL TOTAL CA: CPT | Performed by: STUDENT IN AN ORGANIZED HEALTH CARE EDUCATION/TRAINING PROGRAM

## 2023-05-08 PROCEDURE — 82948 REAGENT STRIP/BLOOD GLUCOSE: CPT

## 2023-05-08 PROCEDURE — 63710000001 INSULIN LISPRO (HUMAN) PER 5 UNITS: Performed by: NURSE PRACTITIONER

## 2023-05-08 PROCEDURE — 25010000002 METHYLPREDNISOLONE PER 125 MG: Performed by: STUDENT IN AN ORGANIZED HEALTH CARE EDUCATION/TRAINING PROGRAM

## 2023-05-08 PROCEDURE — 94761 N-INVAS EAR/PLS OXIMETRY MLT: CPT

## 2023-05-08 PROCEDURE — 94664 DEMO&/EVAL PT USE INHALER: CPT

## 2023-05-08 PROCEDURE — 71045 X-RAY EXAM CHEST 1 VIEW: CPT

## 2023-05-08 PROCEDURE — 25010000002 MORPHINE PER 10 MG: Performed by: NURSE PRACTITIONER

## 2023-05-08 PROCEDURE — 82803 BLOOD GASES ANY COMBINATION: CPT

## 2023-05-08 PROCEDURE — 36600 WITHDRAWAL OF ARTERIAL BLOOD: CPT

## 2023-05-08 RX ORDER — TRAZODONE HYDROCHLORIDE 50 MG/1
25 TABLET ORAL NIGHTLY PRN
Status: DISCONTINUED | OUTPATIENT
Start: 2023-05-08 | End: 2023-05-10 | Stop reason: HOSPADM

## 2023-05-08 RX ORDER — METHYLPREDNISOLONE SODIUM SUCCINATE 40 MG/ML
40 INJECTION, POWDER, LYOPHILIZED, FOR SOLUTION INTRAMUSCULAR; INTRAVENOUS EVERY 12 HOURS
Status: COMPLETED | OUTPATIENT
Start: 2023-05-09 | End: 2023-05-09

## 2023-05-08 RX ORDER — BUDESONIDE 0.5 MG/2ML
0.5 INHALANT ORAL
Status: DISCONTINUED | OUTPATIENT
Start: 2023-05-08 | End: 2023-05-10 | Stop reason: HOSPADM

## 2023-05-08 RX ADMIN — ARFORMOTEROL TARTRATE 15 MCG: 15 SOLUTION RESPIRATORY (INHALATION) at 06:59

## 2023-05-08 RX ADMIN — MONTELUKAST SODIUM 10 MG: 10 TABLET, FILM COATED ORAL at 20:46

## 2023-05-08 RX ADMIN — BUDESONIDE 0.5 MG: 0.5 INHALANT ORAL at 20:16

## 2023-05-08 RX ADMIN — MORPHINE SULFATE 4 MG: 2 INJECTION, SOLUTION INTRAMUSCULAR; INTRAVENOUS at 20:35

## 2023-05-08 RX ADMIN — HYDROCODONE BITARTRATE AND HOMATROPINE METHYLBROMIDE 5 ML: 1.5; 5 SYRUP ORAL at 20:41

## 2023-05-08 RX ADMIN — GUAIFENESIN 1200 MG: 600 TABLET, EXTENDED RELEASE ORAL at 20:46

## 2023-05-08 RX ADMIN — METHYLPREDNISOLONE SODIUM SUCCINATE 125 MG: 125 INJECTION, POWDER, FOR SOLUTION INTRAMUSCULAR; INTRAVENOUS at 13:03

## 2023-05-08 RX ADMIN — ARFORMOTEROL TARTRATE 15 MCG: 15 SOLUTION RESPIRATORY (INHALATION) at 20:16

## 2023-05-08 RX ADMIN — PANTOPRAZOLE SODIUM 40 MG: 40 TABLET, DELAYED RELEASE ORAL at 05:58

## 2023-05-08 RX ADMIN — TAMSULOSIN HYDROCHLORIDE 0.4 MG: 0.4 CAPSULE ORAL at 13:05

## 2023-05-08 RX ADMIN — TRAZODONE HYDROCHLORIDE 25 MG: 50 TABLET ORAL at 22:59

## 2023-05-08 RX ADMIN — IPRATROPIUM BROMIDE AND ALBUTEROL SULFATE 3 ML: .5; 3 SOLUTION RESPIRATORY (INHALATION) at 11:00

## 2023-05-08 RX ADMIN — ENOXAPARIN SODIUM 40 MG: 100 INJECTION SUBCUTANEOUS at 16:39

## 2023-05-08 RX ADMIN — IPRATROPIUM BROMIDE AND ALBUTEROL SULFATE 3 ML: .5; 3 SOLUTION RESPIRATORY (INHALATION) at 15:23

## 2023-05-08 RX ADMIN — LEVOTHYROXINE SODIUM 75 MCG: 0.07 TABLET ORAL at 05:41

## 2023-05-08 RX ADMIN — METHYLPREDNISOLONE SODIUM SUCCINATE 125 MG: 125 INJECTION, POWDER, FOR SOLUTION INTRAMUSCULAR; INTRAVENOUS at 20:46

## 2023-05-08 RX ADMIN — GUAIFENESIN 1200 MG: 600 TABLET, EXTENDED RELEASE ORAL at 13:06

## 2023-05-08 RX ADMIN — PRAVASTATIN SODIUM 40 MG: 40 TABLET ORAL at 13:04

## 2023-05-08 RX ADMIN — BUDESONIDE 0.5 MG: 0.5 INHALANT ORAL at 07:03

## 2023-05-08 RX ADMIN — CEFTRIAXONE SODIUM 1 G: 1 INJECTION, POWDER, FOR SOLUTION INTRAMUSCULAR; INTRAVENOUS at 15:08

## 2023-05-08 NOTE — PROGRESS NOTES
Name: Db Hardin ADMIT: 2023   : 1950  PCP: Lily Porter MD    MRN: [id number] LOS: 3 days   AGE/SEX: 72 y.o. male  ROOM: [facility]     Subjective   Subjective   No acute events overnight.  Patient feels significantly better today, remains on 5 L nasal cannula.  Still coughing up a lot of sputum,.  Breath improved.  Denies chest pain, palpitations, fevers or chills.  Denies nausea or vomiting.      Review of Systems   As above  Objective   Objective   Vital Signs  Temp:  [97.5 °F (36.4 °C)-98.2 °F (36.8 °C)] 97.7 °F (36.5 °C)  Heart Rate:  [54-96] 73  Resp:  [18-20] 20  BP: ()/(61-89) 126/74  SpO2:  [89 %-96 %] 89 %  on  Flow (L/min):  [5] 5;   Device (Oxygen Therapy): humidified;nasal cannula  Body mass index is 24.75 kg/m².  Physical Exam    General: Alert, laying in bed, not in distress,  HEENT: Normocephalic, atraumatic  CV: RRR, no murmurs  Lungs: Bilateral expiratory wheezing, nonlabored breathing,  Abdomen: Soft, nontender, nondistended  Extremities: No significant peripheral edema , no cyanosis     Results Review     I reviewed the patient's new clinical results.  Results from last 7 days   Lab Units 23  0523  0523   WBC 10*3/mm3 10.23 13.91* 11.29* 12.07*   HEMOGLOBIN g/dL 14.5 14.2 14.8 17.1   PLATELETS 10*3/mm3 179 185 162 203     Results from last 7 days   Lab Units 23  0555 23  0513 23   SODIUM mmol/L 137 132* 133* 138   POTASSIUM mmol/L 4.2 4.4 4.4 4.2   CHLORIDE mmol/L 100 98 100 99   CO2 mmol/L 26.8 26.3 23.3 30.0*   BUN mg/dL 33* 32* 32* 34*   CREATININE mg/dL 0.88 1.05 1.02 1.18   GLUCOSE mg/dL 160* 118* 179* 111*   Estimated Creatinine Clearance: 73.5 mL/min (by C-G formula based on SCr of 0.88 mg/dL).  Results from last 7 days   Lab Units 23  1817   ALBUMIN g/dL 4.7   BILIRUBIN mg/dL 1.3*   ALK PHOS U/L 104   AST (SGOT) U/L 23   ALT (SGPT) U/L 21     Results from last 7 days   Lab  Units 05/08/23  0555 05/07/23  0513 05/06/23  0625 05/05/23  1817   CALCIUM mg/dL 9.0 9.0 8.8 9.0   ALBUMIN g/dL  --   --   --  4.7     Results from last 7 days   Lab Units 05/07/23  0513 05/05/23  1817   PROCALCITONIN ng/mL 0.27* 0.09   LACTATE mmol/L  --  1.9     COVID19   Date Value Ref Range Status   05/05/2023 Not Detected Not Detected - Ref. Range Final     Hemoglobin A1C   Date/Time Value Ref Range Status   05/06/2023 0625 5.70 (H) 4.80 - 5.60 % Final     Glucose   Date/Time Value Ref Range Status   05/08/2023 0634 142 (H) 70 - 130 mg/dL Final     Comment:     Meter: [id number] : [id number] Mariano Pretty NUVIA   05/07/2023 1953 184 (H) 70 - 130 mg/dL Final     Comment:     Meter: [id number] : [id number] Mariano Pretty NA   05/07/2023 1613 240 (H) 70 - 130 mg/dL Final     Comment:     Meter: [id number] : [id number] Ismael Soda CNA   05/07/2023 1117 92 70 - 130 mg/dL Final     Comment:     Meter: [id number] : [id number] Ismael Soda CNA   05/07/2023 0636 104 70 - 130 mg/dL Final     Comment:     Meter: [id number] : [id number] Dorothy Krysta FILEMON   05/06/2023 1940 205 (H) 70 - 130 mg/dL Final     Comment:     Meter: [id number] : [id number] Dorothy Krysta FILEMON   05/06/2023 1652 135 (H) 70 - 130 mg/dL Final     Comment:     Meter: [id number] : [id number] Jaskaran PEDERSEN       XR Chest 1 View  Narrative: XR CHEST 1 VW-     HISTORY: Male who is 72 years-old,  short of breath     TECHNIQUE: Frontal view of the chest     COMPARISON: 5/5/2023     FINDINGS: Heart, mediastinum and pulmonary vasculature are unremarkable.  Minimal likely atelectasis or scarring the right midlung. No focal  pulmonary consolidation, pleural effusion, or pneumothorax. Old  granulomatous disease is seen. No acute osseous process.     Impression: Minimal likely atelectasis or scarring the right midlung.  Follow-up as clinical indications persist.     This report was finalized on 5/7/2023 10:38 AM by Dr. Andrei LEYVA  MAGDI Dumont       Scheduled Medications  arformoterol, 15 mcg, Nebulization, BID - RT  aspirin, 81 mg, Oral, Once  budesonide, 0.5 mg, Nebulization, Daily  cefTRIAXone, 1 g, Intravenous, Q24H  enoxaparin, 40 mg, Subcutaneous, Q24H  guaiFENesin, 1,200 mg, Oral, Q12H  insulin lispro, 0-7 Units, Subcutaneous, TID With Meals  ipratropium-albuterol, 3 mL, Nebulization, 4x Daily - RT  levothyroxine, 75 mcg, Oral, Daily  methylPREDNISolone sodium succinate, 125 mg, Intravenous, Q12H  montelukast, 10 mg, Oral, Nightly  pantoprazole, 40 mg, Oral, Q AM  pravastatin, 40 mg, Oral, Daily  tamsulosin, 0.4 mg, Oral, Daily    Infusions   Diet  Diet: Cardiac Diets; Healthy Heart (2-3 Na+); Texture: Regular Texture (IDDSI 7); Fluid Consistency: Thin (IDDSI 0)       Assessment/Plan     Active Hospital Problems    Diagnosis  POA   • **Acute hypoxemic respiratory failure [J96.01]  Yes   • Elevated troponin [R77.8]  Unknown   • COPD exacerbation [J44.1]  Yes   • Acute on chronic respiratory failure with hypoxia [J96.21]  Yes   • Parainfluenza [B34.8]  Unknown   • Type 2 diabetes mellitus [E11.9]  Yes   • S/P coronary artery stent placement [Z95.5]  Not Applicable   • Hypertension [I10]  Yes      Resolved Hospital Problems   No resolved problems to display.       Patient is a 72-year-old male with history of COPD, chronic respiratory failure with hypoxia (on 2 L home O2), type 2 diabetes mellitus, CAD, hypertension who presents to [facility] with complaints of worsening dyspnea, cough, congestion over the past 4 to 5 days prior to arrival.     Acute on chronic respiratory failure with hypoxia secondary to Parainfluenza virus 3 and COPD with acute exacerbation  -Patient is on 2 L nasal cannula at baseline  Repeat chest x-ray 05/07-Minimal likely atelectasis or scarring the right midlung, no other acute findings.  -Continue IV steroids, breathing treatments, pulmonary hygiene.  Wean off O2 as able.  Continue IV ceftriaxone  for possible superimposed bacterial pneumonia.  The fact that patient's WBC actually improved with initiation of IV ceftriaxone given the patient was initiated on high-dose IV steroids, indicate that patient might have bacterial pneumonia.         CAD with elevated troponin  - Etiology of troponin elevation likely 2/2 acute illness, no signs or symptoms of ACS at present.  - Cardiology consulted. Will continue to follow their plans/recommendations. Greatly appreciate their help.  - Telemetry monitoring.  -Troponin trended down, now within normal limits.  Cardiology evaluated.  Ischemic type II, no ACS.  No further work-up indicated.  Signed off.  -Monitor symptoms.    Hypertension: Not on BP meds at home per chart review.  Blood pressure softer side.  Continue to monitor.    Hyperlipidemia  - Stable. Continue Statin as prescribed.     BPH  - Stable. Continue Tamsulosin as prescribed.      Hyperglycemia secondary to steroids.  Hemoglobin A1c 5.7.  Continue sliding scale insulin.      Discussed with patient, spouse  Discussed with RN  DVT prophylax: Lovenox  Disposition: Home, timing to be determined, likely in 2-3 days.    I wore protective equipment throughout this patient encounter including a face mask, gloves and protective eyewear.  Hand hygiene was performed before donning protective equipment and after removal when leaving the room.      Dictated utilizing Dragon dictation        Valery Pond MD  [facility]  05/08/23  09:41 EDT

## 2023-05-08 NOTE — THERAPY DISCHARGE NOTE
Acute Care - Occupational Therapy Discharge  [facility]    Patient Name: Db Hardin  : 1950    MRN: [id number]                              Today's Date: 2023       Admit Date: 2023    Visit Dx:     ICD-10-CM ICD-9-CM   1. Acute hypoxemic respiratory failure  J96.01 518.81   2. Fever in adult  R50.9 780.60   3. Parainfluenza infection  B34.8 078.89   4. Chronic obstructive pulmonary disease, unspecified COPD type  J44.9 496     Patient Active Problem List   Diagnosis   • S/P coronary artery stent placement   • Type 2 diabetes mellitus   • Hypertension   • DESI (acute kidney injury)   • CAD (coronary artery disease)   • Disorder of joint   • Chronic obstructive pulmonary disease   • Hyperlipidemia   • Knee osteoarthritis   • Chest pain   • Syncope and collapse   • Hyperthyroidism   • Status post left knee replacement   • Left knee pain   • Vertigo   • Dehydration   • Dyspnea on exertion   • Acute hypoxemic respiratory failure   • COPD exacerbation   • Acute on chronic respiratory failure with hypoxia   • Parainfluenza   • Elevated troponin     Past Medical History:   Diagnosis Date   • CAD (coronary artery disease)    • COPD (chronic obstructive pulmonary disease)    • Diabetes    • Disease of thyroid gland    • Elevated cholesterol    • Hyperlipidemia    • Hypertension    • Myocardial infarction 2011    tx with PCI   • Sinus bradycardia    • ST elevation myocardial infarction involving left anterior descending (LAD) coronary artery 2016 tx with PCI     Past Surgical History:   Procedure Laterality Date   • CARDIAC CATHETERIZATION      2011:3.0x15mm Vision pLAD; 3.5x20mm Vision p circ; 3.5x28mm Vision to mRCA   • CARPAL TUNNEL RELEASE Left    • CORONARY ANGIOPLASTY     • CORONARY STENT PLACEMENT      :3.0x15mm Vision pLAD; 3.5x20mm Vision p circ; 3.5x28mm Vision to mRCA   • JOINT REPLACEMENT        General Information     Row Name 23 1050          OT Time and Intention     Document Type discharge evaluation/summary  -CE     Mode of Treatment individual therapy;occupational therapy  -CE     Row Name 05/08/23 1050          General Information    Patient Profile Reviewed yes  -CE     Prior Level of Function independent:;ADL's;all household mobility  -CE     Existing Precautions/Restrictions no known precautions/restrictions  -CE     Row Name 05/08/23 1050          Living Environment    People in Home spouse  -CE     Row Name 05/08/23 1050          Home Main Entrance    Number of Stairs, Main Entrance none  -CE     Row Name 05/08/23 1050          Stairs Within Home, Primary    Stairs, Within Home, Primary flight to basement where laundry is located although pt states he does not need to use them  -CE     Row Name 05/08/23 1050          Cognition    Orientation Status (Cognition) oriented x 4  -CE     Row Name 05/08/23 1050          Safety Issues, Functional Mobility    Impairments Affecting Function (Mobility) endurance/activity tolerance  -CE           User Key  (r) = Recorded By, (t) = Taken By, (c) = Cosigned By    Initials Name Provider Type    CE Sejal Lantigua OT Occupational Therapist               Mobility/ADL's     Row Name 05/08/23 1052          Bed Mobility    Bed Mobility supine-sit  -CE     Supine-Sit Wheeler (Bed Mobility) independent  -CE     Row Name 05/08/23 1052          Transfers    Transfers sit-stand transfer  -CE     Row Name 05/08/23 1052          Sit-Stand Transfer    Sit-Stand Wheeler (Transfers) independent  -CE     Row Name 05/08/23 1052          Functional Mobility    Functional Mobility- Ind. Level independent;set up required  for mgt of lines/IV  -CE     Functional Mobility- Comment pt able to ambulate around bed to chair; declined any further mobility after becoming frustrated when RN attempting to give meds  -CE     Row Name 05/08/23 1052          Activities of Daily Living    BADL Assessment/Intervention lower body dressing  -CE     Row  Name 05/08/23 1052          Lower Body Dressing Assessment/Training    [place] Level (Lower Body Dressing) don;socks;set up;independent  -CE     Position (Lower Body Dressing) edge of bed sitting  -CE           User Key  (r) = Recorded By, (t) = Taken By, (c) = Cosigned By    Initials Name Provider Type    CE Sejal Lantigua OT Occupational Therapist               Obj/Interventions     Row Name 05/08/23 1053          Sensory Assessment (Somatosensory)    Sensory Assessment (Somatosensory) sensation intact  -CE     Row Name 05/08/23 1053          Vision Assessment/Intervention    Visual Impairment/Limitations WFL  -CE     Row Name 05/08/23 1053          Range of Motion Comprehensive    General Range of Motion no range of motion deficits identified  -CE     Row Name 05/08/23 1053          Strength Comprehensive (MMT)    General Manual Muscle Testing (MMT) Assessment no strength deficits identified  -CE           User Key  (r) = Recorded By, (t) = Taken By, (c) = Cosigned By    Initials Name Provider Type    CE Sejal Lantigua OT Occupational Therapist               Goals/Plan    No documentation.                Clinical Impression     Row Name 05/08/23 1054          Pain Assessment    Pretreatment Pain Rating 0/10 - no pain  -CE     Posttreatment Pain Rating 0/10 - no pain  -CE     Row Name 05/08/23 1054          Plan of Care Review    Plan of Care Reviewed With patient  -CE     Outcome Evaluation Pt seen for OT eval after admission with acute hypoxemic resp failure. Wife and dtr present and both helpful and reporting they are able to assist as needed upon d/c. Pt agreeable to mobilize and able to do so independently to get to chair. Pt becoming frustrated after meds presented and unable to shift attention to education on energy conservation (in prep for d/c to home) at this time. Pt and family agreeable to OT signing off and encouraged them to contact OT should any questions/concerns arise. Will complete  orders at this time.  -CE     Row Name 05/08/23 1054          Therapy Assessment/Plan (OT)    Criteria for Skilled Therapeutic Interventions Met (OT) no;no problems identified which require skilled intervention  -CE     Row Name 05/08/23 1054          Therapy Plan Review/Discharge Plan (OT)    Anticipated Discharge Disposition (OT) home  -CE     Row Name 05/08/23 1054          Vital Signs    Pre SpO2 (%) 92  -CE     O2 Delivery Pre Treatment supplemental O2  5Ls  -CE     Row Name 05/08/23 1054          Positioning and Restraints    Pre-Treatment Position in bed  -CE     Post Treatment Position chair  -CE     In Chair notified nsg;sitting;call light within reach;encouraged to call for assist;exit alarm on;with family/caregiver  -CE           User Key  (r) = Recorded By, (t) = Taken By, (c) = Cosigned By    Initials Name Provider Type    Sejal Mckeon OT Occupational Therapist               Outcome Measures     Row Name 05/08/23 1056          How much help from another is currently needed...    Putting on and taking off regular lower body clothing? 4  -CE     Bathing (including washing, rinsing, and drying) 4  -CE     Toileting (which includes using toilet bed pan or urinal) 4  -CE     Putting on and taking off regular upper body clothing 4  -CE     Taking care of personal grooming (such as brushing teeth) 4  -CE     Eating meals 4  -CE     AM-PAC 6 Clicks Score (OT) 24  -CE     Row Name 05/08/23 1056          Functional Assessment    Outcome Measure Options AM-PAC 6 Clicks Daily Activity (OT)  -CE           User Key  (r) = Recorded By, (t) = Taken By, (c) = Cosigned By    Initials Name Provider Type    Sejal Mckeon OT Occupational Therapist              Occupational Therapy Education     Title: PT OT SLP Therapies (Done)     Topic: Occupational Therapy (Done)     Point: ADL training (Done)     Description:   Instruct learner(s) on proper safety adaptation and remediation techniques during self care  or transfers.   Instruct in proper use of assistive devices.              Learning Progress Summary           Patient Acceptance, E, VU,NR by CE at 5/8/2023 1057                   Point: Home exercise program (Done)     Description:   Instruct learner(s) on appropriate technique for monitoring, assisting and/or progressing therapeutic exercises/activities.              Learning Progress Summary           Patient Acceptance, E, VU,NR by CE at 5/8/2023 1057                   Point: Precautions (Done)     Description:   Instruct learner(s) on prescribed precautions during self-care and functional transfers.              Learning Progress Summary           Patient Acceptance, E, VU,NR by CE at 5/8/2023 1057                               User Key     Initials Effective Dates Name Provider Type Discipline    CE 10/17/22 -  Sejal Lantigua OT Occupational Therapist OT              OT Recommendation and Plan     Plan of Care Review  Plan of Care Reviewed With: patient  Outcome Evaluation: Pt seen for OT eval after admission with acute hypoxemic resp failure. Wife and dtr present and both helpful and reporting they are able to assist as needed upon d/c. Pt agreeable to mobilize and able to do so independently to get to chair. Pt becoming frustrated after meds presented and unable to shift attention to education on energy conservation (in prep for d/c to home) at this time. Pt and family agreeable to OT signing off and encouraged them to contact OT should any questions/concerns arise. Will complete orders at this time.  Plan of Care Reviewed With: patient  Outcome Evaluation: Pt seen for OT eval after admission with acute hypoxemic resp failure. Wife and dtr present and both helpful and reporting they are able to assist as needed upon d/c. Pt agreeable to mobilize and able to do so independently to get to chair. Pt becoming frustrated after meds presented and unable to shift attention to education on energy conservation  (in prep for d/c to home) at this time. Pt and family agreeable to OT signing off and encouraged them to contact OT should any questions/concerns arise. Will complete orders at this time.     Time Calculation:    Time Calculation- OT     Row Name 05/08/23 1057             Time Calculation- OT    OT Start Time 0935  -CE      OT Stop Time 0950  -CE      OT Time Calculation (min) 15 min  -CE      OT Received On 05/08/23  -            User Key  (r) = Recorded By, (t) = Taken By, (c) = Cosigned By    Initials Name Provider Type    Sejal Mckeon OT Occupational Therapist              Therapy Charges for Today     Code Description Service Date Service Provider Modifiers Qty    04650219420 HC OT EVAL LOW COMPLEXITY 2 5/8/2023 Sejal Lantigua OT GO 1             OT Discharge Summary  Anticipated Discharge Disposition (OT): home    Sejal Lantigua OT  5/8/2023

## 2023-05-08 NOTE — NURSING NOTE
Pt has refused all medication this am. This RN tried to explain all the med and Pt refuses for RN to talk. Family is at Bedside. Will continue to monitor.

## 2023-05-08 NOTE — PLAN OF CARE
Goal Outcome Evaluation:  Plan of Care Reviewed With: patient        Progress: no change  Outcome Evaluation: Pt alert and oriented x4, VSS, on telemetry monitor, still sb/sr, on O2 support at 5 lpm via hfnc, w/ frequent coughing episodes, able to expectorate phlegm, falls prec, call button within reach, bed on low position.

## 2023-05-08 NOTE — PLAN OF CARE
Goal Outcome Evaluation:  Plan of Care Reviewed With: patient           Outcome Evaluation: Pt seen for OT eval after admission with acute hypoxemic resp failure. Wife and dtr present and both helpful and reporting they are able to assist as needed upon d/c. Pt agreeable to mobilize and able to do so independently to get to chair. Pt becoming frustrated after meds presented and unable to shift attention to education on energy conservation (in prep for d/c to home) at this time. Pt and family agreeable to OT signing off and encouraged them to contact OT should any questions/concerns arise. Will complete orders at this time.

## 2023-05-08 NOTE — DISCHARGE PLACEMENT REQUEST
"Db Nunes (72 y.o. Male)     Date of Birth   1950    Social Security Number       Address   [address]  [facility] [address]    Home Phone   [phone]    MRN   [id number]       [facility]    Marital Status                               Admission Date   5/5/23    Admission Type   Emergency    Admitting Provider   Lasha Iqbal DO    Attending Provider   Valery Pond MD    Department, Room/Bed   [address], [address]       Discharge Date       Discharge Disposition       Discharge Destination                               Attending Provider: Valery Pond MD    Allergies: No Known Allergies    Isolation: None   Infection: None   Code Status: CPR    Ht: 168.9 cm (66.5\")   Wt: 70.6 kg (155 lb 11.2 oz)    Admission Cmt: None   Principal Problem: Acute hypoxemic respiratory failure [J96.01]                 Active Insurance as of 5/5/2023     Primary Coverage     Payor Plan Insurance Group Employer/Plan Group    ANTHEM MEDICARE REPLACEMENT ANTHEM MEDICARE ADVANTAGE [id number]     Payor Plan Address Payor Plan Phone Number Payor Plan Fax Number Effective Dates    [address] [phone]  1/1/2022 - None Entered    [facility] [address]       Subscriber Name Subscriber Birth Date Member ID       DB NUNES E 1950 [id number]           Secondary Coverage     Payor Plan Insurance Group Employer/Plan Group    [place]      Payor Plan Address Payor Plan Phone Number Payor Plan Fax Number Effective Dates    [address] [phone]  2/10/2017 - None Entered    [facility] [address]       Subscriber Name Subscriber Birth Date Member ID       DB NUNES E 1950 [id number]                 Emergency Contacts      (Rel.) Home Phone Work Phone Mobile Phone    Carol Nunes (Spouse) [phone] -- [phone]    Khadar Cole (IN LAW) (Son) [phone] -- [phone]    Vicky Cole (Daughter) " [phone] -- [phone]    LETY NUNES (Son) [phone] -- --    STEVIE NUNES (DAUGHTER IN LAW) (Relative) [phone] -- --

## 2023-05-08 NOTE — PLAN OF CARE
Problem: Adult Inpatient Plan of Care  Goal: Plan of Care Review  Flowsheets (Taken 5/8/2023 1609)  Outcome Evaluation: VSS, Assist X1 , IV ABX. Wife at Bedside   Goal Outcome Evaluation:              Outcome Evaluation: VSS, Assist X1 , IV ABX. Wife at Bedside

## 2023-05-08 NOTE — CASE MANAGEMENT/SOCIAL WORK
Discharge Planning Assessment  [facility]     Patient Name: Db Hardin  MRN: [id number]  Today's Date: 5/8/2023    Admit Date: 5/5/2023    Plan: Home with wife   Discharge Needs Assessment     Row Name 05/08/23 1422       Living Environment    People in Home spouse    Current Living Arrangements home    Primary Care Provided by self    Family Caregiver if Needed spouse    Quality of Family Relationships involved;helpful    Able to Return to Prior Arrangements yes       Resource/Environmental Concerns    Resource/Environmental Concerns none       Transition Planning    Patient/Family Anticipates Transition to home with family    Patient/Family Anticipated Services at Transition none    Transportation Anticipated family or friend will provide       Discharge Needs Assessment    Readmission Within the Last 30 Days no previous admission in last 30 days    Equipment Currently Used at Home oxygen;walker, rolling    Concerns to be Addressed adjustment to diagnosis/illness    Anticipated Changes Related to Illness none    Equipment Needed After Discharge other (see comments)  would like POC               Discharge Plan     Row Name 05/08/23 1422       Plan    Plan Home with wife    Patient/Family in Agreement with Plan yes    Plan Comments Spoke to patient at bedside, face sheet and pharmacy information verified. Pt lives with his wife Carol in one level home with 2 steps to enter. He is IADL’s, he has oxygen continuously at 2L with portable transport tank through [employer], he has requested P.O.C., referral to [facility] to obtain equipment, he has used [facility] in the past and has no SNF history. He plans to return home with his daughter Maria De Jesus to transport, no anticipated needs.  CCP will follow - Theresa S.              Continued Care and Services - Admitted Since 5/5/2023     Durable Medical Equipment     Service Provider Request Status Selected Services Address Phone Fax Patient Preferred    [facility] Accepted N/A [id number], [facility] [address] [phone] --              Expected Discharge Date and Time     Expected Discharge Date Expected Discharge Time    May 10, 2023          Demographic Summary     Row Name 05/08/23 1421       General Information    Admission Type inpatient               Functional Status     Row Name 05/08/23 1421       Functional Status    Usual Activity Tolerance excellent    Current Activity Tolerance good       Assessment of Health Literacy    Health Literacy Good       Functional Status, IADL    Medications independent    Meal Preparation independent    Housekeeping independent    Laundry independent    Shopping independent       Mental Status    General Appearance WDL WDL       Mental Status Summary    Recent Changes in Mental Status/Cognitive Functioning no changes               Psychosocial    No documentation.                Abuse/Neglect    No documentation.                Legal     Row Name 05/08/23 1422       Financial/Legal    Who Manages Finances if Patient Unable wife               Substance Abuse    No documentation.                Patient Forms    No documentation.                   Theresa Pope RN

## 2023-05-08 NOTE — CONSULTS
5/8/23 1006: Consult to Infection Prevention nurse received. COVID-19 testing is negative with no documented exposures.  Follow Standard Precautions for Parainfluenza virus. DIDIER ValdesN, RN, CIC

## 2023-05-08 NOTE — PROGRESS NOTES
"                                              LOS: 3 days   Patient Care Team:  Lily Porter MD as PCP - General (Pediatrics)    Chief Complaint:  F/up COPD exacerbation and respiratory failure.    Subjective   Interval History  On oxygen 5 L/man.  Breathing slightly improved.  Continues to have cough, currently productive of whitish phlegm and it's easier to expectorate.    REVIEW OF SYSTEMS:     GASTROINTESTINAL: No anorexia, nausea, vomiting or diarrhea. No abdominal pain.  CONSTITUTIONAL: No fever or chills.     Ventilator/Non-Invasive Ventilation Settings (From admission, onward)    None                Physical Exam:     Vital Signs  Temp:  [97.1 °F (36.2 °C)-98.2 °F (36.8 °C)] 97.1 °F (36.2 °C)  Heart Rate:  [54-91] 91  Resp:  [16-20] 18  BP: ()/(61-77) 122/77    Intake/Output Summary (Last 24 hours) at 5/8/2023 1529  Last data filed at 5/8/2023 0422  Gross per 24 hour   Intake 360 ml   Output 1075 ml   Net -715 ml     Flowsheet Rows    Flowsheet Row First Filed Value   Admission Height 167.6 cm (66\") Documented at 05/05/2023 1745   Admission Weight 71.7 kg (158 lb) Documented at 05/05/2023 1745          PPE used per hospital policy    General Appearance:   Alert, cooperative, in no acute distress   ENMT:  Mallampati score 3, moist mucous membrane   Eyes:  Pupils equal and reactive to light. EOMI   Neck:   . Trachea midline. No thyromegaly.   Lungs:    Equal but diminished air entry overall.  Diffuse bilateral expiratory wheezing, slightly improved compared to yesterday    Heart:   Regular rhythm and normal rate, normal S1 and S2, no         murmur   Skin:   No rash or ecchymosis   Abdomen:    Obese. Soft. No tenderness. No HSM.   Neuro/psych:  Conscious, alert, oriented x3. Strength 5/5 in upper and lower  ext.  Appropriate mood and affect   Extremities:  No cyanosis, clubbing or edema.  Warm extremities and well-perfused          Results Review:        Results from last 7 days   Lab Units " 05/08/23 0555 05/07/23 0513 05/06/23 0625   SODIUM mmol/L 137 132* 133*   POTASSIUM mmol/L 4.2 4.4 4.4   CHLORIDE mmol/L 100 98 100   CO2 mmol/L 26.8 26.3 23.3   BUN mg/dL 33* 32* 32*   CREATININE mg/dL 0.88 1.05 1.02   GLUCOSE mg/dL 160* 118* 179*   CALCIUM mg/dL 9.0 9.0 8.8     Results from last 7 days   Lab Units 05/06/23 0625 05/05/23 2052 05/05/23 1817   HSTROP T ng/L 9 23* 16*     Results from last 7 days   Lab Units 05/08/23 0555 05/07/23 0513 05/06/23 0625   WBC 10*3/mm3 10.23 13.91* 11.29*   HEMOGLOBIN g/dL 14.5 14.2 14.8   HEMATOCRIT % 41.3 39.5 41.9   PLATELETS 10*3/mm3 179 185 162     Results from last 7 days   Lab Units 05/05/23 1817   INR  1.00   APTT seconds 24.1                       Results from last 7 days   Lab Units 05/06/23  0606   PH, ARTERIAL pH units 7.383   PCO2, ARTERIAL mm Hg 42.8   PO2 ART mm Hg 74.0*   FLOW RATE lpm 2   MODALITY  Cannula   O2 SATURATION CALC % 94.4         I reviewed the patient's new clinical results.        Medication Review:   arformoterol, 15 mcg, Nebulization, BID - RT  aspirin, 81 mg, Oral, Once  budesonide, 0.5 mg, Nebulization, BID - RT  cefTRIAXone, 1 g, Intravenous, Q24H  enoxaparin, 40 mg, Subcutaneous, Q24H  guaiFENesin, 1,200 mg, Oral, Q12H  insulin lispro, 0-7 Units, Subcutaneous, TID With Meals  ipratropium-albuterol, 3 mL, Nebulization, 4x Daily - RT  levothyroxine, 75 mcg, Oral, Daily  methylPREDNISolone sodium succinate, 125 mg, Intravenous, Q12H  montelukast, 10 mg, Oral, Nightly  pantoprazole, 40 mg, Oral, Q AM  pravastatin, 40 mg, Oral, Daily  tamsulosin, 0.4 mg, Oral, Daily          Assessment     1. COPD exacerbation  2. Acute parainfluenza bronchitis  3. Acute hypoxic respiratory failure  4. Calcified granulomas right upper and RML on CXR and also present on prior CT chest in 2020         Plan     · Reduce Solu-Medrol to 40 mg twice daily  · Brovana and Pulmicort twice a day  · DuoNeb 4 times a day  · Mucinex to improve mucus  clearance  · Oxygen by NC and titrate keep SPO2 >90%          Vernon Whitmore MD  05/08/23  15:29 EDT            This note was dictated utilizing Dragon dictation

## 2023-05-09 LAB
ANION GAP SERPL CALCULATED.3IONS-SCNC: 10 MMOL/L (ref 5–15)
BASOPHILS # BLD AUTO: 0.01 10*3/MM3 (ref 0–0.2)
BASOPHILS NFR BLD AUTO: 0.1 % (ref 0–1.5)
BUN SERPL-MCNC: 46 MG/DL (ref 8–23)
BUN/CREAT SERPL: 50 (ref 7–25)
CALCIUM SPEC-SCNC: 8.5 MG/DL (ref 8.6–10.5)
CHLORIDE SERPL-SCNC: 101 MMOL/L (ref 98–107)
CO2 SERPL-SCNC: 26 MMOL/L (ref 22–29)
CREAT SERPL-MCNC: 0.92 MG/DL (ref 0.76–1.27)
DEPRECATED RDW RBC AUTO: 47.2 FL (ref 37–54)
EGFRCR SERPLBLD CKD-EPI 2021: 87.3 ML/MIN/1.73
EOSINOPHIL # BLD AUTO: 0 10*3/MM3 (ref 0–0.4)
EOSINOPHIL NFR BLD AUTO: 0 % (ref 0.3–6.2)
ERYTHROCYTE [DISTWIDTH] IN BLOOD BY AUTOMATED COUNT: 13.2 % (ref 12.3–15.4)
GLUCOSE BLDC GLUCOMTR-MCNC: 138 MG/DL (ref 70–130)
GLUCOSE BLDC GLUCOMTR-MCNC: 146 MG/DL (ref 70–130)
GLUCOSE BLDC GLUCOMTR-MCNC: 160 MG/DL (ref 70–130)
GLUCOSE BLDC GLUCOMTR-MCNC: 164 MG/DL (ref 70–130)
GLUCOSE SERPL-MCNC: 144 MG/DL (ref 65–99)
HCT VFR BLD AUTO: 39.2 % (ref 37.5–51)
HGB BLD-MCNC: 13.7 G/DL (ref 13–17.7)
IMM GRANULOCYTES # BLD AUTO: 0.06 10*3/MM3 (ref 0–0.05)
IMM GRANULOCYTES NFR BLD AUTO: 0.5 % (ref 0–0.5)
LYMPHOCYTES # BLD AUTO: 0.74 10*3/MM3 (ref 0.7–3.1)
LYMPHOCYTES NFR BLD AUTO: 6.5 % (ref 19.6–45.3)
MCH RBC QN AUTO: 33.7 PG (ref 26.6–33)
MCHC RBC AUTO-ENTMCNC: 34.9 G/DL (ref 31.5–35.7)
MCV RBC AUTO: 96.6 FL (ref 79–97)
MONOCYTES # BLD AUTO: 0.54 10*3/MM3 (ref 0.1–0.9)
MONOCYTES NFR BLD AUTO: 4.8 % (ref 5–12)
NEUTROPHILS NFR BLD AUTO: 10 10*3/MM3 (ref 1.7–7)
NEUTROPHILS NFR BLD AUTO: 88.1 % (ref 42.7–76)
NRBC BLD AUTO-RTO: 0 /100 WBC (ref 0–0.2)
PLATELET # BLD AUTO: 190 10*3/MM3 (ref 140–450)
PMV BLD AUTO: 9.7 FL (ref 6–12)
POTASSIUM SERPL-SCNC: 4.4 MMOL/L (ref 3.5–5.2)
RBC # BLD AUTO: 4.06 10*6/MM3 (ref 4.14–5.8)
SODIUM SERPL-SCNC: 137 MMOL/L (ref 136–145)
WBC NRBC COR # BLD: 11.35 10*3/MM3 (ref 3.4–10.8)

## 2023-05-09 PROCEDURE — 25010000002 ENOXAPARIN PER 10 MG: Performed by: STUDENT IN AN ORGANIZED HEALTH CARE EDUCATION/TRAINING PROGRAM

## 2023-05-09 PROCEDURE — 25010000002 METHYLPREDNISOLONE PER 40 MG: Performed by: INTERNAL MEDICINE

## 2023-05-09 PROCEDURE — 85025 COMPLETE CBC W/AUTO DIFF WBC: CPT | Performed by: STUDENT IN AN ORGANIZED HEALTH CARE EDUCATION/TRAINING PROGRAM

## 2023-05-09 PROCEDURE — 80048 BASIC METABOLIC PNL TOTAL CA: CPT | Performed by: STUDENT IN AN ORGANIZED HEALTH CARE EDUCATION/TRAINING PROGRAM

## 2023-05-09 PROCEDURE — 63710000001 INSULIN LISPRO (HUMAN) PER 5 UNITS: Performed by: NURSE PRACTITIONER

## 2023-05-09 PROCEDURE — 94799 UNLISTED PULMONARY SVC/PX: CPT

## 2023-05-09 PROCEDURE — 94761 N-INVAS EAR/PLS OXIMETRY MLT: CPT

## 2023-05-09 PROCEDURE — 94664 DEMO&/EVAL PT USE INHALER: CPT

## 2023-05-09 PROCEDURE — 82948 REAGENT STRIP/BLOOD GLUCOSE: CPT

## 2023-05-09 PROCEDURE — 25010000002 CEFTRIAXONE PER 250 MG: Performed by: STUDENT IN AN ORGANIZED HEALTH CARE EDUCATION/TRAINING PROGRAM

## 2023-05-09 RX ORDER — OXYCODONE HYDROCHLORIDE AND ACETAMINOPHEN 5; 325 MG/1; MG/1
1 TABLET ORAL EVERY 6 HOURS PRN
Status: DISCONTINUED | OUTPATIENT
Start: 2023-05-09 | End: 2023-05-10 | Stop reason: HOSPADM

## 2023-05-09 RX ORDER — AMOXICILLIN 250 MG
2 CAPSULE ORAL 2 TIMES DAILY
Status: DISCONTINUED | OUTPATIENT
Start: 2023-05-09 | End: 2023-05-10 | Stop reason: HOSPADM

## 2023-05-09 RX ORDER — PREDNISONE 20 MG/1
40 TABLET ORAL
Status: DISCONTINUED | OUTPATIENT
Start: 2023-05-10 | End: 2023-05-10 | Stop reason: HOSPADM

## 2023-05-09 RX ORDER — POLYETHYLENE GLYCOL 3350 17 G/17G
17 POWDER, FOR SOLUTION ORAL DAILY
Status: DISCONTINUED | OUTPATIENT
Start: 2023-05-09 | End: 2023-05-10 | Stop reason: HOSPADM

## 2023-05-09 RX ORDER — AMOXICILLIN 250 MG
2 CAPSULE ORAL 2 TIMES DAILY
Status: DISCONTINUED | OUTPATIENT
Start: 2023-05-09 | End: 2023-05-09

## 2023-05-09 RX ADMIN — GUAIFENESIN 1200 MG: 600 TABLET, EXTENDED RELEASE ORAL at 10:00

## 2023-05-09 RX ADMIN — PANTOPRAZOLE SODIUM 40 MG: 40 TABLET, DELAYED RELEASE ORAL at 05:50

## 2023-05-09 RX ADMIN — Medication 10 ML: at 21:30

## 2023-05-09 RX ADMIN — METHYLPREDNISOLONE SODIUM SUCCINATE 40 MG: 40 INJECTION, POWDER, FOR SOLUTION INTRAMUSCULAR; INTRAVENOUS at 21:30

## 2023-05-09 RX ADMIN — POLYETHYLENE GLYCOL 3350 17 G: 17 POWDER, FOR SOLUTION ORAL at 16:25

## 2023-05-09 RX ADMIN — INSULIN LISPRO 2 UNITS: 100 INJECTION, SOLUTION INTRAVENOUS; SUBCUTANEOUS at 12:15

## 2023-05-09 RX ADMIN — GUAIFENESIN 1200 MG: 600 TABLET, EXTENDED RELEASE ORAL at 21:30

## 2023-05-09 RX ADMIN — DOCUSATE SODIUM 50MG AND SENNOSIDES 8.6MG 2 TABLET: 8.6; 5 TABLET, FILM COATED ORAL at 16:24

## 2023-05-09 RX ADMIN — OXYCODONE AND ACETAMINOPHEN 1 TABLET: 5; 325 TABLET ORAL at 12:14

## 2023-05-09 RX ADMIN — BUDESONIDE 0.5 MG: 0.5 INHALANT ORAL at 20:10

## 2023-05-09 RX ADMIN — ENOXAPARIN SODIUM 40 MG: 100 INJECTION SUBCUTANEOUS at 15:24

## 2023-05-09 RX ADMIN — ARFORMOTEROL TARTRATE 15 MCG: 15 SOLUTION RESPIRATORY (INHALATION) at 07:18

## 2023-05-09 RX ADMIN — TAMSULOSIN HYDROCHLORIDE 0.4 MG: 0.4 CAPSULE ORAL at 10:00

## 2023-05-09 RX ADMIN — INSULIN LISPRO 2 UNITS: 100 INJECTION, SOLUTION INTRAVENOUS; SUBCUTANEOUS at 16:24

## 2023-05-09 RX ADMIN — ARFORMOTEROL TARTRATE 15 MCG: 15 SOLUTION RESPIRATORY (INHALATION) at 20:10

## 2023-05-09 RX ADMIN — LEVOTHYROXINE SODIUM 75 MCG: 0.07 TABLET ORAL at 05:50

## 2023-05-09 RX ADMIN — CEFTRIAXONE SODIUM 1 G: 1 INJECTION, POWDER, FOR SOLUTION INTRAMUSCULAR; INTRAVENOUS at 15:24

## 2023-05-09 RX ADMIN — BUDESONIDE 0.5 MG: 0.5 INHALANT ORAL at 07:20

## 2023-05-09 RX ADMIN — IPRATROPIUM BROMIDE AND ALBUTEROL SULFATE 3 ML: .5; 3 SOLUTION RESPIRATORY (INHALATION) at 11:32

## 2023-05-09 RX ADMIN — METHYLPREDNISOLONE SODIUM SUCCINATE 40 MG: 40 INJECTION, POWDER, FOR SOLUTION INTRAMUSCULAR; INTRAVENOUS at 10:00

## 2023-05-09 RX ADMIN — PRAVASTATIN SODIUM 40 MG: 40 TABLET ORAL at 10:00

## 2023-05-09 RX ADMIN — IPRATROPIUM BROMIDE AND ALBUTEROL SULFATE 3 ML: .5; 3 SOLUTION RESPIRATORY (INHALATION) at 15:40

## 2023-05-09 NOTE — PROGRESS NOTES
Name: Db Hardin ADMIT: 2023   : 1950  PCP: Lily Porter MD    MRN: [id number] LOS: 4 days   AGE/SEX: 72 y.o. male  ROOM: [facility]     Subjective   Subjective   Feeling better.  Shortness of breath improved.  Patient was having significant cough and pain  yesterday, received Hycodan and IV morphine, with improvement of symptoms.    Review of Systems   As above  Objective   Objective   Vital Signs  Temp:  [97.5 °F (36.4 °C)-98.6 °F (37 °C)] 97.5 °F (36.4 °C)  Heart Rate:  [55-91] 71  Resp:  [16-18] 16  BP: (119-122)/(67-81) 122/79  SpO2:  [91 %-98 %] 91 %  on  Flow (L/min):  [2-5] 2;   Device (Oxygen Therapy): high-flow nasal cannula;humidified  Body mass index is 24.85 kg/m².  Physical Exam    General: Alert, laying in bed, cooperative, chronically ill-appearing  HEENT: Normocephalic, atraumatic  CV: RRR, no murmurs  Lungs: Bilateral expiratory wheezing, nonlabored breathing,  Abdomen: Soft, nontender, nondistended  Extremities: No significant peripheral edema , no cyanosis     Results Review     I reviewed the patient's new clinical results.  Results from last 7 days   Lab Units 23  0636 23  0555 23  0513 23  0625   WBC 10*3/mm3 11.35* 10.23 13.91* 11.29*   HEMOGLOBIN g/dL 13.7 14.5 14.2 14.8   PLATELETS 10*3/mm3 190 179 185 162     Results from last 7 days   Lab Units 23  0636 23  0555 23  0513 23  0625   SODIUM mmol/L 137 137 132* 133*   POTASSIUM mmol/L 4.4 4.2 4.4 4.4   CHLORIDE mmol/L 101 100 98 100   CO2 mmol/L 26.0 26.8 26.3 23.3   BUN mg/dL 46* 33* 32* 32*   CREATININE mg/dL 0.92 0.88 1.05 1.02   GLUCOSE mg/dL 144* 160* 118* 179*   Estimated Creatinine Clearance: 70.6 mL/min (by C-G formula based on SCr of 0.92 mg/dL).  Results from last 7 days   Lab Units 23  1817   ALBUMIN g/dL 4.7   BILIRUBIN mg/dL 1.3*   ALK PHOS U/L 104   AST (SGOT) U/L 23   ALT (SGPT) U/L 21     Results from last 7 days   Lab Units 23  0636  05/08/23  0555 05/07/23  0513 05/06/23  0625 05/05/23  1817   CALCIUM mg/dL 8.5* 9.0 9.0 8.8 9.0   ALBUMIN g/dL  --   --   --   --  4.7     Results from last 7 days   Lab Units 05/07/23  0513 05/05/23  1817   PROCALCITONIN ng/mL 0.27* 0.09   LACTATE mmol/L  --  1.9     COVID19   Date Value Ref Range Status   05/05/2023 Not Detected Not Detected - Ref. Range Final     Glucose   Date/Time Value Ref Range Status   05/09/2023 1055 160 (H) 70 - 130 mg/dL Final     Comment:     Meter: [id number] : [id number] Livier Recio NA   05/09/2023 0557 138 (H) 70 - 130 mg/dL Final     Comment:     Meter: [id number] : [id number] Dona PEDERSEN   05/08/2023 1941 211 (H) 70 - 130 mg/dL Final     Comment:     Meter: [id number] : [id number] Dona Walter    05/08/2023 1642 180 (H) 70 - 130 mg/dL Final     Comment:     Meter: [id number] : [id number] Garthjerad Weeks NUVIA   05/08/2023 1130 135 (H) 70 - 130 mg/dL Final     Comment:     Meter: [id number] : [id number] Nat Weeks NA   05/08/2023 0634 142 (H) 70 - 130 mg/dL Final     Comment:     Meter: [id number] : [id number] Mariano PEDERSEN   05/07/2023 1953 184 (H) 70 - 130 mg/dL Final     Comment:     Meter: [id number] : [id number] Quintana Maria De Jesus NA       XR Chest 1 View  Narrative: XR CHEST 1 VW-  05/08/2023     HISTORY: Shortness of breath.     Heart size is within normal limits. Lungs appear free of acute  infiltrates. At least 2 tiny calcified granulomas are seen in the right  lung. There is some aortic calcification. 2 images are submitted.     Impression: 1. No acute process identified.     This report was finalized on 5/8/2023 9:56 PM by Dr. Saeed Campos M.D.       Scheduled Medications  arformoterol, 15 mcg, Nebulization, BID - RT  aspirin, 81 mg, Oral, Once  budesonide, 0.5 mg, Nebulization, BID - RT  cefTRIAXone, 1 g, Intravenous, Q24H  enoxaparin, 40 mg, Subcutaneous, Q24H  guaiFENesin, 1,200 mg, Oral, Q12H  insulin lispro, 0-7  Units, Subcutaneous, TID With Meals  ipratropium-albuterol, 3 mL, Nebulization, 4x Daily - RT  levothyroxine, 75 mcg, Oral, Daily  methylPREDNISolone sodium succinate, 40 mg, Intravenous, Q12H  pantoprazole, 40 mg, Oral, Q AM  pravastatin, 40 mg, Oral, Daily  tamsulosin, 0.4 mg, Oral, Daily    Infusions   Diet  Diet: Cardiac Diets; Healthy Heart (2-3 Na+); Texture: Regular Texture (IDDSI 7); Fluid Consistency: Thin (IDDSI 0)       Assessment/Plan     Active Hospital Problems    Diagnosis  POA   • **Acute hypoxemic respiratory failure [J96.01]  Yes   • Elevated troponin [R77.8]  Unknown   • COPD exacerbation [J44.1]  Yes   • Acute on chronic respiratory failure with hypoxia [J96.21]  Yes   • Parainfluenza [B34.8]  Unknown   • Type 2 diabetes mellitus [E11.9]  Yes   • S/P coronary artery stent placement [Z95.5]  Not Applicable   • Hypertension [I10]  Yes      Resolved Hospital Problems   No resolved problems to display.       Patient is a 72-year-old male with history of COPD, chronic respiratory failure with hypoxia (on 2 L home O2), type 2 diabetes mellitus, CAD, hypertension who presents to [facility] with complaints of worsening dyspnea, cough, congestion over the past 4 to 5 days prior to arrival.     Acute on chronic respiratory failure with hypoxia secondary to Parainfluenza virus 3 and COPD with acute exacerbation  -Patient is on 2 L nasal cannula at baseline  chest x-ray 05/07-Minimal likely atelectasis or scarring the right midlung, no other acute findings.  -Continue IV steroids, breathing treatments, pulmonary hygiene.  Wean off O2 as able.  Mucinex, as needed Hycodan for cough.  Continue IV ceftriaxone for possible superimposed bacterial pneumonia.    Wean O2 as able.           CAD with elevated troponin  - Etiology of troponin elevation likely 2/2 acute illness, no signs or symptoms of ACS at present.  - Cardiology consulted. Will continue to follow their plans/recommendations. Greatly  appreciate their help.  - Telemetry monitoring.  -Troponin trended down, now within normal limits.  Cardiology evaluated.  Ischemic type II, no ACS.  No further work-up indicated.  Signed off.  -Monitor symptoms.    Hypertension: Not on BP meds at home per chart review.  Blood pressure softer side.  Continue to monitor.    Hyperlipidemia  - Stable. Continue Statin as prescribed.     BPH  - Stable. Continue Tamsulosin as prescribed.      Hyperglycemia secondary to steroids.  Hemoglobin A1c 5.7.  Continue sliding scale insulin.      Discussed with patient, spouse  Discussed in  multidisciplinary rounds with nursing staff, CCP, pharmacy  DVT prophylax: Lovenox  Disposition: Home, timing to be determined, likely in 1-2 days  I wore protective equipment throughout this patient encounter including a face mask, gloves and protective eyewear.  Hand hygiene was performed before donning protective equipment and after removal when leaving the room.      Dictated utilizing Dragon dictation        Valery Pond MD  [facility]  05/09/23  14:37 EDT

## 2023-05-09 NOTE — PLAN OF CARE
Goal Outcome Evaluation:  Plan of Care Reviewed With: patient        Progress: improving  Outcome Evaluation: Pt. alert and oriented x4, VSS, telemetry monitor, SR, assist x1, wife at bedside, calm and cooperative after midnight, bed in low position, bed alarm on, call button within reach

## 2023-05-09 NOTE — NURSING NOTE
"Went into room for shift change to find the patient yelling/arguing with RT and wife at bedside.  Pt extremely anxious/SOA/and c/o abd pain.  States low abd pain is from coughing and \"the chili I ate earlier\".  States that he has been hurting like this \"for over 3 hours and that you are not doing a fucking thing about it\".  I looked over available meds.  Gave pt IV Morphine and PO Hycodan.  Encouraged RT at bedside to leave the room to help calm patient.  HR up to 140's, RR was over 40, and sats 86% to 94% on 5L NC.  Called house mgr to come speak with patient as he states he will \"see us in court\".    Pt calming down and able to talk without yelling.  States that pain meds helped some with abd pain.  Spoke with Dr Mcdaniel.  Will get Stat CXR and ABG.  Both came back mostly normal.  Will add Trazadone that he takes at home (but at lower dose).  Will continue to monitor.  "

## 2023-05-09 NOTE — PROGRESS NOTES
"Dr. BERNARDINO Mcdaniel    [address]        Patient ID:  Name:  Db Hardin  MRN:  [id number]  1950  72 y.o.  male            CC/Reason for visit: Parainfluenza bronchitis, COPD exacerbation    Interval hx: Called by nurse stating patient has become quite agitated, complaining that he could not breathe, complaining of severe abdominal pain every time he coughs.  Said he was going to dimitri everybody in the hospital and tired of coughing so much and being in pain and not receiving the care that he expects.  I came to evaluate the patient and he says he is feeling better now.  He has received Hycodan as well as IV morphine and feels like shortness of breath and cough has improved somewhat.  His wife was asking me to \"knock him out \"so he can stop coughing.  I explained to her that that is inappropriate, poor medical practice.  Chart reviewed including notes by Dr. Whitmore and hospitalist    ROS: Positive for severe cough and abdominal pain    I reviewed old medical records.  Past medical history, social history and family history: Unchanged from admission H&P.      Vitals:  Vitals:    05/08/23 1523 05/08/23 1528 05/08/23 1942 05/08/23 2016   BP:   119/69    BP Location:   Left arm    Patient Position:   Lying    Pulse: 86 91 87    Resp: 16 18 18 16   Temp:   98.5 °F (36.9 °C)    TempSrc:   Oral    SpO2: 92%  94%    Weight:       Height:               Body mass index is 24.75 kg/m².    Intake/Output Summary (Last 24 hours) at 5/8/2023 2237  Last data filed at 5/8/2023 0422  Gross per 24 hour   Intake --   Output 675 ml   Net -675 ml       Exam:  GEN:  Coughing  Alert, oriented x 4,  Moves all 4 extremities without focal deficits.  LUNGS: Scattered rhonchi bilat, no use of accessory muscles  CV:  Normal S1S2, without murmur, no edema  ABD:  Non tender, no enlarged liver or masses      Scheduled meds:  arformoterol, 15 mcg, Nebulization, BID - RT  aspirin, 81 mg, Oral, Once  budesonide, 0.5 mg, " Nebulization, BID - RT  cefTRIAXone, 1 g, Intravenous, Q24H  enoxaparin, 40 mg, Subcutaneous, Q24H  guaiFENesin, 1,200 mg, Oral, Q12H  insulin lispro, 0-7 Units, Subcutaneous, TID With Meals  ipratropium-albuterol, 3 mL, Nebulization, 4x Daily - RT  levothyroxine, 75 mcg, Oral, Daily  methylPREDNISolone sodium succinate, 125 mg, Intravenous, Q12H  montelukast, 10 mg, Oral, Nightly  pantoprazole, 40 mg, Oral, Q AM  pravastatin, 40 mg, Oral, Daily  tamsulosin, 0.4 mg, Oral, Daily      IV meds:                           Data Review:   I reviewed the patient's medications and new clinical results.    COVID19   Date Value Ref Range Status   05/05/2023 Not Detected Not Detected - Ref. Range Final         Lab Results   Component Value Date    CALCIUM 9.0 05/08/2023    MG 1.7 06/03/2017     Results from last 7 days   Lab Units 05/08/23  0555 05/07/23  0513 05/06/23  0625 05/05/23  1817   SODIUM mmol/L 137 132* 133* 138   POTASSIUM mmol/L 4.2 4.4 4.4 4.2   CHLORIDE mmol/L 100 98 100 99   CO2 mmol/L 26.8 26.3 23.3 30.0*   BUN mg/dL 33* 32* 32* 34*   CREATININE mg/dL 0.88 1.05 1.02 1.18   CALCIUM mg/dL 9.0 9.0 8.8 9.0   BILIRUBIN mg/dL  --   --   --  1.3*   ALK PHOS U/L  --   --   --  104   ALT (SGPT) U/L  --   --   --  21   AST (SGOT) U/L  --   --   --  23   GLUCOSE mg/dL 160* 118* 179* 111*   WBC 10*3/mm3 10.23 13.91* 11.29* 12.07*   HEMOGLOBIN g/dL 14.5 14.2 14.8 17.1   PLATELETS 10*3/mm3 179 185 162 203   INR   --   --   --  1.00   PROCALCITONIN ng/mL  --  0.27*  --  0.09     Results from last 7 days   Lab Units 05/05/23  1905 05/05/23  1820   BLOODCX  No growth at 3 days No growth at 3 days         Results from last 7 days   Lab Units 05/06/23  0625 05/05/23 2052 05/05/23 1817   HSTROP T ng/L 9 23* 16*     Results from last 7 days   Lab Units 05/08/23 2111 05/06/23  0606   PH, ARTERIAL pH units 7.425 7.383   PCO2, ARTERIAL mm Hg 37.3 42.8   PO2 ART mm Hg 74.0* 74.0*   FLOW RATE lpm 5 2   MODALITY  Cannula Cannula   O2  "SATURATION CALC % 95.1 94.4       Estimated Creatinine Clearance: 73.5 mL/min (by C-G formula based on SCr of 0.88 mg/dL).      ASSESSMENT:   Intractable cough  Acute hypoxemic respiratory failure    S/P coronary artery stent placement    Type 2 diabetes mellitus    Hypertension    COPD exacerbation    Acute on chronic respiratory failure with hypoxia    Parainfluenza causing acute bronchitis    Elevated troponin        PLAN:  The patient was quite agitated due to his cough.  I ordered IV morphine and Hycodan.  He has received these medications and feels a lot better now.  His wife asked me to \"knock him out \", but I told her that would be poor medical practice and unacceptable, clinically contraindicated.  I explained to him and her that they will have to put up with some coughing for at least 2 to 3 weeks while he battles parainfluenza virus causing significant inflammation of all his bronchioles.  I also told him that because of COPD, he will be unable to produce much sputum for the entirety of his COPD exacerbation which can last many weeks.  I told him that he is on maximum supportive care and adequate treatment in the hospital.  Resume home trazodone dose at half the dose at night.  I tried to set realistic expectations for him and his wife.  Higher dose of IV steroids is not going to speed up recovery from parainfluenza bronchitis.  It might actually cause more agitation.  He is also on nebulized steroids in the form of Pulmicort.  I will decrease his IV Solu-Medrol dose.  Avoid hyperoxia by lowering oxygen flow.  Aim for O2 saturations between 88 and 92% to prevent hypercapnia.  We will continue to follow        Yasir Mcdaniel MD  5/8/2023  "

## 2023-05-09 NOTE — PLAN OF CARE
Problem: Adult Inpatient Plan of Care  Goal: Plan of Care Review  5/9/2023 1555 by Samia Richards, RN  Flowsheets (Taken 5/9/2023 1555)  Outcome Evaluation: VSS, IV  ABX given, SR on monitor. 2LNC. Pain meds given, Call light within reach  5/9/2023 1321 by Samia Richards, RN  Outcome: Ongoing, Progressing   Goal Outcome Evaluation:              Outcome Evaluation: VSS, IV  ABX given, SR on monitor. 2LNC. Pain meds given, Call light within reach

## 2023-05-09 NOTE — PROGRESS NOTES
"                                              LOS: 4 days   Patient Care Team:  Lily Porter MD as PCP - General (Pediatrics)    Chief Complaint:  F/up COPD exacerbation and respiratory failure.    Subjective   Interval History  Oxygen requirement improved.  Currently on 2 L.  Continues to have cough now it slightly productive of whitish phlegm.  Dyspnea improved.    REVIEW OF SYSTEMS:     GASTROINTESTINAL: Constipation.  No nausea or vomiting.  CONSTITUTIONAL: No fever or chills.     Ventilator/Non-Invasive Ventilation Settings (From admission, onward)    None                Physical Exam:     Vital Signs  Temp:  [97.5 °F (36.4 °C)-98.6 °F (37 °C)] 97.5 °F (36.4 °C)  Heart Rate:  [55-87] 82  Resp:  [16-18] 18  BP: (119-122)/(67-81) 122/79    Intake/Output Summary (Last 24 hours) at 5/9/2023 1706  Last data filed at 5/9/2023 1352  Gross per 24 hour   Intake 240 ml   Output 680 ml   Net -440 ml     Flowsheet Rows    Flowsheet Row First Filed Value   Admission Height 167.6 cm (66\") Documented at 05/05/2023 1745   Admission Weight 71.7 kg (158 lb) Documented at 05/05/2023 1745          PPE used per hospital policy    General Appearance:   Alert, cooperative, in no acute distress   ENMT:  Mallampati score 3, moist mucous membrane   Eyes:  Pupils equal and reactive to light. EOMI   Neck:   . Trachea midline. No thyromegaly.   Lungs:    Diffuse bilateral expiratory wheezing, slightly improved compared to prior.    Heart:   Regular rhythm and normal rate, normal S1 and S2, no         murmur   Skin:   No rash or ecchymosis   Abdomen:    Obese. Soft. No tenderness. No HSM.   Neuro/psych:  Conscious, alert, oriented x3. Strength 5/5 in upper and lower  ext.  Appropriate mood and affect   Extremities:  No cyanosis, clubbing or edema.  Warm extremities and well-perfused          Results Review:        Results from last 7 days   Lab Units 05/09/23  0636 05/08/23  0555 05/07/23  0513   SODIUM mmol/L 137 137 132*   POTASSIUM " mmol/L 4.4 4.2 4.4   CHLORIDE mmol/L 101 100 98   CO2 mmol/L 26.0 26.8 26.3   BUN mg/dL 46* 33* 32*   CREATININE mg/dL 0.92 0.88 1.05   GLUCOSE mg/dL 144* 160* 118*   CALCIUM mg/dL 8.5* 9.0 9.0     Results from last 7 days   Lab Units 05/06/23  0625 05/05/23 2052 05/05/23  1817   HSTROP T ng/L 9 23* 16*     Results from last 7 days   Lab Units 05/09/23  0636 05/08/23  0555 05/07/23  0513   WBC 10*3/mm3 11.35* 10.23 13.91*   HEMOGLOBIN g/dL 13.7 14.5 14.2   HEMATOCRIT % 39.2 41.3 39.5   PLATELETS 10*3/mm3 190 179 185     Results from last 7 days   Lab Units 05/05/23  1817   INR  1.00   APTT seconds 24.1                       Results from last 7 days   Lab Units 05/08/23 2111 05/06/23  0606   PH, ARTERIAL pH units 7.425 7.383   PCO2, ARTERIAL mm Hg 37.3 42.8   PO2 ART mm Hg 74.0* 74.0*   FLOW RATE lpm 5 2   MODALITY  Cannula Cannula   O2 SATURATION CALC % 95.1 94.4         I reviewed the patient's new clinical results.        Medication Review:   arformoterol, 15 mcg, Nebulization, BID - RT  aspirin, 81 mg, Oral, Once  budesonide, 0.5 mg, Nebulization, BID - RT  cefTRIAXone, 1 g, Intravenous, Q24H  enoxaparin, 40 mg, Subcutaneous, Q24H  guaiFENesin, 1,200 mg, Oral, Q12H  insulin lispro, 0-7 Units, Subcutaneous, TID With Meals  ipratropium-albuterol, 3 mL, Nebulization, 4x Daily - RT  levothyroxine, 75 mcg, Oral, Daily  methylPREDNISolone sodium succinate, 40 mg, Intravenous, Q12H  pantoprazole, 40 mg, Oral, Q AM  polyethylene glycol, 17 g, Oral, Daily  pravastatin, 40 mg, Oral, Daily  senna-docusate sodium, 2 tablet, Oral, BID  tamsulosin, 0.4 mg, Oral, Daily          Assessment     1. COPD exacerbation  2. Acute parainfluenza bronchitis  3. Acute hypoxic respiratory failure  4. Calcified granulomas right upper and RML on CXR and also present on prior CT chest in 2020         Plan     · Transition to prednisone 40 mg daily in a.m. as his wheezing and oxygen requirement improved  · Brovana and Pulmicort twice a  day  · DuoNeb 4 times a day  · Mucinex to improve mucus clearance  · Oxygen by NC and titrate keep SPO2 >90%      Can perhaps be discharged home soon with prolonged steroid taper.    Vernon Whitmore MD  05/09/23  17:06 EDT            This note was dictated utilizing Dragon dictation

## 2023-05-10 ENCOUNTER — READMISSION MANAGEMENT (OUTPATIENT)
Dept: CALL CENTER | Facility: HOSPITAL | Age: 73
End: 2023-05-10
Payer: MEDICARE

## 2023-05-10 VITALS
WEIGHT: 153.44 LBS | DIASTOLIC BLOOD PRESSURE: 81 MMHG | OXYGEN SATURATION: 95 % | BODY MASS INDEX: 24.08 KG/M2 | HEIGHT: 67 IN | SYSTOLIC BLOOD PRESSURE: 133 MMHG | RESPIRATION RATE: 16 BRPM | HEART RATE: 64 BPM | TEMPERATURE: 97.8 F

## 2023-05-10 LAB
ANION GAP SERPL CALCULATED.3IONS-SCNC: 8.2 MMOL/L (ref 5–15)
BACTERIA SPEC AEROBE CULT: NORMAL
BACTERIA SPEC AEROBE CULT: NORMAL
BASOPHILS # BLD AUTO: 0.02 10*3/MM3 (ref 0–0.2)
BASOPHILS NFR BLD AUTO: 0.2 % (ref 0–1.5)
BUN SERPL-MCNC: 39 MG/DL (ref 8–23)
BUN/CREAT SERPL: 41.9 (ref 7–25)
CALCIUM SPEC-SCNC: 9.2 MG/DL (ref 8.6–10.5)
CHLORIDE SERPL-SCNC: 98 MMOL/L (ref 98–107)
CO2 SERPL-SCNC: 28.8 MMOL/L (ref 22–29)
CREAT SERPL-MCNC: 0.93 MG/DL (ref 0.76–1.27)
DEPRECATED RDW RBC AUTO: 47.4 FL (ref 37–54)
EGFRCR SERPLBLD CKD-EPI 2021: 86.2 ML/MIN/1.73
EOSINOPHIL # BLD AUTO: 0 10*3/MM3 (ref 0–0.4)
EOSINOPHIL NFR BLD AUTO: 0 % (ref 0.3–6.2)
ERYTHROCYTE [DISTWIDTH] IN BLOOD BY AUTOMATED COUNT: 13.3 % (ref 12.3–15.4)
GLUCOSE BLDC GLUCOMTR-MCNC: 131 MG/DL (ref 70–130)
GLUCOSE BLDC GLUCOMTR-MCNC: 180 MG/DL (ref 70–130)
GLUCOSE SERPL-MCNC: 130 MG/DL (ref 65–99)
HCT VFR BLD AUTO: 42.8 % (ref 37.5–51)
HGB BLD-MCNC: 14.7 G/DL (ref 13–17.7)
IMM GRANULOCYTES # BLD AUTO: 0.14 10*3/MM3 (ref 0–0.05)
IMM GRANULOCYTES NFR BLD AUTO: 1.1 % (ref 0–0.5)
LYMPHOCYTES # BLD AUTO: 0.9 10*3/MM3 (ref 0.7–3.1)
LYMPHOCYTES NFR BLD AUTO: 7.1 % (ref 19.6–45.3)
MCH RBC QN AUTO: 33.1 PG (ref 26.6–33)
MCHC RBC AUTO-ENTMCNC: 34.3 G/DL (ref 31.5–35.7)
MCV RBC AUTO: 96.4 FL (ref 79–97)
MONOCYTES # BLD AUTO: 0.77 10*3/MM3 (ref 0.1–0.9)
MONOCYTES NFR BLD AUTO: 6 % (ref 5–12)
NEUTROPHILS NFR BLD AUTO: 10.9 10*3/MM3 (ref 1.7–7)
NEUTROPHILS NFR BLD AUTO: 85.6 % (ref 42.7–76)
NRBC BLD AUTO-RTO: 0 /100 WBC (ref 0–0.2)
PLATELET # BLD AUTO: 212 10*3/MM3 (ref 140–450)
PMV BLD AUTO: 9.5 FL (ref 6–12)
POTASSIUM SERPL-SCNC: 4.9 MMOL/L (ref 3.5–5.2)
POTASSIUM SERPL-SCNC: 5.3 MMOL/L (ref 3.5–5.2)
RBC # BLD AUTO: 4.44 10*6/MM3 (ref 4.14–5.8)
SODIUM SERPL-SCNC: 135 MMOL/L (ref 136–145)
WBC NRBC COR # BLD: 12.73 10*3/MM3 (ref 3.4–10.8)

## 2023-05-10 PROCEDURE — 94799 UNLISTED PULMONARY SVC/PX: CPT

## 2023-05-10 PROCEDURE — 82948 REAGENT STRIP/BLOOD GLUCOSE: CPT

## 2023-05-10 PROCEDURE — 84132 ASSAY OF SERUM POTASSIUM: CPT | Performed by: STUDENT IN AN ORGANIZED HEALTH CARE EDUCATION/TRAINING PROGRAM

## 2023-05-10 PROCEDURE — 25010000002 ENOXAPARIN PER 10 MG: Performed by: STUDENT IN AN ORGANIZED HEALTH CARE EDUCATION/TRAINING PROGRAM

## 2023-05-10 PROCEDURE — 94761 N-INVAS EAR/PLS OXIMETRY MLT: CPT

## 2023-05-10 PROCEDURE — 25010000002 CEFTRIAXONE PER 250 MG: Performed by: STUDENT IN AN ORGANIZED HEALTH CARE EDUCATION/TRAINING PROGRAM

## 2023-05-10 PROCEDURE — 80048 BASIC METABOLIC PNL TOTAL CA: CPT | Performed by: STUDENT IN AN ORGANIZED HEALTH CARE EDUCATION/TRAINING PROGRAM

## 2023-05-10 PROCEDURE — 85025 COMPLETE CBC W/AUTO DIFF WBC: CPT | Performed by: STUDENT IN AN ORGANIZED HEALTH CARE EDUCATION/TRAINING PROGRAM

## 2023-05-10 PROCEDURE — 63710000001 PREDNISONE PER 1 MG: Performed by: INTERNAL MEDICINE

## 2023-05-10 PROCEDURE — 63710000001 INSULIN LISPRO (HUMAN) PER 5 UNITS: Performed by: NURSE PRACTITIONER

## 2023-05-10 PROCEDURE — 94664 DEMO&/EVAL PT USE INHALER: CPT

## 2023-05-10 RX ORDER — PREDNISONE 10 MG/1
TABLET ORAL
Qty: 34 TABLET | Refills: 0 | Status: SHIPPED | OUTPATIENT
Start: 2023-05-11 | End: 2023-05-24

## 2023-05-10 RX ORDER — AMOXICILLIN 250 MG
2 CAPSULE ORAL 2 TIMES DAILY
Qty: 120 TABLET | Refills: 0 | Status: SHIPPED | OUTPATIENT
Start: 2023-05-10 | End: 2023-06-09

## 2023-05-10 RX ORDER — HYDROCODONE BITARTRATE AND HOMATROPINE METHYLBROMIDE ORAL SOLUTION 5; 1.5 MG/5ML; MG/5ML
5 LIQUID ORAL EVERY 4 HOURS PRN
Qty: 90 ML | Refills: 0 | Status: SHIPPED | OUTPATIENT
Start: 2023-05-10 | End: 2023-05-13

## 2023-05-10 RX ORDER — CEFDINIR 300 MG/1
300 CAPSULE ORAL 2 TIMES DAILY
Qty: 2 CAPSULE | Refills: 0 | Status: SHIPPED | OUTPATIENT
Start: 2023-05-11 | End: 2023-05-12

## 2023-05-10 RX ORDER — GUAIFENESIN 600 MG/1
1200 TABLET, EXTENDED RELEASE ORAL EVERY 12 HOURS SCHEDULED
Qty: 40 TABLET | Refills: 0 | Status: SHIPPED | OUTPATIENT
Start: 2023-05-10 | End: 2023-05-20

## 2023-05-10 RX ORDER — PREDNISONE 10 MG/1
TABLET ORAL
Qty: 25 TABLET | Refills: 0 | Status: SHIPPED | OUTPATIENT
Start: 2023-05-11 | End: 2023-05-10 | Stop reason: SDUPTHER

## 2023-05-10 RX ADMIN — CEFTRIAXONE SODIUM 1 G: 1 INJECTION, POWDER, FOR SOLUTION INTRAMUSCULAR; INTRAVENOUS at 14:50

## 2023-05-10 RX ADMIN — INSULIN LISPRO 2 UNITS: 100 INJECTION, SOLUTION INTRAVENOUS; SUBCUTANEOUS at 12:11

## 2023-05-10 RX ADMIN — POLYETHYLENE GLYCOL 3350 17 G: 17 POWDER, FOR SOLUTION ORAL at 10:06

## 2023-05-10 RX ADMIN — BUDESONIDE 0.5 MG: 0.5 INHALANT ORAL at 07:49

## 2023-05-10 RX ADMIN — IPRATROPIUM BROMIDE AND ALBUTEROL SULFATE 3 ML: .5; 3 SOLUTION RESPIRATORY (INHALATION) at 11:31

## 2023-05-10 RX ADMIN — HYDROCODONE BITARTRATE AND HOMATROPINE METHYLBROMIDE 5 ML: 1.5; 5 SYRUP ORAL at 05:09

## 2023-05-10 RX ADMIN — DOCUSATE SODIUM 50MG AND SENNOSIDES 8.6MG 2 TABLET: 8.6; 5 TABLET, FILM COATED ORAL at 10:06

## 2023-05-10 RX ADMIN — GUAIFENESIN 1200 MG: 600 TABLET, EXTENDED RELEASE ORAL at 10:05

## 2023-05-10 RX ADMIN — LEVOTHYROXINE SODIUM 75 MCG: 0.07 TABLET ORAL at 05:09

## 2023-05-10 RX ADMIN — TAMSULOSIN HYDROCHLORIDE 0.4 MG: 0.4 CAPSULE ORAL at 10:07

## 2023-05-10 RX ADMIN — ARFORMOTEROL TARTRATE 15 MCG: 15 SOLUTION RESPIRATORY (INHALATION) at 07:47

## 2023-05-10 RX ADMIN — PRAVASTATIN SODIUM 40 MG: 40 TABLET ORAL at 10:08

## 2023-05-10 RX ADMIN — PANTOPRAZOLE SODIUM 40 MG: 40 TABLET, DELAYED RELEASE ORAL at 05:09

## 2023-05-10 RX ADMIN — ENOXAPARIN SODIUM 40 MG: 100 INJECTION SUBCUTANEOUS at 15:04

## 2023-05-10 RX ADMIN — PREDNISONE 40 MG: 20 TABLET ORAL at 10:07

## 2023-05-10 NOTE — PLAN OF CARE
Goal Outcome Evaluation:              Outcome Evaluation: Pt AOX4. VSS. 2L n/c.Bed alarm activated. No sign of respiratory distress. Denied any pain. Pt scheduled to be discharged today.

## 2023-05-10 NOTE — PLAN OF CARE
Goal Outcome Evaluation:  Plan of Care Reviewed With: patient        Progress: improving  Outcome Evaluation: Pt. alert and oriented, VSS, telemetry monitor, SR, O2 support at 2 lpm via NC, bed on low position, bed alarm on, call button within reach.

## 2023-05-10 NOTE — PLAN OF CARE
Problem: Adult Inpatient Plan of Care  Goal: Patient-Specific Goal (Individualized)  5/10/2023 1155 by Jessica Smith RN  Outcome: Met  5/10/2023 1149 by Jessica Smith RN  Outcome: Ongoing, Progressing  Goal: Absence of Hospital-Acquired Illness or Injury  5/10/2023 1155 by Jessica Smith RN  Outcome: Met  5/10/2023 1149 by Jessica Smith RN  Outcome: Ongoing, Progressing  Intervention: Identify and Manage Fall Risk  Recent Flowsheet Documentation  Taken 5/10/2023 1145 by Jessica Smith, RN  Safety Promotion/Fall Prevention:   toileting scheduled   safety round/check completed   room organization consistent   nonskid shoes/slippers when out of bed   muscle strengthening facilitated   mobility aid in reach   lighting adjusted   clutter free environment maintained   fall prevention program maintained   elopement precautions   assistive device/personal items within reach  Taken 5/10/2023 1000 by Jessica Smith RN  Safety Promotion/Fall Prevention:   toileting scheduled   safety round/check completed   room organization consistent   nonskid shoes/slippers when out of bed   muscle strengthening facilitated   mobility aid in reach   lighting adjusted   fall prevention program maintained   elopement precautions   assistive device/personal items within reach   activity supervised   clutter free environment maintained  Taken 5/10/2023 0800 by Jessica Smith RN  Safety Promotion/Fall Prevention:   toileting scheduled   safety round/check completed   room organization consistent   nonskid shoes/slippers when out of bed   muscle strengthening facilitated   mobility aid in reach   lighting adjusted   fall prevention program maintained   elopement precautions   clutter free environment maintained   assistive device/personal items within reach  Intervention: Prevent Skin Injury  Recent Flowsheet Documentation  Taken 5/10/2023 1145 by Jessica Smith, RN  Body Position: position changed independently  Taken  5/10/2023 1000 by Jessica Smith RN  Body Position: position changed independently  Intervention: Prevent and Manage VTE (Venous Thromboembolism) Risk  Recent Flowsheet Documentation  Taken 5/10/2023 1145 by Jessica Smith RN  Activity Management: activity encouraged  Taken 5/10/2023 1000 by Jessica Smith RN  Activity Management: activity encouraged  Taken 5/10/2023 0800 by Jessica Smith RN  Activity Management: activity encouraged  Range of Motion: active ROM (range of motion) encouraged  Intervention: Prevent Infection  Recent Flowsheet Documentation  Taken 5/10/2023 1145 by Jessica Smith RN  Infection Prevention:   personal protective equipment utilized   hand hygiene promoted   equipment surfaces disinfected   environmental surveillance performed   single patient room provided  Taken 5/10/2023 1000 by Jessica Smith RN  Infection Prevention:   environmental surveillance performed   equipment surfaces disinfected   hand hygiene promoted   personal protective equipment utilized   single patient room provided  Taken 5/10/2023 0800 by Jessica Smith RN  Infection Prevention:   hand hygiene promoted   equipment surfaces disinfected   environmental surveillance performed   personal protective equipment utilized   single patient room provided   Goal Outcome Evaluation:              Outcome Evaluation: Pt AOX4. VSS. 2L n/c.Bed alarm activated. No sign of respiratory distress. Denied any pain. Pt scheduled to be discharged today.

## 2023-05-10 NOTE — DISCHARGE SUMMARY
Patient Name: Db Hardin  : 1950  MRN: [id number]    Date of Admission: 2023  Date of Discharge:  5/10/2023  Primary Care Physician: Lily Porter MD      Chief Complaint:   Cough and URI      Discharge Diagnoses     Active Hospital Problems    Diagnosis  POA   • **Acute hypoxemic respiratory failure [J96.01]  Yes   • Elevated troponin [R77.8]  Unknown   • COPD exacerbation [J44.1]  Yes   • Acute on chronic respiratory failure with hypoxia [J96.21]  Yes   • Parainfluenza [B34.8]  Unknown   • Type 2 diabetes mellitus [E11.9]  Yes   • S/P coronary artery stent placement [Z95.5]  Not Applicable   • Hypertension [I10]  Yes      Resolved Hospital Problems   No resolved problems to display.        Hospital Course     Patient is a 72-year-old male with history of COPD, chronic respiratory failure with hypoxia (on 2 L home O2), type 2 diabetes mellitus, CAD, hypertension who presents to [facility] with complaints of worsening dyspnea, cough.  Respiratory panel was positive for parainfluenza.  Patient was diagnosed with acute on chronic respiratory failure with hypoxia secondary to COPD exacerbation in the setting of parainfluenza.  Pulmonology was consulted.  Initiated on IV methylprednisone, DuoNebs, formoterol nebulizer, scheduled Mucinex, as needed Hycodan for cough.  Initiated on IV ceftriaxone as well for possible superimposed bacterial pneumonia in setting of elevated procalcitonin.  Oxygen requirements gradually decreased, patient was back to 2 L nasal cannula which is his home dose.  Patient has also complained of chest pain, in the setting of history of CAD and elevated troponin on admission, cardiology was consulted.  No findings of ACS, symptomatology secondary to COPD exacerbation, and no ischemic work-up was indicated per cardiology.  Discharged home on steroid taper, scheduled Mucinex and as needed Hycodan for cough.  Patient to continue home breathing treatments with  nebulizer previously scheduled.          At the time of discharge patient was told to take all medications as prescribed, keep all follow-up appointments, and call their doctor or return to the hospital with any worsening or concerning symptoms.             Day of Discharge     Subjective:  No acute events overnight.  Continues to feel better, on 2 L nasal cannula which is his home dose.  Cough is improving, less productive, shortness of breath improving as well.  Denies chest pain, palpitation, fevers or chills.  Physical Exam:  Temp:  [97.3 °F (36.3 °C)-97.9 °F (36.6 °C)] 97.8 °F (36.6 °C)  Heart Rate:  [58-75] 64  Resp:  [16-18] 16  BP: (122-147)/(77-93) 133/81  Body mass index is 24.39 kg/m².  Physical Exam  General: Alert laying in bed, not in distress, chronically ill-appearing  HEENT: Normocephalic, atraumatic  CV: RRR, no murmurs  Lungs:mild Bilateral expiratory wheezing, nonlabored breathing, on 2 L nasal cannula  Abdomen: Soft, nontender, nondistended  Extremities: No significant peripheral edema , no cyanosis   Consultants     Consult Orders (all) (From admission, onward)     Start     Ordered    05/08/23 0952  Inpatient Infection Control Nurse Consult  Once        Provider:  (Not yet assigned)    05/08/23 0952    05/07/23 0928  Inpatient Pulmonology Consult  Once        Specialty:  Pulmonary Disease  Provider:  Fei Tran Jr., MD    05/07/23 0928    05/06/23 0702  Inpatient Cardiology Consult  IN AM        Specialty:  Cardiology  Provider:  Sidney Resendiz MD    05/06/23 0004    05/05/23 2013  A (on-call MD unless specified) Details  Once        Specialty:  Hospitalist  Provider:  (Not yet assigned)    05/05/23 2013              Procedures     * Surgery not found *      Imaging Results (All)     Procedure Component Value Units Date/Time    XR Chest 1 View [[id number]] Collected: 05/08/23 2153     Updated: 05/08/23 2159    Narrative:      XR CHEST 1 VW-  05/08/2023     HISTORY: Shortness of  breath.     Heart size is within normal limits. Lungs appear free of acute  infiltrates. At least 2 tiny calcified granulomas are seen in the right  lung. There is some aortic calcification. 2 images are submitted.       Impression:      1. No acute process identified.     This report was finalized on 5/8/2023 9:56 PM by Dr. Saeed Campos M.D.       XR Chest 1 View [[id number]] Collected: 05/07/23 1036     Updated: 05/07/23 1041    Narrative:      XR CHEST 1 VW-     HISTORY: Male who is 72 years-old,  short of breath     TECHNIQUE: Frontal view of the chest     COMPARISON: 5/5/2023     FINDINGS: Heart, mediastinum and pulmonary vasculature are unremarkable.  Minimal likely atelectasis or scarring the right midlung. No focal  pulmonary consolidation, pleural effusion, or pneumothorax. Old  granulomatous disease is seen. No acute osseous process.       Impression:      Minimal likely atelectasis or scarring the right midlung.  Follow-up as clinical indications persist.     This report was finalized on 5/7/2023 10:38 AM by Dr. Andrei Dumont M.D.       XR Chest 1 View [[id number]] Collected: 05/05/23 1904     Updated: 05/05/23 1907    Narrative:      XR CHEST 1 VW-     HISTORY: Male who is 72 years-old,  cough     TECHNIQUE: Frontal views of the chest     COMPARISON: 8/27/2020     FINDINGS: Heart, mediastinum and pulmonary vasculature are unremarkable.  No focal pulmonary consolidation, pleural effusion, or pneumothorax. ON  old granulomatous disease is seen. No acute osseous process.       Impression:      No focal pulmonary consolidation. Follow-up as clinical  indications persist.     This report was finalized on 5/5/2023 7:04 PM by Dr. Andrei Dumont M.D.           Results for orders placed during the hospital encounter of 06/02/17    Duplex Carotid Ultrasound CAR    Interpretation Summary  · Proximal right internal carotid artery mild stenosis.  · Proximal left internal carotid artery mild  stenosis.    Results for orders placed during the hospital encounter of 08/29/22    Adult Transthoracic Echo Complete W/ Cont if Necessary Per Protocol    Interpretation Summary  · Estimated right ventricular systolic pressure from tricuspid regurgitation is normal (<35 mmHg). Calculated right ventricular systolic pressure from tricuspid regurgitation is 22.8 mmHg.  · Calculated left ventricular EF = 58.9% Estimated left ventricular EF was in agreement with the calculated left ventricular EF. Left ventricular systolic function is normal.  · There is calcification of the aortic valve.  · Left ventricular diastolic function was normal.    Pertinent Labs     Results from last 7 days   Lab Units 05/10/23  0721 05/09/23  0636 05/08/23  0555 05/07/23  0513   WBC 10*3/mm3 12.73* 11.35* 10.23 13.91*   HEMOGLOBIN g/dL 14.7 13.7 14.5 14.2   PLATELETS 10*3/mm3 212 190 179 185     Results from last 7 days   Lab Units 05/10/23  1209 05/10/23  0721 05/09/23  0636 05/08/23  0555 05/07/23  0513   SODIUM mmol/L  --  135* 137 137 132*   POTASSIUM mmol/L 4.9 5.3* 4.4 4.2 4.4   CHLORIDE mmol/L  --  98 101 100 98   CO2 mmol/L  --  28.8 26.0 26.8 26.3   BUN mg/dL  --  39* 46* 33* 32*   CREATININE mg/dL  --  0.93 0.92 0.88 1.05   GLUCOSE mg/dL  --  130* 144* 160* 118*   Estimated Creatinine Clearance: 68.6 mL/min (by C-G formula based on SCr of 0.93 mg/dL).  Results from last 7 days   Lab Units 05/05/23  1817   ALBUMIN g/dL 4.7   BILIRUBIN mg/dL 1.3*   ALK PHOS U/L 104   AST (SGOT) U/L 23   ALT (SGPT) U/L 21     Results from last 7 days   Lab Units 05/10/23  0721 05/09/23  0636 05/08/23  0555 05/07/23  0513 05/06/23  0625 05/05/23  1817   CALCIUM mg/dL 9.2 8.5* 9.0 9.0   < > 9.0   ALBUMIN g/dL  --   --   --   --   --  4.7    < > = values in this interval not displayed.       Results from last 7 days   Lab Units 05/06/23  0625 05/05/23 2052 05/05/23 1817   HSTROP T ng/L 9 23* 16*           Invalid input(s): LDLCALC  Results from last 7  days   Lab Units 05/05/23  1905 05/05/23  1820   BLOODCX  No growth at 4 days No growth at 4 days     Results from last 7 days   Lab Units 05/05/23  1819   COVID19  Not Detected       Test Results Pending at Discharge     Pending Labs     Order Current Status    Blood Culture - Blood, Arm, Left Preliminary result    Blood Culture - Blood, Arm, Right Preliminary result          Discharge Details        Discharge Medications      New Medications      Instructions Start Date   cefdinir 300 MG capsule  Commonly known as: OMNICEF   300 mg, Oral, 2 Times Daily   Start Date: May 11, 2023     HYDROcodone Bit-Homatrop MBr 5-1.5 MG/5ML solution  Commonly known as: HYCODAN   5 mL, Oral, Every 4 Hours PRN      Mucus Relief 600 MG 12 hr tablet  Generic drug: guaiFENesin   1,200 mg, Oral, Every 12 Hours Scheduled      predniSONE 10 MG tablet  Commonly known as: DELTASONE   Take 4 tablets by mouth Daily for 4 days, THEN 3 tablets Daily for 3 days, THEN 2 tablets Daily for 3 days, THEN 1 tablet Daily for 3 days. Take All Doses With Breakfast.   Start Date: May 11, 2023     Senexon-S 8.6-50 MG per tablet  Generic drug: sennosides-docusate   2 tablets, Oral, 2 Times Daily, Hold for loose stools.         Continue These Medications      Instructions Start Date   albuterol sulfate  (90 Base) MCG/ACT inhaler  Commonly known as: PROVENTIL HFA;VENTOLIN HFA;PROAIR HFA   2 puffs, Inhalation, Every 4 Hours PRN      aspirin 81 MG tablet   81 mg, Oral, Daily      BREZTRI AEROSPHERE IN   Inhalation      budesonide 0.5 MG/2ML nebulizer solution  Commonly known as: PULMICORT   0.5 mg, Inhalation, Daily      formoterol 20 MCG/2ML nebulizer solution  Commonly known as: PERFOROMIST   Nebulization, 2 Times Daily - RT      ipratropium-albuterol 0.5-2.5 mg/3 ml nebulizer  Commonly known as: DUO-NEB   3 mL, Nebulization, Every 4 Hours PRN      levothyroxine 88 MCG tablet  Commonly known as: SYNTHROID, LEVOTHROID   75 mcg, Oral, Daily       meclizine 25 MG tablet  Commonly known as: ANTIVERT   25 mg, Oral, As Needed      montelukast 10 MG tablet  Commonly known as: SINGULAIR   10 mg, Oral, Nightly      pravastatin 40 MG tablet  Commonly known as: PRAVACHOL   40 mg, Oral, Daily      tamsulosin 0.4 MG capsule 24 hr capsule  Commonly known as: FLOMAX   1 capsule, Oral, Daily      traZODone 50 MG tablet  Commonly known as: DESYREL   50 mg, Oral, Nightly             No Known Allergies    Discharge Disposition:  Home or Self Care      Discharge Diet:  Diet Order   Procedures   • Diet: Cardiac Diets; Healthy Heart (2-3 Na+); Texture: Regular Texture (IDDSI 7); Fluid Consistency: Thin (IDDSI 0)       Discharge Activity:       CODE STATUS:    Code Status and Medical Interventions:   Ordered at: 05/06/23 0259     Code Status (Patient has no pulse and is not breathing):    CPR (Attempt to Resuscitate)     Medical Interventions (Patient has pulse or is breathing):    Full Support     Release to patient:    Routine Release       Future Appointments   Date Time Provider Department Center   6/12/2023  3:00 PM [facility] CT 2  [facility]   8/11/2023 11:40 AM Sidney Resendiz MD [facility]      Contact information for follow-up providers     Lily Porter MD Follow up in 1 week(s).    Specialty: Pediatrics  Contact information:  [address], [place]. \[facility].\"" [id number] [phone]                   Contact information for after-discharge care     Durable Medical Equipment     [facility]    Service: Durable Medical Equipment  Contact information:  [address]  [facility] [address]  [phone]                             Time Spent on Discharge:  Greater than 30 minutes      Valery Pond MD  [facility]  05/10/23  17:44 EDT

## 2023-05-11 NOTE — OUTREACH NOTE
Prep Survey    Flowsheet Row Responses   [facility] facility patient discharged from? [place]   Is LACE score < 7 ? No   Eligibility Readm Mgmt   Discharge diagnosis  COPD exacerbation   Does the patient have one of the following disease processes/diagnoses(primary or secondary)? COPD   Does the patient have Home health ordered? No   Is there a DME ordered? Yes   What DME was ordered? [employer]    Prep survey completed? Yes          Geraldine BURNHAM - Registered Nurse

## 2023-05-12 NOTE — PROGRESS NOTES
"Enter Query Response Below      Query Response: Hyperglycemia was expected             If applicable, please update the problem list.     Patient: Db Hardin        : 1950  Account: [id number]           Admit Date: 2023        How to Respond to this query:       a. Click New Note     b. Answer query within the yellow box.                c. Update the Problem List, if applicable.      If you have any questions about this query contact me at: [email or web address]         72 year old man with COPD, chronic respiratory failure, and DM2, admitted  with parainfluenza virus infection, COPD exacerbation, and acute on chronic respiratory failure. Progress notes  -  note: \"Hyperglycemia secondary to steroids.\"  Treatment included sliding scale insulin.     Please document if the hyperglycemia secondary to steroids can be further specified as:  - Hyperglycemia was expected  - Hyperglycemia was not expected  - Other- please specify_______  - Unable to determine    By submitting this query, we are merely seeking further clarification of documentation to accurately reflect all conditions that you are monitoring, evaluating, treating or that extend the hospitalization or utilize additional resources of care. Please utilize your independent clinical judgment when addressing the question(s) above.     This query and your response, once completed, will be entered into the legal medical record.    Sincerely,   Dilcia Bowers RN, BSN  [email or web address]  Clinical Documentation Integrity Program  "

## 2023-05-12 NOTE — CASE MANAGEMENT/SOCIAL WORK
Discharge Planning Assessment  [facility]     Patient Name: Db Hardin  MRN: [id number]  Today's Date: 5/12/2023    Admit Date: 5/5/2023    Plan: Home with wife   Discharge Needs Assessment    No documentation.                Discharge Plan     Row Name 05/12/23 1520       Plan    Final Discharge Disposition Code 01 - home or self-care    Final Note DC home              Continued Care and Services - Discharged on 5/10/2023 Admission date: 5/5/2023 - Discharge disposition: Home or Self Care    Durable Medical Equipment     Service Provider Request Status Selected Services Address Phone Fax Patient Preferred    [facility]  Selected Durable Medical Equipment [address] [address] [phone] --              Expected Discharge Date and Time     Expected Discharge Date Expected Discharge Time    May 10, 2023          Demographic Summary    No documentation.                Functional Status    No documentation.                Psychosocial    No documentation.                Abuse/Neglect    No documentation.                Legal    No documentation.                Substance Abuse    No documentation.                Patient Forms    No documentation.                   Evelia Boudreaux

## 2023-05-12 NOTE — PROGRESS NOTES
Enter Query Response Below      Query Response:Parainfluenza pneumonia             If applicable, please update the problem list.     Patient: Db Hardin        : 1950  Account: [id number]           Admit Date: 2023        How to Respond to this query:       a. Click New Note     b. Answer query within the yellow box.                c. Update the Problem List, if applicable.      If you have any questions about this query contact me at: [email or web address]         72 year old man with COPD, chronic respiratory failure, and DM2, admitted  with parainfluenza virus infection, COPD exacerbation, and acute on chronic respiratory failure.  Cardiology notes  and  state a diagnosis of Parainfluenza pneumonia. Pulmonology notes  -  state a diagnosis of acute parainfluenza bronchitis. DC Summary notes only parainfluenza.  Treatment included:  IV methylprednisone, po Prednisone, DuoNebs,  po Mucinex, as needed Hycodan for cough, Pulmicort, and Brovana.     Please document if the Parainfluenza infection was determined to be:  - Parainfluenza pneumonia  - Acute parainfluenza bronchitis  - Both Parainfluenza pneumonia and bronchitis  - Other, please specify  - Unable to determine     By submitting this query, we are merely seeking further clarification of documentation to accurately reflect all conditions that you are monitoring, evaluating, treating or that extend the hospitalization or utilize additional resources of care. Please utilize your independent clinical judgment when addressing the question(s) above.     This query and your response, once completed, will be entered into the legal medical record.    Sincerely,   Dilcia Bowers RN, BSN  [email or web address]  Clinical Documentation Integrity Program

## 2023-05-15 ENCOUNTER — READMISSION MANAGEMENT (OUTPATIENT)
Dept: CALL CENTER | Facility: HOSPITAL | Age: 73
End: 2023-05-15
Payer: MEDICARE

## 2023-05-15 NOTE — OUTREACH NOTE
COPD/PN Week 1 Survey    Flowsheet Row Responses   [facility] patient discharged from? [place]   Does the patient have one of the following disease processes/diagnoses(primary or secondary)? COPD   Week 1 attempt successful? Yes   Call start time 1248   Call end time 1257   Discharge diagnosis  COPD exacerbation   Meds reviewed with patient/caregiver? Yes   Is the patient having any side effects they believe may be caused by any medication additions or changes? No   Does the patient have all medications ordered at discharge? Yes   Is the patient taking all medications as directed (includes completed medication regime)? Yes   Does the patient have a primary care provider?  Yes   Does the patient have an appointment with their PCP or specialist within 7 days of discharge? Yes   Has the patient kept scheduled appointments due by today? N/A   Has home health visited the patient within 72 hours of discharge? N/A   Has all DME been delivered? Yes   Pulse Ox monitoring Intermittent   Pulse Ox device source Patient   O2 Sat comments 95%   O2 Sat: education provided Sat levels, Monitoring frequency, When to seek care   O2 Sat education comments If 90% or below and stays there, call 911.   Psychosocial issues? Yes   Did the patient receive a copy of their discharge instructions? Yes   Nursing interventions Reviewed instructions with patient   What is the patient's perception of their health status since discharge? Improving   Nursing Interventions Nurse provided patient education   Are the patient's immunizations up to date?  Yes   Nursing interventions Educated on importance of maintaining up to date immunizations as advised by provider   If the patient is a current smoker, are they able to teach back resources for cessation? Not a smoker   Additional teach back comments Discussed food consumption.   Is the patient able to teach back COPD zones? Yes   Nursing interventions Education provided on various zones    Patient reports what zone on this call? Yellow Zone   Yellow Zone Reports having a bad day or a COPD flare, I have less energy for my daily activities, Poor sleep and my symptoms woke me up, Increased or thicker phlegm/mucus   Yellow interventions Continue taking daily medications, Use quick relief inhaler, Use oxygen as prescribed, Start antibiotic if ordered   Quick Relief Inhaler Usage mucous pills are helping   Week 1 call completed? Yes   Is the patient interested in additional calls from an ambulatory ?  NOTE:  applies to high risk patients requiring additional follow-up. No   Wrap up additional comments No questions at this time.          Nerissa NGO - Registered Nurse

## 2023-05-23 ENCOUNTER — READMISSION MANAGEMENT (OUTPATIENT)
Dept: CALL CENTER | Facility: HOSPITAL | Age: 73
End: 2023-05-23
Payer: MEDICARE

## 2023-05-23 NOTE — OUTREACH NOTE
COPD/PN Week 2 Survey    Flowsheet Row Responses   [facility] patient discharged from? [place]   Does the patient have one of the following disease processes/diagnoses(primary or secondary)? COPD   Week 2 attempt successful? Yes   Call start time 1651   Revoke Readmitted  [[facility]]   Discharge diagnosis  COPD exacerbation   Is patient permission given to speak with other caregiver? Yes   Person spoke with today (if not patient) and relationship Horace NGO - Registered Nurse

## 2023-08-11 ENCOUNTER — OFFICE VISIT (OUTPATIENT)
Dept: CARDIOLOGY | Facility: CLINIC | Age: 73
End: 2023-08-11
Payer: MEDICARE

## 2023-08-11 VITALS
SYSTOLIC BLOOD PRESSURE: 136 MMHG | BODY MASS INDEX: 23.54 KG/M2 | HEART RATE: 57 BPM | HEIGHT: 67 IN | WEIGHT: 150 LBS | DIASTOLIC BLOOD PRESSURE: 62 MMHG

## 2023-08-11 DIAGNOSIS — J44.9 CHRONIC OBSTRUCTIVE PULMONARY DISEASE, UNSPECIFIED COPD TYPE: Primary | ICD-10-CM

## 2023-08-11 DIAGNOSIS — I10 PRIMARY HYPERTENSION: ICD-10-CM

## 2023-08-11 DIAGNOSIS — Z95.5 S/P CORONARY ARTERY STENT PLACEMENT: ICD-10-CM

## 2023-08-11 PROCEDURE — 93000 ELECTROCARDIOGRAM COMPLETE: CPT | Performed by: INTERNAL MEDICINE

## 2023-08-11 PROCEDURE — 3075F SYST BP GE 130 - 139MM HG: CPT | Performed by: INTERNAL MEDICINE

## 2023-08-11 PROCEDURE — 99214 OFFICE O/P EST MOD 30 MIN: CPT | Performed by: INTERNAL MEDICINE

## 2023-08-11 PROCEDURE — 3078F DIAST BP <80 MM HG: CPT | Performed by: INTERNAL MEDICINE

## 2023-08-11 RX ORDER — FUROSEMIDE 20 MG/1
20 TABLET ORAL DAILY
Qty: 30 TABLET | Refills: 11 | COMMUNITY
Start: 2023-07-14 | End: 2024-07-13

## 2023-08-11 RX ORDER — BUDESONIDE, GLYCOPYRROLATE, AND FORMOTEROL FUMARATE 160; 9; 4.8 UG/1; UG/1; UG/1
2 AEROSOL, METERED RESPIRATORY (INHALATION) 2 TIMES DAILY
COMMUNITY
Start: 2023-04-13

## 2023-08-11 NOTE — PROGRESS NOTES
Date of Office Visit: 19  Encounter Provider: Sidney Resendiz MD  Place of Service: [facility] CARDIOLOGY  Patient Name: Db Hardin  :[id number]  [id number]    Chief Complaint   Patient presents with    Coronary Artery Disease    Follow-up     1 year   :     HPI: Db Hardin is a 72 y.o. male he is here for follow-up of his coronary disease.  He is a gentleman that had presented with a non-ST elevation MI in  and at that time ended up having 3 bare-metal stents implanted into all 3 of his coronary arteries.  Is a large diameter stents.  He has had normal LV function.  He stopped smoking at that point but had smoked a lot.  He did have chest pain with that non-STEMI.    He has not had any chest discomfort his main complaint is shortness of breath.  He has difficulty cutting his grass he does not have PND orthopnea edema is not any change in his weight no syncope or palpitations.  He had recent testing and was seen by his pulmonologist who describes this emphysema is very severe.  He had a CT of his chest which showed severe emphysema and also incidentally noted to have a small AAA that was less than 3 cm in diameter and his abdominal aorta.    He had a bit of a tough May 2023.  He was hospitalized at [facility] did not really feel like the care was very good for him there ended up leaving going to [facility] had a CT scan done which showed bilateral pulmonary emboli then he ended up being admitted to [place] they did another CT and they did not see any pulmonary emboli so it is a little confusing he is on Eliquis he is got a lot of lung disease bad COPD and sounds like but the scarring in his lungs he is going to see the pulmonologist again in September there is some concern he might have cancer.  His cardiac status has been good he has not had chest discomfort no PND orthopnea no edema    Past Medical History:   Diagnosis Date    CAD (coronary artery disease)     COPD  (chronic obstructive pulmonary disease)     Diabetes     Disease of thyroid gland     Elevated cholesterol     Hyperlipidemia     Hypertension     Myocardial infarction 2011    tx with PCI    Sinus bradycardia     ST elevation myocardial infarction involving left anterior descending (LAD) coronary artery 2016 tx with PCI       Past Surgical History:   Procedure Laterality Date    CARDIAC CATHETERIZATION      2011:3.0x15mm Vision pLAD; 3.5x20mm Vision p circ; 3.5x28mm Vision to mRCA    CARPAL TUNNEL RELEASE Left     CORONARY ANGIOPLASTY      CORONARY STENT PLACEMENT      2011:3.0x15mm Vision pLAD; 3.5x20mm Vision p circ; 3.5x28mm Vision to mRCA    JOINT REPLACEMENT         Social History     Socioeconomic History    Marital status:    Tobacco Use    Smoking status: Former     Packs/day: 2.00     Years: 30.00     Pack years: 60.00     Types: Cigarettes     Quit date: 2011     Years since quittin.1    Smokeless tobacco: Never    Tobacco comments:     caffeine use - coffee on occas.    Vaping Use    Vaping Use: Never used   Substance and Sexual Activity    Alcohol use: Yes     Alcohol/week: 1.0 standard drink     Types: 1 Cans of beer per week     Comment: beer --occ    Drug use: No    Sexual activity: Defer       Family History   Problem Relation Age of Onset    No Known Problems Mother     Cancer Father         lung cancer (smoker)     No Known Problems Sister     No Known Problems Brother     No Known Problems Maternal Grandmother     No Known Problems Maternal Grandfather     No Known Problems Paternal Grandmother     No Known Problems Paternal Grandfather     No Known Problems Brother     No Known Problems Sister     Brain cancer Brother     Heart attack Brother        Review of Systems   Constitutional: Negative for decreased appetite, fever, malaise/fatigue and weight loss.   HENT:  Negative for nosebleeds.    Eyes:  Negative for double vision.   Cardiovascular:  Negative for chest  pain, claudication, cyanosis, dyspnea on exertion, irregular heartbeat, leg swelling, near-syncope, orthopnea, palpitations, paroxysmal nocturnal dyspnea and syncope.   Respiratory:  Negative for cough, hemoptysis and shortness of breath.    Hematologic/Lymphatic: Negative for bleeding problem.   Skin:  Negative for rash.   Musculoskeletal:  Negative for falls and myalgias.   Gastrointestinal:  Negative for hematochezia, jaundice, melena, nausea and vomiting.   Genitourinary:  Negative for hematuria.   Neurological:  Negative for dizziness and seizures.   Psychiatric/Behavioral:  Negative for altered mental status and memory loss.      No Known Allergies      Current Outpatient Medications:     albuterol (PROVENTIL HFA;VENTOLIN HFA) 108 (90 BASE) MCG/ACT inhaler, Inhale 2 puffs Every 4 (Four) Hours As Needed for wheezing., Disp: 6.7 g, Rfl: 11    apixaban (ELIQUIS) 5 MG tablet tablet, Take 1 tablet by mouth Every 12 (Twelve) Hours., Disp: , Rfl:     aspirin 81 MG tablet, Take 1 tablet by mouth Daily., Disp: , Rfl:     Breztri Aerosphere 160-9-4.8 MCG/ACT aerosol inhaler, Inhale 2 puffs 2 (Two) Times a Day., Disp: , Rfl:     furosemide (LASIX) 20 MG tablet, Take 1 tablet by mouth Daily., Disp: 30 tablet, Rfl: 11    levothyroxine (SYNTHROID, LEVOTHROID) 75 MCG tablet, Take 1 tablet by mouth Daily., Disp: , Rfl:     meclizine (ANTIVERT) 25 MG tablet, Take 1 tablet by mouth As Needed for Dizziness., Disp: , Rfl:     pravastatin (PRAVACHOL) 40 MG tablet, Take 1 tablet by mouth Daily., Disp: , Rfl:     tamsulosin (FLOMAX) 0.4 MG capsule 24 hr capsule, Take 1 capsule by mouth Daily., Disp: , Rfl:     traZODone (DESYREL) 50 MG tablet, Take 1 tablet by mouth Every Night., Disp: , Rfl:     ipratropium-albuterol (DUO-NEB) 0.5-2.5 mg/3 ml nebulizer, Take 3 mL by nebulization Every 4 (Four) Hours As Needed for Wheezing. (Patient not taking: Reported on 8/11/2023), Disp: , Rfl:      Objective:     Vitals:    08/11/23 1143   BP:  "136/62   Pulse: 57   Weight: 68 kg (150 lb)   Height: 168.9 cm (66.5\")     Body mass index is 23.85 kg/mý.    Physical Exam  Constitutional:       Appearance: He is well-developed.   HENT:      Head: Normocephalic.   Eyes:      General: No scleral icterus.  Neck:      Thyroid: No thyromegaly.      Vascular: No JVD.   Cardiovascular:      Rate and Rhythm: Normal rate and regular rhythm.      Heart sounds: Normal heart sounds. No murmur heard.    No friction rub. No gallop.   Pulmonary:      Effort: Pulmonary effort is normal.      Breath sounds: Normal breath sounds. No wheezing or rales.   Abdominal:      Palpations: Abdomen is soft.      Tenderness: There is no abdominal tenderness.   Musculoskeletal:         General: Normal range of motion.   Lymphadenopathy:      Cervical: No cervical adenopathy.   Skin:     General: Skin is warm and dry.      Findings: No rash.   Neurological:      Mental Status: He is alert and oriented to person, place, and time.         ECG 12 Lead    Date/Time: 8/11/2023 1:01 PM  Performed by: Sidney Resendiz MD  Authorized by: Sidney Resendiz MD   Rhythm: sinus rhythm    Clinical impression: normal ECG         Assessment:       Diagnosis Plan   1. Chronic obstructive pulmonary disease, unspecified COPD type        2. S/P coronary artery stent placement        3. Primary hypertension                 Plan:       I think from a cardiac standpoint he is doing well I think I do not see anything that indicates that his shortness of breath is cardiac disease the stents we put in her now 10 years old there are bare-metal I do not think they are giving us a problem he stopped smoking his risk modification is actually been very good since then he has severe COPD and I think that the main issue.    His lung disease is really starting to dominated his life unfortunately it is a little confusing from what happened in May because he had 1 CT that showed no PEs and 1 that showed PEs but he is under the " care of the pulmonologist I am not changing anything because I think from a heart standpoint were stable I will have him come back and see us in a year    As always, it has been a pleasure to participate in your patient's care.      Sincerely,       Sidney Resendiz MD

## 2024-05-09 NOTE — PAYOR COMM NOTE
[facility]     &  [facility] [address]     [address] [place] [address]     WILIAN Orozco [id number]  NPI: [id number]     NPI: [id number]  Tax ID: [id number]     Tax ID: [id number]    Gladys Dominguez - [phone]  Utilization Review/Room Reservations  Phone: [phone], [phone], [phone], Marichuy [phone], Cabrera [phone] or [phone]  Fax: [phone]  Email: [email or web address]  Please call, fax back, or email with authorization or any questions! Thanks!    INPT CLINICAL  AUTH#[id number]           This fax contains any of the following:  Face Sheet, H&P, progress notes, consults, orders, meds, lab results, labor record, vitals, delivery worksheet, op note, d/c summary.  The information contained in this fax is confidential for the use of the Individual or entity named above. If the reader of this message is not the Intended recipient (or the employee or agent responsible to deliver it to the Intended recipient), you are hereby notified that any dissemination, distribution, or copy of this communication is prohibited. If you have received this communication in error, please notify us by collect telephone call and return the original message to us at the above address at our expense.  Db Hardin (72 y.o. Male)     Date of Birth   1950    Social Security Number       Address   [address]  [facility] [address]    Home Phone   [phone]    MRN   [id number]       Islam   [facility]    Marital Status                               Admission Date   5/5/23    Admission Type   Emergency    Admitting Provider   Lasha Iqbal DO    Attending Provider   Valery Pond MD    Department, Room/Bed   [address], [address]       Discharge Date       Discharge Disposition       Discharge Destination                               Attending Provider: Valery Pond MD   "  Allergies: No Known Allergies    Isolation: None   Infection: COVID (rule out) (05/05/23)   Code Status: CPR    Ht: 168.9 cm (66.5\")   Wt: 69.4 kg (152 lb 14.4 oz)    Admission Cmt: None   Principal Problem: Acute hypoxemic respiratory failure [J96.01]                 Active Insurance as of 5/5/2023     Primary Coverage     Payor Plan Insurance Group Employer/Plan Group    ANTHEM MEDICARE REPLACEMENT ANTHEM MEDICARE ADVANTAGE [id number]     Payor Plan Address Payor Plan Phone Number Payor Plan Fax Number Effective Dates    [address] [phone]  1/1/2022 - None Entered    [facility] [address]       Subscriber Name Subscriber Birth Date Member ID       STEPAN NUNES E 1950 [id number]           Secondary Coverage     Payor Plan Insurance Group Employer/Plan Group    [place]      Payor Plan Address Payor Plan Phone Number Payor Plan Fax Number Effective Dates    [address] [phone]  2/10/2017 - None Entered    [facility] [id number]       Subscriber Name Subscriber Birth Date Member ID       STEPAN NUNES E 1950 [id number]                 Emergency Contacts      (Rel.) Home Phone Work Phone Mobile Phone    Carol Nunes (Spouse) [phone] -- [phone]    Khadar Cole (IN LAW) (Son) [phone] -- [phone]    DouglasVicky (Daughter) [phone] -- [phone]    DESLETY (Son) [phone] -- --    STEVIE NUNES (DAUGHTER IN LAW) (Relative) [phone] -- --               History & Physical      Vicenta Watkins APRN at 05/05/23 0348     Attestation signed by Lasha Iqbal DO at 05/06/23 0320      Brief Attending Admission Attestation   I have seen and examined the patient, performing an independent face-to-face diagnostic evaluation with plan of care reviewed and developed with Vicenta Watkins APRN.  The majority of medical decision making (>50%) for this encounter was completed by myself and discussed with this APRN.  Patient seen " and examined before midnight, note delayed due to patient care.    Brief Summary Statement/HPI  Patient is a 72-year-old male with history of COPD, chronic respiratory failure with hypoxia (on 2 L home O2), type 2 diabetes mellitus, CAD, hypertension who presents to [facility] with complaints of worsening dyspnea, cough, congestion over the past 4 to 5 days prior to arrival.  Patient states that he initially noticed the symptoms this past Monday, subsequently was prescribed azithromycin and corticosteroids, which he has been taking since that time, however, has had no improvement in symptoms.  Denies recollection of known inciting factors. Endorses associated fever, fatigue. In the ED, patient found to have a documented O2 sat of 87% on room air, also, requiring increased oxygen when compared to that of his normal home requirements, he is being admitted for further evaluation and management.  Remainder of detailed HPI is as noted above and has been reviewed by me for completeness.    Attending Physical Exam  Temp:  [98.2 °F (36.8 °C)-101.5 °F (38.6 °C)] 98.2 °F (36.8 °C)  Heart Rate:  [] 59  Resp:  [16-28] 16  BP: (110-133)/(66-98) 117/67  Const: Ill-appearing, uncomfortable secondary to respiratory symptoms  Eyes: normal conjunctiva, no scleral icterus  HENT: atraumatic, non-tender  CV: Regular rate, regular rhythm  RESP: On supplemental oxygen, scattered wheezes, decreased breath sounds  GI: soft, non-tender, non-distended  MSK: No gross deformities appreciated, no tenderness  Skin: Warm, dry. No rashes  Neuro: Awake, alert, oriented x3, no appreciable focal neurological deficits.  Psych: Appropriate mood and affect.    Results Review:  I reviewed the patient's new clinical results.  I reviewed the patient's new imaging results and agree with the interpretation.  I reviewed the patient's other test results and agree with the interpretation  I personally viewed and interpreted the patient's  EKG/Telemetry data    Assessment/Plan  Patient is a 72-year-old male with history of COPD, chronic respiratory failure with hypoxia (on 2 L home O2), type 2 diabetes mellitus, CAD, hypertension who presents to [facility] with complaints of worsening dyspnea, cough, congestion over the past 4 to 5 days prior to arrival.    Acute on chronic respiratory failure with hypoxia secondary to Parainfluenza virus 3 and COPD with acute exacerbation  - Meets criteria for diagnosis with: dyspnea, documented O2 sat 87% on room air, increased oxygen requirements.  - RVP + for parainfluenza virus, WBC slightly elevated but has taken steroids, procalcitonin negative.  - CXR negative for evidence of focal consolidation.  - Corticosteroids, scheduled and PRN bronchodilators, PRN medications for symptom control, telemetry, pulse oximetry.  - Order AM ABG.    CAD with elevated troponin  - Etiology of troponin elevation likely 2/2 acute illness, no signs or symptoms of ACS at present.  - Cardiology consulted. Will continue to follow their plans/recommendations. Greatly appreciate their help.  - Telemetry monitoring.    Hypertension  - BP acceptable acutely. Appears stable. No indications to warrant acute changes/intervention at this time.  - Continue current medications as prescribed. Trend BP to guide ongoing management decisions.    Hyperlipidemia  - Stable. Continue Statin as prescribed.    BPH  - Stable. Continue Tamsulosin as prescribed.    Active Hospital Problems    Diagnosis  POA   • **Acute hypoxemic respiratory failure [J96.01]  Yes   • Elevated troponin [R77.8]  Unknown   • COPD exacerbation [J44.1]  Yes   • Acute on chronic respiratory failure with hypoxia [J96.21]  Yes   • Parainfluenza [B34.8]  Unknown   • Type 2 diabetes mellitus [E11.9]  Yes   • S/P coronary artery stent placement [Z95.5]  Not Applicable   • Hypertension [I10]  Yes      Resolved Hospital Problems   No resolved problems to display.     See  above section for further detailed assessment and plan developed with APRN which I have reviewed.      Electronically signed by Lasha Iqbal DO                        Patient Name:  Db Hardin  YOB: 1950  MRN:  [id number]  Admit Date:  5/5/2023  Patient Care Team:  Lily Porter MD as PCP - General (Pediatrics)      Subjective    History Present Illness     Chief Complaint   Patient presents with   • Cough   • URI     History of Present Illness   Mr. Hardin is a 72-year-old male with history of COPD, coronary stent, HTN who presents to the emergency room with cough, fever, upper respiratory symptoms.  Patient states she had the symptoms since Monday history, he was seen in urgent care facility and prescribed azithromycin and Medrol which he has been taking with no improvement in symptoms.  He states he has had a fever at home.  He has also had a severe cough that has been nonproductive until today he started coughing up some gray sputum.  He denies any swelling in his legs.  He will be admitted to hospitalist he denies any chest pain exacerbation.  He is currently on 2 L.  At home due to his COPD.  He has been using his prescribed inhalers and nebulizer treatments with no significant improvement.  In the emergency room patient's troponin was 16 with a repeat of 23 does report noncompliance with her medications  and delta of 7, patient denies any chest pain, EKG showed sinus rhythm with no significant change from previous.  Blood glucose 111, sodium 138, creatinine 1.18, bun 34, wbc 12.07, hemoglobin 17.1, hematocrit 48.4, platelets 203.  Hypoglycemia treatment.  Patient did test positive for parainfluenza virus, chest x-ray was unremarkable for any acute disease.  Patient was given Solu-Medrol and DuoNeb in the emergency room.    Review of Systems   Constitutional: Positive for fever. Negative for appetite change.   HENT: Positive for sinus pressure. Negative for nosebleeds and trouble  swallowing.    Eyes: Negative for photophobia, redness and visual disturbance.   Respiratory: Positive for cough, shortness of breath and wheezing. Negative for chest tightness.    Cardiovascular: Negative for chest pain, palpitations and leg swelling.   Gastrointestinal: Negative for abdominal distention, abdominal pain, nausea and vomiting.   Endocrine: Negative.    Genitourinary: Negative.    Musculoskeletal: Negative for gait problem and joint swelling.   Skin: Negative.    Neurological: Negative for dizziness, seizures, speech difficulty, light-headedness and headaches.   Hematological: Negative.    Psychiatric/Behavioral: Negative for behavioral problems and confusion.        Personal History     Past Medical History:   Diagnosis Date   • CAD (coronary artery disease)    • COPD (chronic obstructive pulmonary disease)    • Diabetes    • Disease of thyroid gland    • Elevated cholesterol    • Hyperlipidemia    • Hypertension    • Myocardial infarction 03/2011    tx with PCI   • Sinus bradycardia    • ST elevation myocardial infarction involving left anterior descending (LAD) coronary artery 5/6/2016 2011 tx with PCI     Past Surgical History:   Procedure Laterality Date   • CARDIAC CATHETERIZATION      2011:3.0x15mm Vision pLAD; 3.5x20mm Vision p circ; 3.5x28mm Vision to mRCA   • CARPAL TUNNEL RELEASE Left    • CORONARY ANGIOPLASTY     • CORONARY STENT PLACEMENT      2011:3.0x15mm Vision pLAD; 3.5x20mm Vision p circ; 3.5x28mm Vision to mRCA   • JOINT REPLACEMENT       Family History   Problem Relation Age of Onset   • No Known Problems Mother    • Cancer Father         lung cancer (smoker)    • No Known Problems Sister    • No Known Problems Brother    • No Known Problems Maternal Grandmother    • No Known Problems Maternal Grandfather    • No Known Problems Paternal Grandmother    • No Known Problems Paternal Grandfather    • No Known Problems Brother    • No Known Problems Sister    • Brain cancer Brother     • Heart attack Brother      Social History     Tobacco Use   • Smoking status: Former     Packs/day: 2.00     Years: 30.00     Pack years: 60.00     Types: Cigarettes     Quit date: 2011     Years since quittin.8   • Smokeless tobacco: Never   • Tobacco comments:     caffeine use - coffee on occas.    Substance Use Topics   • Alcohol use: Yes     Alcohol/week: 1.0 standard drink     Types: 1 Cans of beer per week     Comment: beer --occ   • Drug use: No     No current facility-administered medications on file prior to encounter.     Current Outpatient Medications on File Prior to Encounter   Medication Sig Dispense Refill   • albuterol (PROVENTIL HFA;VENTOLIN HFA) 108 (90 BASE) MCG/ACT inhaler Inhale 2 puffs Every 4 (Four) Hours As Needed for wheezing. 6.7 g 11   • aspirin 81 MG tablet Take 1 tablet by mouth Daily.     • budesonide (PULMICORT) 0.5 MG/2ML nebulizer solution Inhale 2 mL Daily.     • formoterol (PERFOROMIST) 20 MCG/2ML nebulizer solution Take  by nebulization 2 (Two) Times a Day.     • ipratropium-albuterol (DUO-NEB) 0.5-2.5 mg/3 ml nebulizer Take 3 mL by nebulization Every 4 (Four) Hours As Needed for Wheezing.     • levothyroxine (SYNTHROID, LEVOTHROID) 88 MCG tablet Take 75 mcg by mouth Daily.     • pravastatin (PRAVACHOL) 40 MG tablet Take 1 tablet by mouth Daily.     • Budeson-Glycopyrrol-Formoterol (BREZTRI AEROSPHERE IN) Inhale.     • meclizine (ANTIVERT) 25 MG tablet Take 25 mg by mouth As Needed for Dizziness.     • montelukast (SINGULAIR) 10 MG tablet Take 1 tablet by mouth Every Night.     • tamsulosin (FLOMAX) 0.4 MG capsule 24 hr capsule Take 1 capsule by mouth Daily.     • traZODone (DESYREL) 50 MG tablet Take 1 tablet by mouth Every Night.     • [DISCONTINUED] triamcinolone (KENALOG) 0.025 % cream Apply  topically to the appropriate area as directed Daily.     • [DISCONTINUED] umeclidinium-vilanterol (ANORO ELLIPTA) 62.5-25 MCG/INH aerosol powder  inhaler Inhale 1 puff Daily.        No Known Allergies    Objective     Objective     Vital Signs  Temp:  [98.2 °F (36.8 °C)-101.5 °F (38.6 °C)] 98.2 °F (36.8 °C)  Heart Rate:  [] 69  Resp:  [16-28] 16  BP: (110-133)/(66-98) 117/67  SpO2:  [87 %-93 %] 90 %  on  Flow (L/min):  [2-4] 2;   Device (Oxygen Therapy): nasal cannula  Body mass index is 25.12 kg/m².    Physical Exam  Vitals and nursing note reviewed.   Constitutional:       General: He is not in acute distress.     Appearance: He is well-developed.   HENT:      Head: Normocephalic.   Neck:      Vascular: No JVD.   Cardiovascular:      Rate and Rhythm: Normal rate and regular rhythm.      Heart sounds: Normal heart sounds.      Comments: NSR on the monitor, no peripheral edema    Pulmonary:      Effort: Pulmonary effort is normal.      Breath sounds: Examination of the right-upper field reveals wheezing. Examination of the left-upper field reveals wheezing. Examination of the right-lower field reveals decreased breath sounds. Examination of the left-lower field reveals decreased breath sounds. Decreased breath sounds and wheezing present.      Comments: 1L of oxygen with sats 94% during my exam  Abdominal:      General: Bowel sounds are normal. There is no distension.      Palpations: Abdomen is soft.      Tenderness: There is no abdominal tenderness.   Musculoskeletal:         General: Normal range of motion.      Cervical back: Normal range of motion.   Skin:     General: Skin is warm and dry.      Capillary Refill: Capillary refill takes less than 2 seconds.   Neurological:      Mental Status: He is alert and oriented to person, place, and time.   Psychiatric:         Mood and Affect: Mood normal.         Speech: Speech normal.         Behavior: Behavior normal.         Cognition and Memory: Cognition normal.         Results Review:  I reviewed the patient's new clinical results.  I reviewed the patient's new imaging results and agree with the interpretation.  I reviewed the  patient's other test results and agree with the interpretation  I personally viewed and interpreted the patient's EKG/Telemetry data  Discussed with ED provider.    Lab Results (last 24 hours)     Procedure Component Value Units Date/Time    CBC & Differential [[id number]]  (Abnormal) Collected: 05/05/23 1817    Specimen: Blood Updated: 05/05/23 1836    Narrative:      The following orders were created for panel order CBC & Differential.  Procedure                               Abnormality         Status                     ---------                               -----------         ------                     CBC Auto Differential[[id number]]        Abnormal            Final result                 Please view results for these tests on the individual orders.    Comprehensive Metabolic Panel [[id number]]  (Abnormal) Collected: 05/05/23 1817    Specimen: Blood Updated: 05/05/23 1855     Glucose 111 mg/dL      BUN 34 mg/dL      Creatinine 1.18 mg/dL      Sodium 138 mmol/L      Potassium 4.2 mmol/L      Chloride 99 mmol/L      CO2 30.0 mmol/L      Calcium 9.0 mg/dL      Total Protein 7.1 g/dL      Albumin 4.7 g/dL      ALT (SGPT) 21 U/L      AST (SGOT) 23 U/L      Alkaline Phosphatase 104 U/L      Total Bilirubin 1.3 mg/dL      Globulin 2.4 gm/dL      A/G Ratio 2.0 g/dL      BUN/Creatinine Ratio 28.8     Anion Gap 9.0 mmol/L      eGFR 64.8 mL/min/1.73     Narrative:      GFR Normal >60  Chronic Kidney Disease <60  Kidney Failure <15    The GFR formula is only valid for adults with stable renal function between ages 18 and 70.    Protime-INR [[id number]]  (Normal) Collected: 05/05/23 1817    Specimen: Blood Updated: 05/05/23 1850     Protime 13.3 Seconds      INR 1.00    aPTT [[id number]]  (Normal) Collected: 05/05/23 1817    Specimen: Blood Updated: 05/05/23 1850     PTT 24.1 seconds     High Sensitivity Troponin T [[id number]]  (Abnormal) Collected: 05/05/23 1817    Specimen: Blood Updated: 05/05/23 1855     HS Troponin T 16  ng/L     Narrative:      High Sensitive Troponin T Reference Range:  <10.0 ng/L- Negative Female for AMI  <15.0 ng/L- Negative Male for AMI  >=10 - Abnormal Female indicating possible myocardial injury.  >=15 - Abnormal Male indicating possible myocardial injury.   Clinicians would have to utilize clinical acumen, EKG, Troponin, and serial changes to determine if it is an Acute Myocardial Infarction or myocardial injury due to an underlying chronic condition.         Lactic Acid, Plasma [[id number]]  (Normal) Collected: 05/05/23 1817    Specimen: Blood Updated: 05/05/23 1852     Lactate 1.9 mmol/L     CBC Auto Differential [[id number]]  (Abnormal) Collected: 05/05/23 1817    Specimen: Blood Updated: 05/05/23 1836     WBC 12.07 10*3/mm3      RBC 5.09 10*6/mm3      Hemoglobin 17.1 g/dL      Hematocrit 48.4 %      MCV 95.1 fL      MCH 33.6 pg      MCHC 35.3 g/dL      RDW 13.4 %      RDW-SD 46.4 fl      MPV 9.5 fL      Platelets 203 10*3/mm3      Neutrophil % 79.0 %      Lymphocyte % 11.8 %      Monocyte % 8.7 %      Eosinophil % 0.1 %      Basophil % 0.2 %      Immature Grans % 0.2 %      Neutrophils, Absolute 9.53 10*3/mm3      Lymphocytes, Absolute 1.43 10*3/mm3      Monocytes, Absolute 1.05 10*3/mm3      Eosinophils, Absolute 0.01 10*3/mm3      Basophils, Absolute 0.02 10*3/mm3      Immature Grans, Absolute 0.03 10*3/mm3      nRBC 0.0 /100 WBC     Procalcitonin [[id number]]  (Normal) Collected: 05/05/23 1817    Specimen: Blood Updated: 05/05/23 1940     Procalcitonin 0.09 ng/mL     Narrative:      As a Marker for Sepsis (Non-Neonates):    1. <0.5 ng/mL represents a low risk of severe sepsis and/or septic shock.  2. >2 ng/mL represents a high risk of severe sepsis and/or septic shock.    As a Marker for Lower Respiratory Tract Infections that require antibiotic therapy:    PCT on Admission    Antibiotic Therapy       6-12 Hrs later    >0.5                Strongly Recommended  >0.25 - <0.5        Recommended   0.1 - 0.25  "         Discouraged              Remeasure/reassess PCT  <0.1                Strongly Discouraged     Remeasure/reassess PCT    As 28 day mortality risk marker: \"Change in Procalcitonin Result\" (>80% or <=80%) if Day 0 (or Day 1) and Day 4 values are available. Refer to http://[email or web address]-pct-[email or web address]    Change in PCT <=80%  A decrease of PCT levels below or equal to 80% defines a positive change in PCT test result representing a higher risk for 28-day all-cause mortality of patients diagnosed with severe sepsis for septic shock.    Change in PCT >80%  A decrease of PCT levels of more than 80% defines a negative change in PCT result representing a lower risk for 28-day all-cause mortality of patients diagnosed with severe sepsis or septic shock.       Respiratory Panel PCR w/COVID-19(SARS-CoV-2) SUSU/LISA/MIGUEL ÁNGEL/PAD/COR/MAD/HUY In-House, NP Swab in UTM/VTM, 3-4 HR TAT - Swab, Nasopharynx [[id number]]  (Abnormal) Collected: 05/05/23 1819    Specimen: Swab from Nasopharynx Updated: 05/05/23 2006     ADENOVIRUS, PCR Not Detected     Coronavirus 229E Not Detected     Coronavirus HKU1 Not Detected     Coronavirus NL63 Not Detected     Coronavirus OC43 Not Detected     COVID19 Not Detected     Human Metapneumovirus Not Detected     Human Rhinovirus/Enterovirus Not Detected     Influenza A PCR Not Detected     Influenza B PCR Not Detected     Parainfluenza Virus 1 Not Detected     Parainfluenza Virus 2 Not Detected     Parainfluenza Virus 3 Detected     Parainfluenza Virus 4 Not Detected     RSV, PCR Not Detected     Bordetella pertussis pcr Not Detected     Bordetella parapertussis PCR Not Detected     Chlamydophila pneumoniae PCR Not Detected     Mycoplasma pneumo by PCR Not Detected    Narrative:      In the setting of a positive respiratory panel with a viral infection PLUS a negative procalcitonin without other underlying concern for bacterial infection, consider observing off antibiotics or discontinuation of " antibiotics and continue supportive care. If the respiratory panel is positive for atypical bacterial infection (Bordetella pertussis, Chlamydophila pneumoniae, or Mycoplasma pneumoniae), consider antibiotic de-escalation to target atypical bacterial infection.    Urinalysis With Culture If Indicated - Urine, Clean Catch [[id number]]  (Abnormal) Collected: 05/05/23 1819    Specimen: Urine, Clean Catch Updated: 05/05/23 1845     Color, UA Dark Yellow     Appearance, UA Clear     pH, UA 5.5     Specific Gravity, UA >=1.030     Glucose, UA Negative     Ketones, UA Trace     Bilirubin, UA Negative     Blood, UA Negative     Protein, UA 30 mg/dL (1+)     Leuk Esterase, UA Trace     Nitrite, UA Negative     Urobilinogen, UA 1.0 E.U./dL    Narrative:      In absence of clinical symptoms, the presence of pyuria, bacteria, and/or nitrites on the urinalysis result does not correlate with infection.    Urinalysis, Microscopic Only - Urine, Clean Catch [[id number]] Collected: 05/05/23 1819    Specimen: Urine, Clean Catch Updated: 05/05/23 1845     RBC, UA 0-2 /HPF      WBC, UA 0-2 /HPF      Comment: Urine culture not indicated.        Bacteria, UA None Seen /HPF      Squamous Epithelial Cells, UA 0-2 /HPF      Hyaline Casts, UA 7-12 /LPF      Methodology Automated Microscopy    Blood Culture - Blood, Arm, Right [[id number]] Collected: 05/05/23 1820    Specimen: Blood from Arm, Right Updated: 05/05/23 1830    Blood Culture - Blood, Arm, Left [[id number]] Collected: 05/05/23 1905    Specimen: Blood from Arm, Left Updated: 05/05/23 1910    High Sensitivity Troponin T 2Hr [[id number]]  (Abnormal) Collected: 05/05/23 2052    Specimen: Blood Updated: 05/05/23 2123     HS Troponin T 23 ng/L      Troponin T Delta 7 ng/L     Narrative:      High Sensitive Troponin T Reference Range:  <10.0 ng/L- Negative Female for AMI  <15.0 ng/L- Negative Male for AMI  >=10 - Abnormal Female indicating possible myocardial injury.  >=15 - Abnormal Male  indicating possible myocardial injury.   Clinicians would have to utilize clinical acumen, EKG, Troponin, and serial changes to determine if it is an Acute Myocardial Infarction or myocardial injury due to an underlying chronic condition.               Imaging Results (Last 24 Hours)     Procedure Component Value Units Date/Time    XR Chest 1 View [[id number]] Collected: 05/05/23 1904     Updated: 05/05/23 1907    Narrative:      XR CHEST 1 VW-     HISTORY: Male who is 72 years-old,  cough     TECHNIQUE: Frontal views of the chest     COMPARISON: 8/27/2020     FINDINGS: Heart, mediastinum and pulmonary vasculature are unremarkable.  No focal pulmonary consolidation, pleural effusion, or pneumothorax. ON  old granulomatous disease is seen. No acute osseous process.       Impression:      No focal pulmonary consolidation. Follow-up as clinical  indications persist.     This report was finalized on 5/5/2023 7:04 PM by Dr. Andrei Dumont M.D.             Results for orders placed during the hospital encounter of 08/29/22    Adult Transthoracic Echo Complete W/ Cont if Necessary Per Protocol    Interpretation Summary  · Estimated right ventricular systolic pressure from tricuspid regurgitation is normal (<35 mmHg). Calculated right ventricular systolic pressure from tricuspid regurgitation is 22.8 mmHg.  · Calculated left ventricular EF = 58.9% Estimated left ventricular EF was in agreement with the calculated left ventricular EF. Left ventricular systolic function is normal.  · There is calcification of the aortic valve.  · Left ventricular diastolic function was normal.      ECG 12 Lead Dyspnea   Preliminary Result   HEART RATE= 86  bpm   RR Interval= 698  ms   KS Interval= 166  ms   P Horizontal Axis= -1  deg   P Front Axis= 43  deg   QRSD Interval= 83  ms   QT Interval= 325  ms   QRS Axis= 50  deg   T Wave Axis= 86  deg   - OTHERWISE NORMAL ECG -   Sinus rhythm   Low voltage, extremity leads   Abnormal R-wave  progression, late transition   Electronically Signed By:    Date and Time of Study: 2023-05-05 18:37:15          Assessment/Plan     Active Hospital Problems    Diagnosis  POA   • **Acute hypoxemic respiratory failure [J96.01]  Yes   • COPD exacerbation [J44.1]  Yes   • Acute on chronic respiratory failure with hypoxia [J96.21]  Yes   • Parainfluenza [B34.8]  Unknown   • Type 2 diabetes mellitus [E11.9]  Yes   • S/P coronary artery stent placement [Z95.5]  Not Applicable     Description: 2011:3.0x15mm Vision pLAD; 3.5x20mm Vision p circ; 3.5x28mm Vision to mRCA     • Hypertension [I10]  Yes     Mr. Hardin is a 72-year-old male with history of COPD, coronary stent, HTN who presents to the emergency room with cough, fever, upper respiratory symptoms    Acute hypoxic respiratory failure/COPD exacerbation/parainfluenza  -Continue oxygen to keep sats 88-92  -DuoNeb treatments every 6 hours  -Albuterol treatments as needed for wheezing  -Prednisone 40 mg daily starting tomorrow  -Hycodan cough  -Telemetry unit for monitoring.    Hypertension/elevated troponin/coronary artery stent  -Repeat troponin in a.m.  - telemetry monitoring  -Consult cardiology, patient sees Dr. Resendiz    Type 2 diabetes  -Patient currently not on any medication  -will check Hgb A1C    · I discussed the patient's findings and my recommendations with patient.    VTE Prophylaxis - SCDs.  Code Status - Full code.       MICHAEL Sierra  [facility]  05/05/23  23:39 EDT      Electronically signed by Lasha Iqbal DO at 05/06/23 0320          Emergency Department Notes      Sharon Franco, RN at 05/05/23 9653        Patient to ER via car from home for cough with SOA patient was recently dx with sinus infection and been taking medication  Patient reports not getting better  patient has COPD uses oxygen at night    Electronically signed by Sharon Franco, RN at 05/05/23 8695     Tyshawn Quintana MD at 05/05/23 3755            EMERGENCY DEPARTMENT ENCOUNTER    Room Number:  11/11  Date of encounter:  5/5/2023  PCP: Lily Porter MD  Historian: Patient and family who are at bedside    Patient was placed in face mask during triage process. Patient was wearing facemask when I entered the room and throughout our encounter. I wore full protective equipment throughout this patient encounter including a face mask, eye protection, and gloves. Hand hygiene was performed before donning protective equipment and again following doffing of PPE after leaving the room.    HPI:  Chief Complaint: Cough and fever with associated dyspnea  A complete HPI/ROS/PMH/PSH/SH/FH are unobtainable due to: N/A   Context: Db Hardin is a 72 y.o. male with a history of COPD and CAD who does have supplemental oxygen to use at night but does not generally require it during the day presents complaining of increasing shortness of breath with persistent cough and fevers increasing over the last 5 days not responsive to oral steroids and antibiotics provided by his PCP.  No nausea vomiting diarrhea black or bloody stools reported.  His abdomen has become sore with all the coughing but denies significant pain.  No reported hemoptysis.      MEDICAL HISTORY REVIEW  EMR reviewed:    Discharge summary 7/17/2020 by Dr. Canales reviewed: Patient admitted for vertigo and dehydration    PAST MEDICAL HISTORY  Active Ambulatory Problems     Diagnosis Date Noted   • S/P coronary artery stent placement 05/06/2016   • Diabetes mellitus without complication 05/06/2016   • Hypertension 05/06/2016   • DESI (acute kidney injury) 02/10/2017   • CAD (coronary artery disease) 02/10/2017   • Disorder of joint 11/24/2015   • Chronic obstructive pulmonary disease 06/02/2014   • Hyperlipidemia 06/02/2014   • Knee osteoarthritis 05/03/2016   • Chest pain 05/17/2017   • Syncope and collapse 06/02/2017   • Hyperthyroidism 06/04/2017   • Status post left knee replacement 10/16/2017   • Left knee  pain 2017   • Vertigo 2020   • Dehydration 2020   • Dyspnea on exertion 2020     Resolved Ambulatory Problems     Diagnosis Date Noted   • ST elevation myocardial infarction involving left anterior descending (LAD) coronary artery 2016   • Influenza A 02/10/2017   • Periapical abscess 2017   • Fever 2017     Past Medical History:   Diagnosis Date   • COPD (chronic obstructive pulmonary disease)    • Diabetes    • Disease of thyroid gland    • Elevated cholesterol    • Myocardial infarction 2011   • Sinus bradycardia          PAST SURGICAL HISTORY  Past Surgical History:   Procedure Laterality Date   • CARDIAC CATHETERIZATION      :3.0x15mm Vision pLAD; 3.5x20mm Vision p circ; 3.5x28mm Vision to mRCA   • CARPAL TUNNEL RELEASE Left    • CORONARY ANGIOPLASTY     • CORONARY STENT PLACEMENT      :3.0x15mm Vision pLAD; 3.5x20mm Vision p circ; 3.5x28mm Vision to mRCA   • JOINT REPLACEMENT           FAMILY HISTORY  Family History   Problem Relation Age of Onset   • No Known Problems Mother    • Cancer Father         lung cancer (smoker)    • No Known Problems Sister    • No Known Problems Brother    • No Known Problems Maternal Grandmother    • No Known Problems Maternal Grandfather    • No Known Problems Paternal Grandmother    • No Known Problems Paternal Grandfather    • No Known Problems Brother    • No Known Problems Sister    • Brain cancer Brother    • Heart attack Brother          SOCIAL HISTORY  Social History     Socioeconomic History   • Marital status:    Tobacco Use   • Smoking status: Former     Packs/day: 2.00     Years: 30.00     Pack years: 60.00     Types: Cigarettes     Quit date: 2011     Years since quittin.8   • Smokeless tobacco: Never   • Tobacco comments:     caffeine use - coffee on occas.    Substance and Sexual Activity   • Alcohol use: Yes     Alcohol/week: 1.0 standard drink     Types: 1 Cans of beer per week     Comment: beer  --occ   • Drug use: No   • Sexual activity: Defer         ALLERGIES  Patient has no known allergies.        REVIEW OF SYSTEMS  Review of Systems     All systems reviewed and negative except for those discussed in HPI.       PHYSICAL EXAM    I have reviewed the triage vital signs and nursing notes.    ED Triage Vitals [05/05/23 1744]   Temp Heart Rate Resp BP SpO2   (!) 101.5 °F (38.6 °C) 113 28 -- (!) 87 %      Temp src Heart Rate Source Patient Position BP Location FiO2 (%)   -- -- -- -- --       Physical Exam    Physical Exam   Constitutional: Mild acute tachypneic with frequent coughing.  Ill-appearing though not overtly toxic.  HENT:  Head: Normocephalic and atraumatic.   Oropharynx: Mucous membranes are moist.   Eyes: No scleral icterus. No conjunctival pallor.  Neck: Painless range of motion noted. Neck supple.   Cardiovascular: Normal rate, regular rhythm and intact distal pulses.  Pulmonary/Chest: Mild tachypnea.  Diminished throughout with expiratory wheezing heard throughout.    Abdominal: Soft. There is no tenderness. There is no rebound and no guarding.   Musculoskeletal: Moves all extremities equally. There is no pedal edema or calf tenderness.   Neurological: Alert.  Baseline strength and sensation noted.   Skin: Skin is pink, warm, and dry. No pallor.   Psychiatric: Mood and affect normal.   Nursing note and vitals reviewed.    LAB RESULTS  Recent Results (from the past 24 hour(s))   Comprehensive Metabolic Panel    Collection Time: 05/05/23  6:17 PM    Specimen: Blood   Result Value Ref Range    Glucose 111 (H) 65 - 99 mg/dL    BUN 34 (H) 8 - 23 mg/dL    Creatinine 1.18 0.76 - 1.27 mg/dL    Sodium 138 136 - 145 mmol/L    Potassium 4.2 3.5 - 5.2 mmol/L    Chloride 99 98 - 107 mmol/L    CO2 30.0 (H) 22.0 - 29.0 mmol/L    Calcium 9.0 8.6 - 10.5 mg/dL    Total Protein 7.1 6.0 - 8.5 g/dL    Albumin 4.7 3.5 - 5.2 g/dL    ALT (SGPT) 21 1 - 41 U/L    AST (SGOT) 23 1 - 40 U/L    Alkaline Phosphatase 104 39 -  117 U/L    Total Bilirubin 1.3 (H) 0.0 - 1.2 mg/dL    Globulin 2.4 gm/dL    A/G Ratio 2.0 g/dL    BUN/Creatinine Ratio 28.8 (H) 7.0 - 25.0    Anion Gap 9.0 5.0 - 15.0 mmol/L    eGFR 64.8 >60.0 mL/min/1.73   Protime-INR    Collection Time: 05/05/23  6:17 PM    Specimen: Blood   Result Value Ref Range    Protime 13.3 11.7 - 14.2 Seconds    INR 1.00 0.90 - 1.10   aPTT    Collection Time: 05/05/23  6:17 PM    Specimen: Blood   Result Value Ref Range    PTT 24.1 22.7 - 35.4 seconds   High Sensitivity Troponin T    Collection Time: 05/05/23  6:17 PM    Specimen: Blood   Result Value Ref Range    HS Troponin T 16 (H) <15 ng/L   Lactic Acid, Plasma    Collection Time: 05/05/23  6:17 PM    Specimen: Blood   Result Value Ref Range    Lactate 1.9 0.5 - 2.0 mmol/L   CBC Auto Differential    Collection Time: 05/05/23  6:17 PM    Specimen: Blood   Result Value Ref Range    WBC 12.07 (H) 3.40 - 10.80 10*3/mm3    RBC 5.09 4.14 - 5.80 10*6/mm3    Hemoglobin 17.1 13.0 - 17.7 g/dL    Hematocrit 48.4 37.5 - 51.0 %    MCV 95.1 79.0 - 97.0 fL    MCH 33.6 (H) 26.6 - 33.0 pg    MCHC 35.3 31.5 - 35.7 g/dL    RDW 13.4 12.3 - 15.4 %    RDW-SD 46.4 37.0 - 54.0 fl    MPV 9.5 6.0 - 12.0 fL    Platelets 203 140 - 450 10*3/mm3    Neutrophil % 79.0 (H) 42.7 - 76.0 %    Lymphocyte % 11.8 (L) 19.6 - 45.3 %    Monocyte % 8.7 5.0 - 12.0 %    Eosinophil % 0.1 (L) 0.3 - 6.2 %    Basophil % 0.2 0.0 - 1.5 %    Immature Grans % 0.2 0.0 - 0.5 %    Neutrophils, Absolute 9.53 (H) 1.70 - 7.00 10*3/mm3    Lymphocytes, Absolute 1.43 0.70 - 3.10 10*3/mm3    Monocytes, Absolute 1.05 (H) 0.10 - 0.90 10*3/mm3    Eosinophils, Absolute 0.01 0.00 - 0.40 10*3/mm3    Basophils, Absolute 0.02 0.00 - 0.20 10*3/mm3    Immature Grans, Absolute 0.03 0.00 - 0.05 10*3/mm3    nRBC 0.0 0.0 - 0.2 /100 WBC   Procalcitonin    Collection Time: 05/05/23  6:17 PM    Specimen: Blood   Result Value Ref Range    Procalcitonin 0.09 0.00 - 0.25 ng/mL   Respiratory Panel PCR  w/COVID-19(SARS-CoV-2) SUSU/LISA/MIGUEL [facility]NGEL/PAD/[facility]/MAD/[facility]Y In-House, NP Swab in UTM/VTM, 3-4 HR TAT - Swab, Nasopharynx    Collection Time: 05/05/23  6:19 PM    Specimen: Nasopharynx; Swab   Result Value Ref Range    ADENOVIRUS, PCR Not Detected Not Detected    Coronavirus 229E Not Detected Not Detected    Coronavirus HKU1 Not Detected Not Detected    Coronavirus NL63 Not Detected Not Detected    Coronavirus OC43 Not Detected Not Detected    COVID19 Not Detected Not Detected - Ref. Range    Human Metapneumovirus Not Detected Not Detected    Human Rhinovirus/Enterovirus Not Detected Not Detected    Influenza A PCR Not Detected Not Detected    Influenza B PCR Not Detected Not Detected    Parainfluenza Virus 1 Not Detected Not Detected    Parainfluenza Virus 2 Not Detected Not Detected    Parainfluenza Virus 3 Detected (A) Not Detected    Parainfluenza Virus 4 Not Detected Not Detected    RSV, PCR Not Detected Not Detected    Bordetella pertussis pcr Not Detected Not Detected    Bordetella parapertussis PCR Not Detected Not Detected    Chlamydophila pneumoniae PCR Not Detected Not Detected    Mycoplasma pneumo by PCR Not Detected Not Detected   Urinalysis With Culture If Indicated - Urine, Clean Catch    Collection Time: 05/05/23  6:19 PM    Specimen: Urine, Clean Catch   Result Value Ref Range    Color, UA Dark Yellow (A) Yellow, Straw    Appearance, UA Clear Clear    pH, UA 5.5 5.0 - 8.0    Specific Gravity, UA >=1.030 1.005 - 1.030    Glucose, UA Negative Negative    Ketones, UA Trace (A) Negative    Bilirubin, UA Negative Negative    Blood, UA Negative Negative    Protein, UA 30 mg/dL (1+) (A) Negative    Leuk Esterase, UA Trace (A) Negative    Nitrite, UA Negative Negative    Urobilinogen, UA 1.0 E.U./dL 0.2 - 1.0 E.U./dL   Urinalysis, Microscopic Only - Urine, Clean Catch    Collection Time: 05/05/23  6:19 PM    Specimen: Urine, Clean Catch   Result Value Ref Range    RBC, UA 0-2 None Seen, 0-2 /HPF    WBC, UA 0-2 None  Seen, 0-2 /HPF    Bacteria, UA None Seen None Seen /HPF    Squamous Epithelial Cells, UA 0-2 None Seen, 0-2 /HPF    Hyaline Casts, UA 7-12 None Seen /LPF    Methodology Automated Microscopy    ECG 12 Lead Dyspnea    Collection Time: 05/05/23  6:37 PM   Result Value Ref Range    QT Interval 325 ms       Ordered the above labs and independently reviewed the results.        RADIOLOGY  XR Chest 1 View    Result Date: 5/5/2023  XR CHEST 1 VW-  HISTORY: Male who is 72 years-old,  cough  TECHNIQUE: Frontal views of the chest  COMPARISON: 8/27/2020  FINDINGS: Heart, mediastinum and pulmonary vasculature are unremarkable. No focal pulmonary consolidation, pleural effusion, or pneumothorax. ON old granulomatous disease is seen. No acute osseous process.      No focal pulmonary consolidation. Follow-up as clinical indications persist.  This report was finalized on 5/5/2023 7:04 PM by Dr. Andrei Dumont M.D.        I ordered the above noted radiological studies. Reviewed by me and discussed with radiologist.  See dictation for official radiology interpretation.      PROCEDURES    Procedures        MEDICATIONS GIVEN IN ER    Medications   sodium chloride 0.9 % flush 10 mL (has no administration in time range)   ipratropium-albuterol (DUO-NEB) nebulizer solution 3 mL (3 mL Nebulization Given 5/5/23 1846)   acetaminophen (TYLENOL) tablet 1,000 mg (1,000 mg Oral Given 5/5/23 1904)   sodium chloride 0.9 % bolus 500 mL (0 mL Intravenous Stopped 5/5/23 2015)   benzonatate (TESSALON) capsule 200 mg (200 mg Oral Given 5/5/23 2049)   morphine injection 4 mg (4 mg Intravenous Given 5/5/23 2046)   methylPREDNISolone sodium succinate (SOLU-Medrol) injection 125 mg (125 mg Intravenous Given 5/5/23 2045)         PROGRESS, DATA ANALYSIS, CONSULTS, AND MEDICAL DECISION MAKING    My differential diagnosis for cough includes but is not limited to:  Upper respiratory infection, bronchitis, pneumonia, COPD exacerbation, cough variant asthma,  cardiac asthma, coronary artery disease, congestive heart failure, bacterial, viral or lung infections, lung cancer, aspiration pneumonitis, aspiration of foreign body and Covid -19    Total critical care time: Approximately 35 minutes    Due to a high probability of clinically significant, life threatening deterioration, the patient required my highest level of preparedness to intervene emergently and I personally spent this critical care time directly and personally managing the patient. This critical care time included obtaining a history; examining the patient; vital sign monitoring; ordering and review of studies; arranging urgent treatment with development of a management plan; evaluation of patient's response to treatment; frequent reassessment; and, discussions with other providers.    This critical care time was performed to assess and manage the high probability of imminent, life-threatening deterioration that could result in multi-organ failure. It was exclusive of separately billable procedures and treating other patients and teaching time.    Please see MDM section and the rest of the note for further information on patient assessment and treatment.      All labs have been independently reviewed by me.  All radiology studies have been reviewed by me and discussed with radiologist dictating the report.   EKG's independently viewed and interpreted by me.  Discussion below represents my analysis of pertinent findings related to patient's condition, differential diagnosis, treatment plan and final disposition.      ED Course as of 05/05/23 2105   Fri May 05, 2023   1849 EKG           EKG time: 1837  Rhythm/Rate: Sinus, 85  P waves and MS: ALEYAD within normal limits  QRS, axis: Narrow complex with slow R wave progression  ST and T waves: No STEMI; QTc within normal limits    Interpreted Contemporaneously by me, independently viewed  Comparison: 6/3/2017-no acute change   [RS]   1851 WBC, UA: 0-2 [RS]   1851  Bacteria, UA: None Seen [RS]   1851 Specific Gravity, UA: >=1.030  IV fluids infusing [RS]   1851 WBC(!): 12.07 [RS]   1851 Hemoglobin: 17.1 [RS]   1851 Immature Grans, Absolute: 0.03 [RS]   1851 RADIOLOGY      Study: Single view portable chest  Findings: No pneumothorax or large focal infiltrate appreciated  I independently viewed and interpreted these images contemporaneously with treatment.    [RS]   1905 Glucose(!): 111 [RS]   1905 BUN(!): 34 [RS]   1905 Creatinine: 1.18 [RS]   1905 Sodium: 138 [RS]   1905 Potassium: 4.2 [RS]   1905 Lactate: 1.9 [RS]   1905 HS Troponin T(!): 16 [RS]   1905 ALT (SGPT): 21 [RS]   1905 AST (SGOT): 23 [RS]   2011 Parainfluenza Virus 3(!): Detected [RS]   2013 Patient updated with findings and plan for admission.  Hesitantly agreeable. [RS]   2017 CONSULT        Provider: Carmen ValenzuelaGORDY    Discussion: Reviewed patient history, ED presentation and evaluation as well as therapies initiated in the ED.  Agreeable to accept patient for full admission with telemetry for further evaluation and treatment on behalf of Dr. Iqbal.    Agreeable c treatment and planned disposition.         [RS]      ED Course User Index  [RS] Tyshawn Quintana MD       AS OF 21:05 EDT VITALS:    BP - 133/98  HR - 92  TEMP - (!) 101.5 °F (38.6 °C)  O2 SATS - 93%        DIAGNOSIS  Final diagnoses:   Acute hypoxemic respiratory failure   Fever in adult   Parainfluenza infection   Chronic obstructive pulmonary disease, unspecified COPD type         DISPOSITION  ADMISSION    Discussed treatment plan and reason for admission with pt/family and admitting physician.  Pt/family voiced understanding of the plan for admission for further testing/treatment as needed.          Tyshawn Quintana MD  05/05/23 2105      Electronically signed by Tyshawn Quintana MD at 05/05/23 2105     Wellington Quintana RN at 05/05/23 1921        This RN assumed care of this pt.     Electronically signed by Wellington Quintana RN at 05/05/23 1922      Wellington Quintana, RN at 05/05/23 2110          Nursing report ED to floor  Db Hardin  72 y.o.  male    HPI :   Chief Complaint   Patient presents with   • Cough   • URI       Admitting doctor:   Lasha Iqbal DO    Admitting diagnosis:   The primary encounter diagnosis was Acute hypoxemic respiratory failure. Diagnoses of Fever in adult, Parainfluenza infection, and Chronic obstructive pulmonary disease, unspecified COPD type were also pertinent to this visit.    Code status:   Current Code Status       Date Active Code Status Order ID Comments User Context       Prior            Allergies:   Patient has no known allergies.    Isolation:   Enhanced Droplet/Contact     Intake and Output  No intake or output data in the 24 hours ending 05/05/23 2111    Weight:       05/05/23  1745   Weight: 71.7 kg (158 lb)       Most recent vitals:   Vitals:    05/05/23 1813 05/05/23 1846 05/05/23 1910 05/05/23 2106   BP:   133/98 110/66   BP Location:   Left arm    Patient Position:   Sitting    Pulse: 92  92 77   Resp:  22 22    Temp:       SpO2: 93%  93% 93%   Weight:       Height:           Active LDAs/IV Access:   Lines, Drains & Airways       Active LDAs       Name Placement date Placement time Site Days    Peripheral IV 05/05/23 1904 Anterior;Right Forearm 05/05/23 1904  Forearm  less than 1                    Labs (abnormal labs have a star):   Labs Reviewed   RESPIRATORY PANEL PCR W/ COVID-19 (SARS-COV-2) SUSU/LISA/MIGUEL ÁNGEL/PAD/[facility]AD/[facility] IN-HOUSE, NP SWAB IN UTM/VTP, 3-4 HR TAT - Abnormal; Notable for the following components:       Result Value    Parainfluenza Virus 3 Detected (*)     All other components within normal limits    Narrative:     In the setting of a positive respiratory panel with a viral infection PLUS a negative procalcitonin without other underlying concern for bacterial infection, consider observing off antibiotics or discontinuation of antibiotics and continue supportive care. If the respiratory  panel is positive for atypical bacterial infection (Bordetella pertussis, Chlamydophila pneumoniae, or Mycoplasma pneumoniae), consider antibiotic de-escalation to target atypical bacterial infection.   COMPREHENSIVE METABOLIC PANEL - Abnormal; Notable for the following components:    Glucose 111 (*)     BUN 34 (*)     CO2 30.0 (*)     Total Bilirubin 1.3 (*)     BUN/Creatinine Ratio 28.8 (*)     All other components within normal limits    Narrative:     GFR Normal >60  Chronic Kidney Disease <60  Kidney Failure <15    The GFR formula is only valid for adults with stable renal function between ages 18 and 70.   URINALYSIS W/ CULTURE IF INDICATED - Abnormal; Notable for the following components:    Color, UA Dark Yellow (*)     Ketones, UA Trace (*)     Protein, UA 30 mg/dL (1+) (*)     Leuk Esterase, UA Trace (*)     All other components within normal limits    Narrative:     In absence of clinical symptoms, the presence of pyuria, bacteria, and/or nitrites on the urinalysis result does not correlate with infection.   TROPONIN - Abnormal; Notable for the following components:    HS Troponin T 16 (*)     All other components within normal limits    Narrative:     High Sensitive Troponin T Reference Range:  <10.0 ng/L- Negative Female for AMI  <15.0 ng/L- Negative Male for AMI  >=10 - Abnormal Female indicating possible myocardial injury.  >=15 - Abnormal Male indicating possible myocardial injury.   Clinicians would have to utilize clinical acumen, EKG, Troponin, and serial changes to determine if it is an Acute Myocardial Infarction or myocardial injury due to an underlying chronic condition.        CBC WITH AUTO DIFFERENTIAL - Abnormal; Notable for the following components:    WBC 12.07 (*)     MCH 33.6 (*)     Neutrophil % 79.0 (*)     Lymphocyte % 11.8 (*)     Eosinophil % 0.1 (*)     Neutrophils, Absolute 9.53 (*)     Monocytes, Absolute 1.05 (*)     All other components within normal limits   PROTIME-INR -  "Normal   APTT - Normal   LACTIC ACID, PLASMA - Normal   PROCALCITONIN - Normal    Narrative:     As a Marker for Sepsis (Non-Neonates):    1. <0.5 ng/mL represents a low risk of severe sepsis and/or septic shock.  2. >2 ng/mL represents a high risk of severe sepsis and/or septic shock.    As a Marker for Lower Respiratory Tract Infections that require antibiotic therapy:    PCT on Admission    Antibiotic Therapy       6-12 Hrs later    >0.5                Strongly Recommended  >0.25 - <0.5        Recommended   0.1 - 0.25          Discouraged              Remeasure/reassess PCT  <0.1                Strongly Discouraged     Remeasure/reassess PCT    As 28 day mortality risk marker: \"Change in Procalcitonin Result\" (>80% or <=80%) if Day 0 (or Day 1) and Day 4 values are available. Refer to http://[email or web address] [email or web address]-pct-[email or web address]    Change in PCT <=80%  A decrease of PCT levels below or equal to 80% defines a positive change in PCT test result representing a higher risk for 28-day all-cause mortality of patients diagnosed with severe sepsis for septic shock.    Change in PCT >80%  A decrease of PCT levels of more than 80% defines a negative change in PCT result representing a lower risk for 28-day all-cause mortality of patients diagnosed with severe sepsis or septic shock.      BLOOD CULTURE   BLOOD CULTURE   URINALYSIS, MICROSCOPIC ONLY   HIGH SENSITIVITIY TROPONIN T 2HR   CBC AND DIFFERENTIAL    Narrative:     The following orders were created for panel order CBC & Differential.  Procedure                               Abnormality         Status                     ---------                               -----------         ------                     CBC Auto Differential[[id number]]        Abnormal            Final result                 Please view results for these tests on the individual orders.       EKG:   ECG 12 Lead Dyspnea   Preliminary Result   HEART RATE= 86  bpm   RR Interval= 698  ms   MO Interval= 166  ms "   P Horizontal Axis= -1  deg   P Front Axis= 43  deg   QRSD Interval= 83  ms   QT Interval= 325  ms   QRS Axis= 50  deg   T Wave Axis= 86  deg   - OTHERWISE NORMAL ECG -   Sinus rhythm   Low voltage, extremity leads   Abnormal R-wave progression, late transition   Electronically Signed By:    Date and Time of Study: 2023 18:37:15          Meds given in ED:   Medications   sodium chloride 0.9 % flush 10 mL (has no administration in time range)   ipratropium-albuterol (DUO-NEB) nebulizer solution 3 mL (3 mL Nebulization Given 23)   acetaminophen (TYLENOL) tablet 1,000 mg (1,000 mg Oral Given 23)   sodium chloride 0.9 % bolus 500 mL (0 mL Intravenous Stopped 23)   benzonatate (TESSALON) capsule 200 mg (200 mg Oral Given 23)   morphine injection 4 mg (4 mg Intravenous Given 23)   methylPREDNISolone sodium succinate (SOLU-Medrol) injection 125 mg (125 mg Intravenous Given 23)       Imaging results:  XR Chest 1 View    Result Date: 2023  No focal pulmonary consolidation. Follow-up as clinical indications persist.  This report was finalized on 2023 7:04 PM by Dr. Andrei Dumont M.D.       Ambulatory status:   - standby    Social issues:   Social History     Socioeconomic History   • Marital status:    Tobacco Use   • Smoking status: Former     Packs/day: 2.00     Years: 30.00     Pack years: 60.00     Types: Cigarettes     Quit date: 2011     Years since quittin.8   • Smokeless tobacco: Never   • Tobacco comments:     caffeine use - coffee on occas.    Substance and Sexual Activity   • Alcohol use: Yes     Alcohol/week: 1.0 standard drink     Types: 1 Cans of beer per week     Comment: beer --occ   • Drug use: No   • Sexual activity: Defer       NIH Stroke Scale:         Wellington Quintana RN  23 21:11 EDT          Electronically signed by Wellington Quitnana RN at 23 2111       Orders (all)      Start     Ordered    23  2100  guaiFENesin (MUCINEX) 12 hr tablet 1,200 mg  Every 12 Hours Scheduled         05/07/23 0929    05/07/23 1030  methylPREDNISolone sodium succinate (SOLU-Medrol) injection 60 mg  Every 12 Hours,   Status:  Discontinued         05/07/23 0933    05/07/23 1030  methylPREDNISolone sodium succinate (SOLU-Medrol) injection 125 mg  Every 12 Hours         05/07/23 0934    05/07/23 0934  XR Chest 1 View  1 Time Imaging         05/07/23 0933    05/07/23 0929  Oscillating Positive Expiratory Pressure (OPEP)  Once         05/07/23 0928    05/07/23 0928  Inpatient Pulmonology Consult  Once        Specialty:  Pulmonary Disease  Provider:  Fei Tran Jr., MD    05/07/23 0928    05/07/23 0917  Procalcitonin  Once         05/07/23 0916    05/07/23 0638  POC Glucose Once  PROCEDURE ONCE         05/07/23 0636    05/07/23 0600  Document Pulse Oximetry - On Room Air / Home O2 Level  Daily      Comments: Room Air Oxygen Levels Should Only Be Measured if Patient Oxygen Needs are <4L  Home O2 Oxygen Levels Should Only Be Measured Once Patient Within 2L of Home O2 Baseline  Reapply Oxygen if O2 Sat Drops Below 88%    05/05/23 2119    05/06/23 2100  montelukast (SINGULAIR) tablet 10 mg  Nightly         05/06/23 0259    05/06/23 1943  POC Glucose Once  PROCEDURE ONCE         05/06/23 1940    05/06/23 1931  sodium chloride nasal spray 2 spray  As Needed         05/06/23 1931    05/06/23 1655  POC Glucose Once  PROCEDURE ONCE         05/06/23 1652    05/06/23 1600  Enoxaparin Sodium (LOVENOX) syringe 40 mg  Every 24 Hours         05/06/23 1357    05/06/23 1342  Pharmacy to Dose enoxaparin (LOVENOX)  Continuous PRN,   Status:  Discontinued         05/06/23 1342    05/06/23 1125  melatonin tablet 5 mg  Nightly PRN         05/06/23 1125    05/06/23 1119  POC Glucose Once  PROCEDURE ONCE         05/06/23 1117    05/06/23 0900  methylPREDNISolone sodium succinate (SOLU-Medrol) injection 40 mg  Daily,   Status:  Discontinued        Note  to Pharmacy: The Solu-Medrol initiated in ED    05/05/23 2119 05/06/23 0900  budesonide (PULMICORT) nebulizer solution 0.5 mg  Daily         05/06/23 0017    05/06/23 0900  pravastatin (PRAVACHOL) tablet 40 mg  Daily         05/06/23 0017    05/06/23 0900  tamsulosin (FLOMAX) 24 hr capsule 0.4 mg  Daily         05/06/23 0259    05/06/23 0858  High Sensitivity Troponin T  Once         05/06/23 0857    05/06/23 0800  Oral Care  2 Times Daily       05/05/23 2119 05/06/23 0800  insulin lispro (HUMALOG/ADMELOG) injection 0-7 Units  3 Times Daily With Meals         05/05/23 2119 05/06/23 0800  predniSONE (DELTASONE) tablet 40 mg  Daily With Breakfast,   Status:  Discontinued         05/05/23 2333    05/06/23 0702  Inpatient Cardiology Consult  IN AM        Specialty:  Cardiology  Provider:  Sidney Resendiz MD    05/06/23 0004    05/06/23 0700  POC Glucose TID AC  3 Times Daily Before Meals       05/05/23 2119 05/06/23 0634  POC Glucose Once  PROCEDURE ONCE         05/06/23 0625    05/06/23 0617  Blood Gas, Arterial -  PROCEDURE ONCE         05/06/23 0606    05/06/23 0600  Tobacco Cessation Education  Daily      Comments: Document in Education Activity    05/05/23 2119 05/06/23 0600  Respiratory Treatment Education (MDI / Spacer / Nebulizer)  Daily      Comments: Document in Education Activity    05/05/23 2119 05/06/23 0600  COPD Education  Daily      Comments: Document in Education Activity    05/05/23 2119 05/06/23 0600  Basic Metabolic Panel  Morning Draw,   Status:  Canceled         05/05/23 2119 05/06/23 0600  CBC Auto Differential  Morning Draw,   Status:  Canceled         05/05/23 2119 05/06/23 0600  Hemoglobin A1c  Morning Draw         05/06/23 0013    05/06/23 0600  levothyroxine (SYNTHROID, LEVOTHROID) tablet 75 mcg  Daily         05/06/23 0017    05/06/23 0600  Basic Metabolic Panel  Daily       05/06/23 0258    05/06/23 0600  CBC Auto Differential  Daily       05/06/23 3275     05/06/23 0600  Blood Gas, Arterial -  Morning Draw         05/06/23 0313    05/06/23 0259  Aspiration Precautions  Continuous         05/06/23 0258    05/06/23 0259  Elevate HOB  Continuous         05/06/23 0258    05/06/23 0259  Fall Precautions  Continuous         05/06/23 0258    05/06/23 0259  PT Consult: Eval & Treat Discharge Placement Assessment, Functional Mobility Below Baseline  Once         05/06/23 0258    05/06/23 0259  OT Consult: Eval & Treat  Once         05/06/23 0258    05/06/23 0259  Code Status and Medical Interventions:  Continuous         05/06/23 0258    05/06/23 0230  arformoterol (BROVANA) nebulizer solution 15 mcg  2 Times Daily - RT         05/06/23 0017    05/06/23 0115  aspirin EC tablet 81 mg  Once         05/06/23 0017    05/06/23 0015  ipratropium-albuterol (DUO-NEB) nebulizer solution 3 mL  4 Times Daily - RT         05/05/23 2329 05/06/23 0000  Vital Signs  Every 4 Hours       05/05/23 2119 05/06/23 0000  Cough / Deep Breathe  Every 4 Hours       05/05/23 2119 05/05/23 2332  albuterol (PROVENTIL) nebulizer solution 0.083% 2.5 mg/3mL  Every 6 Hours PRN         05/05/23 2333 05/05/23 2200  Incentive Spirometry  Every 4 Hours While Awake       05/05/23 2119 05/05/23 2145  morphine injection 4 mg  Every 4 Hours PRN         05/05/23 2145 05/05/23 2121  guaiFENesin (MUCINEX) 12 hr tablet 600 mg  Every 12 Hours Scheduled,   Status:  Discontinued         05/05/23 2119 05/05/23 2120  Daily Weights  Daily       05/05/23 2119 05/05/23 2119  HYDROcodone Bit-Homatrop MBr (HYCODAN) 5-1.5 MG/5ML solution 5 mL  Every 4 Hours PRN         05/05/23 2119 05/05/23 2119  Notify Provider if POC Glucose Greater Than 180 x2  Until Discontinued         05/05/23 2119 05/05/23 2118  ondansetron (ZOFRAN) tablet 4 mg  Every 6 Hours PRN        See Hyperspace for full Linked Orders Report.    05/05/23 2119 05/05/23 2118  ondansetron (ZOFRAN) injection 4 mg  Every 6 Hours PRN         See Hyperspace for full Linked Orders Report.    05/05/23 2119 05/05/23 2118  dextrose (GLUTOSE) oral gel 15 g  Every 15 Minutes PRN         05/05/23 2119 05/05/23 2118  dextrose (D50W) (25 g/50 mL) IV injection 25 g  Every 15 Minutes PRN         05/05/23 2119 05/05/23 2118  glucagon (GLUCAGEN) injection 1 mg  Every 15 Minutes PRN         05/05/23 2119 05/05/23 2117  acetaminophen (TYLENOL) tablet 650 mg  Every 4 Hours PRN        See Hyperspace for full Linked Orders Report.    05/05/23 2119 05/05/23 2117  acetaminophen (TYLENOL) 160 MG/5ML solution 650 mg  Every 4 Hours PRN        See Hyperspace for full Linked Orders Report.    05/05/23 2119 05/05/23 2117  acetaminophen (TYLENOL) suppository 650 mg  Every 4 Hours PRN        See Hyperspace for full Linked Orders Report.    05/05/23 2119 05/05/23 2117  ipratropium-albuterol (DUO-NEB) nebulizer solution 3 mL  Every 4 Hours PRN,   Status:  Discontinued         05/05/23 2119 05/05/23 2117  Place Sequential Compression Device  Once         05/05/23 2119 05/05/23 2117  Maintain Sequential Compression Device  Continuous         05/05/23 2119 05/05/23 2117  Telemetry - Maintain IV Access  Continuous         05/05/23 2119 05/05/23 2117  Continuous Cardiac Monitoring  Continuous,   Status:  Canceled        Comments: Follow Standing Orders As Outlined in Process Instructions (Open Order Report to View Full Instructions)    05/05/23 2119 05/05/23 2117  May Be Off Telemetry for Tests  Continuous         05/05/23 2119 05/05/23 2117  Cardiac Monitoring  Continuous        Comments: Follow Standing Orders As Outlined in Process Instructions (Open Order Report to View Full Instructions)    05/05/23 2119 05/05/23 2117  Pulse Oximetry, Continuous  Continuous         05/05/23 2119 05/05/23 2117  Diet: Cardiac Diets; Healthy Heart (2-3 Na+); Texture: Regular Texture (IDDSI 7); Fluid Consistency: Thin (IDDSI 0)  Diet Effective Now          05/05/23 2119 05/05/23 2116  Telemetry - Pulse Oximetry  Continuous PRN,   Status:  Canceled      Comments: If Patient Develops Unresponsiveness, Acute Dyspnea, Cyanosis or Suspected Hypoxemia Start Continuous Pulse Ox Monitoring, Apply Oxygen & Notify Provider    05/05/23 2119 05/05/23 2116  nitroglycerin (NITROSTAT) SL tablet 0.4 mg  Every 5 Minutes PRN         05/05/23 2119 05/05/23 2112  Patient Isolation Droplet  Continuous         05/05/23 2111 05/05/23 2112  Reason for COPD Admission: Upper Respiratory Infection  Once         05/05/23 2119 05/05/23 2112  Intake & Output  Every Shift       05/05/23 2119 05/05/23 2112  Weigh Patient  Once         05/05/23 2119 05/05/23 2112  Saline Lock & Maintain IV Access  Continuous,   Status:  Canceled         05/05/23 2119 05/05/23 2104  Inpatient Admission  Once         05/05/23 2103 05/05/23 2019  benzonatate (TESSALON) capsule 200 mg  Once         05/05/23 2017 05/05/23 2019  morphine injection 4 mg  Once         05/05/23 2017    05/05/23 2019  methylPREDNISolone sodium succinate (SOLU-Medrol) injection 125 mg  Once         05/05/23 2017 05/05/23 2017  High Sensitivity Troponin T 2Hr  PROCEDURE ONCE         05/05/23 1855 05/05/23 2013  [facility] (on-call MD unless specified) Details  Once        Specialty:  Hospitalist  Provider:  (Not yet assigned)    05/05/23 2013 05/05/23 1907  Procalcitonin  Once         05/05/23 1906    05/05/23 1836  Urinalysis, Microscopic Only - Urine, Clean Catch  Once         05/05/23 1835 05/05/23 1826  acetaminophen (TYLENOL) tablet 1,000 mg  Once         05/05/23 1825    05/05/23 1826  sodium chloride 0.9 % bolus 500 mL  Once         05/05/23 1825 05/05/23 1824  methylPREDNISolone sodium succinate (SOLU-Medrol) injection 125 mg  Once,   Status:  Discontinued         05/05/23 1822    05/05/23 1823  ipratropium-albuterol (DUO-NEB) nebulizer solution 3 mL  Once         05/05/23 1821 05/05/23 1803  XR  Pt. will be educated for hand placement for transfers within 2-3 sessions. Chest 1 View  1 Time Imaging         05/05/23 1749    05/05/23 1801  CBC & Differential  Once         05/05/23 1800    05/05/23 1801  Comprehensive Metabolic Panel  Once         05/05/23 1800    05/05/23 1801  Protime-INR  Once         05/05/23 1800    05/05/23 1801  aPTT  Once         05/05/23 1800    05/05/23 1801  Urinalysis With Culture If Indicated - Urine, Clean Catch  Once         05/05/23 1800    05/05/23 1801  High Sensitivity Troponin T  Once         05/05/23 1800    05/05/23 1801  Blood Culture - Blood, Arm, Left  Once         05/05/23 1800    05/05/23 1801  Blood Culture - Blood, Arm, Right  Once         05/05/23 1800    05/05/23 1801  Lactic Acid, Plasma  Once         05/05/23 1800    05/05/23 1801  ECG 12 Lead Dyspnea  Once         05/05/23 1800    05/05/23 1801  CBC Auto Differential  PROCEDURE ONCE         05/05/23 1800    05/05/23 1800  Respiratory Panel PCR w/COVID-19(SARS-CoV-2) SUSU/LISA/MIGUEL ÁNGEL/PAD/COR/MAD/HUY In-House, NP Swab in UTM/VTM, 3-4 HR TAT - Swab, Nasopharynx  STAT         05/05/23 1800    05/05/23 1800  Insert Peripheral IV  Once        See Hyperspace for full Linked Orders Report.    05/05/23 1800    05/05/23 1800  Monitor Blood Pressure  Per Hospital Policy         05/05/23 1800    05/05/23 1800  Cardiac Monitoring  Continuous,   Status:  Canceled        Comments: Follow Standing Orders As Outlined in Process Instructions (Open Order Report to View Full Instructions)    05/05/23 1800    05/05/23 1800  Pulse Oximetry, Continuous  Continuous,   Status:  Canceled         05/05/23 1800    05/05/23 1800  XR Chest 1 View  1 Time Imaging,   Status:  Canceled         05/05/23 1800    05/05/23 1759  sodium chloride 0.9 % flush 10 mL  As Needed        See Hyperspace for full Linked Orders Report.    05/05/23 1800    05/05/23 1750  XR Chest 2 View  1 Time Imaging,   Status:  Canceled         05/05/23 1749    Unscheduled  Oxygen Therapy- Nasal Cannula; Titrate for SPO2: 90% - 95%  Continuous PRN       Comments: If Patient Develops Unresponsiveness, Acute Dyspnea, Cyanosis or Suspected Hypoxemia Start Continuous Pulse Ox Monitoring, Apply Oxygen & Notify Provider    23    Unscheduled  ECG 12 Lead Chest Pain  As Needed      Comments: Nurse to Release if Patient Expericences Acute Chest Pain or Dysrhythmias    23    Unscheduled  Potassium  As Needed      Comments: For Ventricular Arrhythmias      23    Unscheduled  Magnesium  As Needed      Comments: For Ventricular Arrhythmias      23    Unscheduled  High Sensitivity Troponin T  As Needed      Comments: For Chest Pain      23    Unscheduled  Blood Gas, Arterial -  As Needed      Comments: Draw for Acute Dyspnea, Cyanosis, Suspected Hypoxemia or UnresponsivenessNotify Provider of Results      23    Unscheduled  Up With Assistance  As Needed       23    Unscheduled  Follow Hypoglycemia Standing Orders For Blood Glucose <70 & Notify Provider of Treatment  As Needed      Comments: Follow Hypoglycemia Orders As Outlined in Process Instructions (Open Order Report to View Full Instructions)  Notify Provider Any Time Hypoglycemia Treatment is Administered    23    --  montelukast (SINGULAIR) 10 MG tablet  Nightly         23    --  Budeson-Glycopyrrol-Formoterol (BREZTRI AEROSPHERE IN)         23    Pending  Inpatient Case Management  Consult  Once        Provider:  (Not yet assigned)    Pending    --  SCANNED - TELEMETRY           23 0000    --  SCANNED - TELEMETRY           23 0000    --  SCANNED - TELEMETRY           23 0000                Operative/Procedure Notes (all)    No notes of this type exist for this encounter.            Physician Progress Notes (all)      Miriam Garcia, MICHAEL at 23 0902                [facility]    Patient Name: Db Hardin  :1950  72 y.o.  LOS: 2  Encounter  "Provider: MICHAEL Perez      Patient Care Team:  Lily Porter MD as PCP - General (Pediatrics)    Chief Complaint: Follow-up cough, dyspnea, elevated troponin    Interval History: Remains with dyspnea, dry cough.  Troponin back to baseline.       Objective   Vital Signs  Temp:  [97 °F (36.1 °C)-99 °F (37.2 °C)] 99 °F (37.2 °C)  Heart Rate:  [] 102  Resp:  [18-20] 18  BP: ()/(64-69) 97/69    Intake/Output Summary (Last 24 hours) at 5/7/2023 0902  Last data filed at 5/6/2023 1503  Gross per 24 hour   Intake 720 ml   Output 100 ml   Net 620 ml     Flowsheet Rows    Flowsheet Row First Filed Value   Admission Height 167.6 cm (66\") Documented at 05/05/2023 1745   Admission Weight 71.7 kg (158 lb) Documented at 05/05/2023 1745            Constitutional:       Appearance: Normal appearance. Well-developed.   Eyes:      Conjunctiva/sclera: Conjunctivae normal.   Neck:      Vascular: No carotid bruit.   Pulmonary:      Effort: Pulmonary effort is normal.      Breath sounds: Decreased breath sounds present.   Cardiovascular:      Normal rate. Regular rhythm. Normal S1. Normal S2.      Murmurs: There is no murmur.      No gallop. No click. No rub.   Edema:     Peripheral edema absent.   Musculoskeletal: Normal range of motion. Skin:     General: Skin is warm and dry.   Neurological:      Mental Status: Alert and oriented to person, place, and time.      GCS: GCS eye subscore is 4. GCS verbal subscore is 5. GCS motor subscore is 6.   Psychiatric:         Speech: Speech normal.         Behavior: Behavior normal.         Thought Content: Thought content normal.         Judgment: Judgment normal.           Pertinent Test Results:  Results from last 7 days   Lab Units 05/07/23  0513 05/06/23  0625 05/05/23  1817   SODIUM mmol/L 132* 133* 138   POTASSIUM mmol/L 4.4 4.4 4.2   CHLORIDE mmol/L 98 100 99   CO2 mmol/L 26.3 23.3 30.0*   BUN mg/dL 32* 32* 34*   CREATININE mg/dL 1.05 1.02 1.18   GLUCOSE mg/dL 118* " 179* 111*   CALCIUM mg/dL 9.0 8.8 9.0   AST (SGOT) U/L  --   --  23   ALT (SGPT) U/L  --   --  21     Results from last 7 days   Lab Units 05/06/23  0625 05/05/23 2052 05/05/23  1817   HSTROP T ng/L 9 23* 16*     Results from last 7 days   Lab Units 05/07/23  0513 05/06/23  0625 05/05/23 1817   WBC 10*3/mm3 13.91* 11.29* 12.07*   HEMOGLOBIN g/dL 14.2 14.8 17.1   HEMATOCRIT % 39.5 41.9 48.4   PLATELETS 10*3/mm3 185 162 203     Results from last 7 days   Lab Units 05/05/23 1817   INR  1.00   APTT seconds 24.1               Invalid input(s): LDLCALC                Medication Review:   arformoterol, 15 mcg, Nebulization, BID - RT  aspirin, 81 mg, Oral, Once  budesonide, 0.5 mg, Nebulization, Daily  enoxaparin, 40 mg, Subcutaneous, Q24H  guaiFENesin, 600 mg, Oral, Q12H  insulin lispro, 0-7 Units, Subcutaneous, TID With Meals  ipratropium-albuterol, 3 mL, Nebulization, 4x Daily - RT  levothyroxine, 75 mcg, Oral, Daily  montelukast, 10 mg, Oral, Nightly  pravastatin, 40 mg, Oral, Daily  predniSONE, 40 mg, Oral, Daily With Breakfast  tamsulosin, 0.4 mg, Oral, Daily              Assessment & Plan     Active Hospital Problems    Diagnosis  POA   • **Acute hypoxemic respiratory failure [J96.01]  Yes   • Elevated troponin [R77.8]  Unknown   • COPD exacerbation [J44.1]  Yes   • Acute on chronic respiratory failure with hypoxia [J96.21]  Yes   • Parainfluenza [B34.8]  Unknown   • Type 2 diabetes mellitus [E11.9]  Yes   • S/P coronary artery stent placement [Z95.5]  Not Applicable   • Hypertension [I10]  Yes      Resolved Hospital Problems   No resolved problems to display.        1. Parainfluenza pneumonia: Continue supportive care  2. Indeterminant troponin elevation: NSTEMI type II, not consistent with ACS.  No angina.  Recent negative stress test.  Troponin negative this morning.  3. CAD: Continue current GDMT  4. Advanced COPD: Defer to medicine/pulmonology    Troponin now back to baseline.    Symptoms consistent with  parainfluenza pneumonia    No additional testing required from cardiovascular standpoint    CV services will sign off, please do not hesitate to reach out to if services needed in the future.      MICHAEL Perez  [facility]  23  09:02 EDT            Electronically signed by Miriam Garcia APRN at 23 0998     Valery Pond MD at 23 [phone]              Name: Db Hardin ADMIT: 2023   : 1950  PCP: Lily Porter MD    MRN: [id number] LOS: 1 days   AGE/SEX: 72 y.o. male  ROOM: [facility]     Subjective   Subjective   Laying in bed, spouse present at bedside.  Patient feeling better, shortness of breath improved..  States coughing up dark/gray sputum.  No fevers, chills, chest pain, palpitations.  No nausea or vomiting.    Review of Systems  As above  Objective   Objective   Vital Signs  Temp:  [97.6 °F (36.4 °C)-101.5 °F (38.6 °C)] 98.5 °F (36.9 °C)  Heart Rate:  [] 66  Resp:  [16-28] 18  BP: (110-133)/(66-98) 117/68  SpO2:  [87 %-99 %] 92 %  on  Flow (L/min):  [2-4] 2;   Device (Oxygen Therapy): nasal cannula  Body mass index is 24.32 kg/m².  Physical Exam    General: Alert and oriented x3, no acute distress  HEENT: Normocephalic, atraumatic  CV: Regular rate and rhythm, no murmurs rubs or gallops  Lungs: Clear to auscultation bilaterally, no crackles or wheezes  Abdomen: Soft, nontender, nondistended  Extremities: No significant peripheral edema , no cyanosis     Results Review     I reviewed the patient's new clinical results.  Results from last 7 days   Lab Units 23  0625 23   WBC 10*3/mm3 11.29* 12.07*   HEMOGLOBIN g/dL 14.8 17.1   PLATELETS 10*3/mm3 162 203     Results from last 7 days   Lab Units 23  0625 05/05/23  1817   SODIUM mmol/L 133* 138   POTASSIUM mmol/L 4.4 4.2   CHLORIDE mmol/L 100 99   CO2 mmol/L 23.3 30.0*   BUN mg/dL 32* 34*   CREATININE mg/dL 1.02 1.18   GLUCOSE mg/dL 179* 111*   Estimated Creatinine  Clearance: 62.4 mL/min (by C-G formula based on SCr of 1.02 mg/dL).  Results from last 7 days   Lab Units 05/05/23  1817   ALBUMIN g/dL 4.7   BILIRUBIN mg/dL 1.3*   ALK PHOS U/L 104   AST (SGOT) U/L 23   ALT (SGPT) U/L 21     Results from last 7 days   Lab Units 05/06/23  0625 05/05/23  1817   CALCIUM mg/dL 8.8 9.0   ALBUMIN g/dL  --  4.7     Results from last 7 days   Lab Units 05/05/23  1817   PROCALCITONIN ng/mL 0.09   LACTATE mmol/L 1.9     COVID19   Date Value Ref Range Status   05/05/2023 Not Detected Not Detected - Ref. Range Final     Hemoglobin A1C   Date/Time Value Ref Range Status   05/06/2023 0625 5.70 (H) 4.80 - 5.60 % Final     Glucose   Date/Time Value Ref Range Status   05/06/2023 1117 219 (H) 70 - 130 mg/dL Final     Comment:     Meter: [id number] : [id number] Jaskaran PEDERSEN   05/06/2023 0625 160 (H) 70 - 130 mg/dL Final     Comment:     Meter: [id number] : [id number] Micheal Santa       XR Chest 1 View  Narrative: XR CHEST 1 VW-     HISTORY: Male who is 72 years-old,  cough     TECHNIQUE: Frontal views of the chest     COMPARISON: 8/27/2020     FINDINGS: Heart, mediastinum and pulmonary vasculature are unremarkable.  No focal pulmonary consolidation, pleural effusion, or pneumothorax. ON  old granulomatous disease is seen. No acute osseous process.     Impression: No focal pulmonary consolidation. Follow-up as clinical  indications persist.     This report was finalized on 5/5/2023 7:04 PM by Dr. Andrei Dumont M.D.       Scheduled Medications  arformoterol, 15 mcg, Nebulization, BID - RT  aspirin, 81 mg, Oral, Once  budesonide, 0.5 mg, Nebulization, Daily  guaiFENesin, 600 mg, Oral, Q12H  insulin lispro, 0-7 Units, Subcutaneous, TID With Meals  ipratropium-albuterol, 3 mL, Nebulization, 4x Daily - RT  levothyroxine, 75 mcg, Oral, Daily  montelukast, 10 mg, Oral, Nightly  pravastatin, 40 mg, Oral, Daily  predniSONE, 40 mg, Oral, Daily With Breakfast  tamsulosin, 0.4 mg, Oral,  Daily    Infusions  Pharmacy to Dose enoxaparin (LOVENOX),     Diet  Diet: Cardiac Diets; Healthy Heart (2-3 Na+); Texture: Regular Texture (IDDSI 7); Fluid Consistency: Thin (IDDSI 0)      Assessment/Plan     Active Hospital Problems    Diagnosis  POA   • **Acute hypoxemic respiratory failure [J96.01]  Yes   • Elevated troponin [R77.8]  Unknown   • COPD exacerbation [J44.1]  Yes   • Acute on chronic respiratory failure with hypoxia [J96.21]  Yes   • Parainfluenza [B34.8]  Unknown   • Type 2 diabetes mellitus [E11.9]  Yes   • S/P coronary artery stent placement [Z95.5]  Not Applicable   • Hypertension [I10]  Yes      Resolved Hospital Problems   No resolved problems to display.       Patient is a 72-year-old male with history of COPD, chronic respiratory failure with hypoxia (on 2 L home O2), type 2 diabetes mellitus, CAD, hypertension who presents to [facility] with complaints of worsening dyspnea, cough, congestion over the past 4 to 5 days prior to arrival.     Acute on chronic respiratory failure with hypoxia secondary to Parainfluenza virus 3 and COPD with acute exacerbation  - Meets criteria for diagnosis with: dyspnea, documented O2 sat 87% on room air, increased oxygen requirements.  - RVP + for parainfluenza virus, WBC slightly elevated but has taken steroids, procalcitonin negative.  - CXR negative for evidence of focal consolidation.  - Corticosteroids, scheduled and PRN bronchodilators, PRN medications for symptom control, telemetry, pulse oximetry.  -ABG with normal pH and CO2.  -Symptoms improving, continue current regimen.       CAD with elevated troponin  - Etiology of troponin elevation likely 2/2 acute illness, no signs or symptoms of ACS at present.  - Cardiology consulted. Will continue to follow their plans/recommendations. Greatly appreciate their help.  - Telemetry monitoring.  -Troponin trended down, now within normal limits.  Cardiology evaluated.  Ischemic type II, no ACS.   No further work-up indicated.  -Monitor symptoms.    Hypertension  -BP stable on current regimen.  Continue.      Hyperlipidemia  - Stable. Continue Statin as prescribed.     BPH  - Stable. Continue Tamsulosin as prescribed.      Hyperglycemia secondary to steroids.  Hemoglobin A1c 5.7.  Continue sliding scale insulin.      Discussed with patient, spouse  DVT prophylax: Initiate Lovenox  Disposition: Home, likely in 1 to 2 days if continues to improve.      I wore protective equipment throughout this patient encounter including a face mask, gloves and protective eyewear.  Hand hygiene was performed before donning protective equipment and after removal when leaving the room.      Dictated utilizing Dragon dictation        Valery Pond MD  [facility]  23  13:43 EDT        Electronically signed by Valery Pond MD at 23 1343          Consult Notes (all)      Abhijit Da Silva Jr., MD at 23 0916      Consult Orders    1. Inpatient Cardiology Consult [[id number]] ordered by Vicenta Watkins APRN at 23 0004                     [facility]        Patient Name: bD Hardin  Age/Sex: 72 y.o. male  : 1950  MRN: [id number]    Date of Admission: 2023  Date of Encounter Visit: 23  Encounter Provider: Abhijit Da Silva MD  Referring Provider: Lasha Iqbal DO  Place of Service: [facility] CARDIOLOGY  Patient Care Team:  Lily Porter MD as PCP - General (Pediatrics)    Subjective:     Chief Complaint: cough and shortness of air    Reason for consult: dyspnea and elevated troponin    History of Present Illness:  Db Hardin is a 72 y.o. male who follows Dr. Resendiz in our office.  He has past medical history of hypertension, hyperlipidemia, lower extremity edema, prediabetes,  COPD with emphysema and oxygen use ,  coronary artery disease with STEMI in  and bare-metal stent placement x3, abdominal  aortic aneurysm.      He presented with cough, fever and shortness of breath.  He reported recent diagnoses of a sinus infection and taking his medication as prescribed however he had no improvement.  Patient was admitted with acute respiratory failure.  We have been asked to be seen for dyspnea and elevated troponin . His oxygen was 87 % and temp 101.5. duo nebs breathing treatments have been scheduled and he is currently getting Prednisone for suspected COPD exacerbation. Respiratory viral panel detected parainfluenza.     No angina.  Nonspecific EKG findings.  Indeterminant troponin elevation.  Negative stress study in September 2022.  Respiratory complaints mildly improved.    HPI was reviewed, updated and amended when necessary.      HsTn 16 and repeat 23 with Troponin T of 7.   Blood culture x 2 pending  sCr 1.02, BUN 32 K 4.4  WBC initially 12.07 and repeat 11.29     Prior Cardiac Testing:  Echocardiogram 08/29/2022  • Estimated right ventricular systolic pressure from tricuspid regurgitation is normal (<35 mmHg). Calculated right ventricular systolic pressure from tricuspid regurgitation is 22.8 mmHg.  • Calculated left ventricular EF = 58.9% Estimated left ventricular EF was in agreement with the calculated left ventricular EF. Left ventricular systolic function is normal.  • There is calcification of the aortic valve.  • Left ventricular diastolic function was normal.    Perfusion stress test 09/12/2022   •  Myocardial perfusion imaging indicates a normal myocardial perfusion study with no evidence of ischemia.  • Diaphragmatic attenuation artifact is present.  • Left ventricular ejection fraction is normal. (Calculated EF = 69%).  • Impressions are consistent with a low risk study.    Zio patch 06/2017    1.  The predominant rhythm is a sinus rhythm with an average heart rate of 73 bpm.  2.  There are rare premature atrial ectopics.  There were no runs of atrial fibrillation nor were there any runs of  supraventricular tachycardia.  Bursts of an atrial tachycardia were noted but no longer than 8 beats.  3.  There were rare isolated premature ventricular ectopics recorded.  There were no couplets, triplets, ventricular tachycardia episodes recorded.      Past Medical History:  Past Medical History:   Diagnosis Date   • CAD (coronary artery disease)    • COPD (chronic obstructive pulmonary disease)    • Diabetes    • Disease of thyroid gland    • Elevated cholesterol    • Hyperlipidemia    • Hypertension    • Myocardial infarction 03/2011    tx with PCI   • Sinus bradycardia    • ST elevation myocardial infarction involving left anterior descending (LAD) coronary artery 5/6/2016 2011 tx with PCI       Past Surgical History:   Procedure Laterality Date   • CARDIAC CATHETERIZATION      2011:3.0x15mm Vision pLAD; 3.5x20mm Vision p circ; 3.5x28mm Vision to mRCA   • CARPAL TUNNEL RELEASE Left    • CORONARY ANGIOPLASTY     • CORONARY STENT PLACEMENT      2011:3.0x15mm Vision pLAD; 3.5x20mm Vision p circ; 3.5x28mm Vision to mRCA   • JOINT REPLACEMENT         Home Medications:   Medications Prior to Admission   Medication Sig Dispense Refill Last Dose   • albuterol (PROVENTIL HFA;VENTOLIN HFA) 108 (90 BASE) MCG/ACT inhaler Inhale 2 puffs Every 4 (Four) Hours As Needed for wheezing. 6.7 g 11    • aspirin 81 MG tablet Take 1 tablet by mouth Daily.      • budesonide (PULMICORT) 0.5 MG/2ML nebulizer solution Inhale 2 mL Daily.      • formoterol (PERFOROMIST) 20 MCG/2ML nebulizer solution Take  by nebulization 2 (Two) Times a Day.      • ipratropium-albuterol (DUO-NEB) 0.5-2.5 mg/3 ml nebulizer Take 3 mL by nebulization Every 4 (Four) Hours As Needed for Wheezing.      • levothyroxine (SYNTHROID, LEVOTHROID) 88 MCG tablet Take 75 mcg by mouth Daily.      • pravastatin (PRAVACHOL) 40 MG tablet Take 1 tablet by mouth Daily.      • Budeson-Glycopyrrol-Formoterol (BREZTRI AEROSPHERE IN) Inhale.      • meclizine (ANTIVERT) 25 MG  tablet Take 25 mg by mouth As Needed for Dizziness.      • montelukast (SINGULAIR) 10 MG tablet Take 1 tablet by mouth Every Night.      • tamsulosin (FLOMAX) 0.4 MG capsule 24 hr capsule Take 1 capsule by mouth Daily.      • traZODone (DESYREL) 50 MG tablet Take 1 tablet by mouth Every Night.          Allergies:  No Known Allergies    Past Social History:  Social History     Socioeconomic History   • Marital status:    Tobacco Use   • Smoking status: Former     Packs/day: 2.00     Years: 30.00     Pack years: 60.00     Types: Cigarettes     Quit date: 2011     Years since quittin.8   • Smokeless tobacco: Never   • Tobacco comments:     caffeine use - coffee on occas.    Vaping Use   • Vaping Use: Never used   Substance and Sexual Activity   • Alcohol use: Yes     Alcohol/week: 1.0 standard drink     Types: 1 Cans of beer per week     Comment: beer --occ   • Drug use: No   • Sexual activity: Defer       Past Family History:  Family History   Problem Relation Age of Onset   • No Known Problems Mother    • Cancer Father         lung cancer (smoker)    • No Known Problems Sister    • No Known Problems Brother    • No Known Problems Maternal Grandmother    • No Known Problems Maternal Grandfather    • No Known Problems Paternal Grandmother    • No Known Problems Paternal Grandfather    • No Known Problems Brother    • No Known Problems Sister    • Brain cancer Brother    • Heart attack Brother      Past surgical, medical, social and family history was reviewed, updated and amended when necessary.    Review of Systems   Constitutional: Positive for fever and malaise/fatigue. Negative for chills.   HENT: Negative for hoarse voice and sore throat.    Eyes: Negative for double vision and photophobia.   Cardiovascular: Positive for dyspnea on exertion. Negative for chest pain, leg swelling, near-syncope, orthopnea, palpitations, paroxysmal nocturnal dyspnea and syncope.   Respiratory: Positive for cough and  shortness of breath. Negative for wheezing.    Skin: Negative for poor wound healing and rash.   Musculoskeletal: Negative for arthritis and joint swelling.   Gastrointestinal: Negative for bloating, abdominal pain, hematemesis and hematochezia.   Neurological: Negative for dizziness and focal weakness.   Psychiatric/Behavioral: Negative for depression and suicidal ideas.         Objective:   Temp:  [97.6 °F (36.4 °C)-101.5 °F (38.6 °C)] 98.5 °F (36.9 °C)  Heart Rate:  [] 58  Resp:  [16-28] 18  BP: (110-133)/(66-98) 117/68     Intake/Output Summary (Last 24 hours) at 5/6/2023 0917  Last data filed at 5/6/2023 0300  Gross per 24 hour   Intake --   Output 700 ml   Net -700 ml     Body mass index is 24.32 kg/m².      05/05/23  1745 05/05/23  2213 05/06/23  0642   Weight: 71.7 kg (158 lb) 71.7 kg (158 lb) 69.4 kg (153 lb)     Weight change:     Vitals reviewed.   Constitutional:       Appearance: Not in distress. Chronically ill-appearing.   Neck:      Vascular: No JVR. JVD normal.   Pulmonary:      Effort: Pulmonary effort is normal.      Breath sounds: No wheezing. Rhonchi present. No rales.      Comments: Very poor air entry bibasilar zones  Chest:      Chest wall: Not tender to palpatation.   Cardiovascular:      PMI at left midclavicular line. Normal rate. Regular rhythm. Normal S1. Normal S2.      Murmurs: There is no murmur.      No gallop. No click. No rub.   Pulses:     Intact distal pulses.   Edema:     Peripheral edema absent.   Abdominal:      General: Bowel sounds are normal.      Palpations: Abdomen is soft.      Tenderness: There is no abdominal tenderness.   Musculoskeletal: Normal range of motion.         General: No tenderness. Skin:     General: Skin is warm and dry.   Neurological:      General: No focal deficit present.      Mental Status: Alert and oriented to person, place and time.           Lab Review:   Results from last 7 days   Lab Units 05/06/23  0625 05/05/23  1817   SODIUM mmol/L  133* 138   POTASSIUM mmol/L 4.4 4.2   CHLORIDE mmol/L 100 99   CO2 mmol/L 23.3 30.0*   BUN mg/dL 32* 34*   CREATININE mg/dL 1.02 1.18   GLUCOSE mg/dL 179* 111*   CALCIUM mg/dL 8.8 9.0   AST (SGOT) U/L  --  23   ALT (SGPT) U/L  --  21     Results from last 7 days   Lab Units 05/05/23 2052 05/05/23 1817   HSTROP T ng/L 23* 16*     Results from last 7 days   Lab Units 05/06/23  0625 05/05/23 1817   WBC 10*3/mm3 11.29* 12.07*   HEMOGLOBIN g/dL 14.8 17.1   HEMATOCRIT % 41.9 48.4   PLATELETS 10*3/mm3 162 203     Results from last 7 days   Lab Units 05/05/23 1817   INR  1.00   APTT seconds 24.1               Invalid input(s): LDLCALC            Echo  08/29/2022  • Estimated right ventricular systolic pressure from tricuspid regurgitation is normal (<35 mmHg). Calculated right ventricular systolic pressure from tricuspid regurgitation is 22.8 mmHg.  • Calculated left ventricular EF = 58.9% Estimated left ventricular EF was in agreement with the calculated left ventricular EF. Left ventricular systolic function is normal.  • There is calcification of the aortic valve.  • Left ventricular diastolic function was normal.         EKG:   I personally viewed and interpreted the patient's EKG      Prior EKG 8/11/2022      Imaging:  Imaging Results (Most Recent)     Procedure Component Value Units Date/Time    XR Chest 1 View [[id number]] Collected: 05/05/23 1904     Updated: 05/05/23 1907    Narrative:      XR CHEST 1 VW-     HISTORY: Male who is 72 years-old,  cough     TECHNIQUE: Frontal views of the chest     COMPARISON: 8/27/2020     FINDINGS: Heart, mediastinum and pulmonary vasculature are unremarkable.  No focal pulmonary consolidation, pleural effusion, or pneumothorax. ON  old granulomatous disease is seen. No acute osseous process.       Impression:      No focal pulmonary consolidation. Follow-up as clinical  indications persist.     This report was finalized on 5/5/2023 7:04 PM by Dr. Andrei Dumont M.D.                  Assessment:     Active Hospital Problems    Diagnosis  POA   • **Acute hypoxemic respiratory failure [J96.01]  Yes   • Elevated troponin [R77.8]  Unknown   • COPD exacerbation [J44.1]  Yes   • Acute on chronic respiratory failure with hypoxia [J96.21]  Yes   • Parainfluenza [B34.8]  Unknown   • Type 2 diabetes mellitus [E11.9]  Yes   • S/P coronary artery stent placement [Z95.5]  Not Applicable   • Hypertension [I10]  Yes      Resolved Hospital Problems   No resolved problems to display.          Plan:     1. Parainfluenza pneumonia-continue supportive care  2. Indeterminant troponin elevation-NSTEMI type II not consistent with ACS.  No angina.  Recent negative stress study.  No indication for further testing.  3. Coronary artery disease -continue current medical therapy  4. Advanced COPD-defer to medicine/pulmonary    We will follow tomorrow.  If cardiovascular issues remained stable we will see the patient as needed.  Thank you for the consult.    Thank you for allowing me to participate in the care of Db Hardin. Feel free to contact me directly with any further questions or concerns.    Abhijit Da Silva MD  [facility]  05/06/23  09:17 EDT          Electronically signed by Abhijit Da Silva Jr., MD at 05/06/23 0748

## 2024-05-24 NOTE — PROGRESS NOTES
Date of Office Visit: 2017  Encounter Provider: DAMARI Smith  Place of Service: [facility] CARDIOLOGY  Patient Name: Db Hardin  :1950    Chief Complaint   Patient presents with   • Shortness of Breath     1 week ED visit   :     HPI: Db Hardin is a 66 y.o. male who presents today for follow-up.  Old records have been obtained and reviewed by me.  He is a patient of Dr. Resendiz's with a past medical history significant for an anterior MI in . At that time, cardiac catheterization revealed a normal left main, 90% proximal LAD, two 90% proximal circumflex lesions, a 50% proximal RCA lesion, and a long area of aneurysmal disease in the RCA with an 80% lesion in the middle of it. He underwent successful bare metal stent placement to the LAD and circumflex. He was stabilized, and 3 days later brought back and had successful bare metal stent placed to the mid RCA stenosis. He did have an echocardiogram later that year which showed normal LV function and an EF of 55% without significant valvular abnormality.    I first saw him on 2017 when he was here for his yearly visit.  At that time, he complained of worsening shortness of breath on exertion.  It was difficult to discern the etiology of his dyspnea because he also has COPD.  He did have a nuclear stress test in 2015 that was normal and showed no evidence of ischemia.  He was facing upcoming knee surgery, and was going to require cardiac clearance.  Because of his worsening symptoms, I ordered a myocardial perfusion stress test.  Then on 2017, one day after his visit with me, he presented to the emergency room with chest pain.  He ruled out, and a stress test was ordered which was normal.  His creatinine did jump up, and it was felt he could have been dehydrated.  His ACE inhibitor was discontinued.  He was discharged home.  Then on 2017 he presented to the emergency room with a  syncopal episode.  His carotids that showed a mild bilateral stenosis, and an echocardiogram was unremarkable.  He was diagnosed with a periapical abscess on his right mandible, and was seen by infectious disease and started on antibiotics.  Oral maxillofacial surgery was consult it, and recommendations were to follow up with them in clinic in 1 week as an outpatient.  His platelets did drop significantly, and a hit antibody was sent that pending at the time of discharge.  Today it is back and within normal limits.  A Zio patch was placed, and Dr. Resendiz mentioned that we might consider a tilt table test in the future.   Since he's been home, he's been about the same.  He has complaints of shortness of breath on exertion, palpitations, lightheadedness, fatigue, and chest pain.  Fortunately, he has not had any more syncopal episodes.  There is some confusion about his thyroid medication.  He was taking 100 µg of Synthroid daily prior to his hospitalization and his TSH was low with an elevated T4.  It was recommended that he reduce his dose of Synthroid, however he has not done this yet.  He does not have a pulmonologist.  Fortunately, he no longer smokes.  He is scheduled to see the oral surgeon tomorrow.      Past Medical History:   Diagnosis Date   • CAD (coronary artery disease)    • COPD (chronic obstructive pulmonary disease)    • Diabetes    • Hyperlipidemia    • Myocardial infarction 03/2011    tx with PCI   • Sinus bradycardia    • ST elevation myocardial infarction involving left anterior descending (LAD) coronary artery 5/6/2016 2011 tx with PCI       Past Surgical History:   Procedure Laterality Date   • CARDIAC CATHETERIZATION      2011:3.0x15mm Vision pLAD; 3.5x20mm Vision p circ; 3.5x28mm Vision to mRCA   • CORONARY ANGIOPLASTY     • CORONARY STENT PLACEMENT      2011:3.0x15mm Vision pLAD; 3.5x20mm Vision p circ; 3.5x28mm Vision to mRCA       Social History     Social History   • Marital status:       Spouse name: N/A   • Number of children: N/A   • Years of education: N/A     Occupational History   • Not on file.     Social History Main Topics   • Smoking status: Former Smoker   • Smokeless tobacco: Not on file   • Alcohol use No   • Drug use: No   • Sexual activity: Defer     Other Topics Concern   • Not on file     Social History Narrative       Family History   Problem Relation Age of Onset   • No Known Problems Mother    • Cancer Father    • No Known Problems Sister    • No Known Problems Brother    • No Known Problems Maternal Grandmother    • No Known Problems Maternal Grandfather    • No Known Problems Paternal Grandmother    • No Known Problems Paternal Grandfather    • No Known Problems Brother    • No Known Problems Sister    • Brain cancer Brother        Review of Systems   Constitution: Positive for malaise/fatigue. Negative for chills, fever, weight gain and weight loss.   HENT: Negative for ear pain, headaches, hearing loss, nosebleeds and sore throat.    Eyes: Negative for double vision, pain and visual disturbance.   Cardiovascular: Positive for chest pain, dyspnea on exertion and leg swelling. Negative for irregular heartbeat, near-syncope, orthopnea, palpitations, paroxysmal nocturnal dyspnea and syncope.   Respiratory: Positive for cough. Negative for shortness of breath, sleep disturbances due to breathing, snoring and wheezing.    Endocrine: Negative for cold intolerance, heat intolerance and polyuria.   Skin: Negative for itching and rash.   Musculoskeletal: Negative for joint pain, joint swelling and myalgias.   Gastrointestinal: Negative for abdominal pain, diarrhea, melena, nausea and vomiting.   Genitourinary: Negative for frequency, hematuria and hesitancy.   Neurological: Negative for excessive daytime sleepiness, light-headedness, numbness, paresthesias and seizures.   Psychiatric/Behavioral: Negative for altered mental status and depression.   Allergic/Immunologic:  "Negative.    All other systems reviewed and are negative.      No Known Allergies      Current Outpatient Prescriptions:   •  albuterol (PROVENTIL HFA;VENTOLIN HFA) 108 (90 BASE) MCG/ACT inhaler, Inhale 2 puffs Every 4 (Four) Hours As Needed for wheezing., Disp: 6.7 g, Rfl: 11  •  aspirin 81 MG tablet, Take 81 mg by mouth Daily., Disp: , Rfl:   •  levothyroxine (SYNTHROID, LEVOTHROID) 50 MCG tablet, Take 2 tablets by mouth Daily., Disp: 30 tablet, Rfl: 1  •  pravastatin (PRAVACHOL) 40 MG tablet, Take 40 mg by mouth Daily., Disp: , Rfl:      Objective:     Vitals:    06/12/17 1010 06/12/17 1018   BP: 128/78 130/80   BP Location: Right arm Left arm   Pulse: 62    SpO2: 99% (!) 86%   Weight: 146 lb (66.2 kg)    Height: 66\" (167.6 cm)      Body mass index is 23.57 kg/(m^2).    PHYSICAL EXAM:    Physical Exam   Constitutional: He is oriented to person, place, and time. He appears well-developed and well-nourished. No distress.   HENT:   Head: Normocephalic and atraumatic.   Eyes: Pupils are equal, round, and reactive to light.   Neck: No JVD present. No thyromegaly present.   Cardiovascular: Normal rate, regular rhythm, normal heart sounds and intact distal pulses.    No murmur heard.  Mild bilateral lower extremity edema in the ankles.   Pulmonary/Chest: Effort normal. No respiratory distress. He has decreased breath sounds.   Abdominal: Soft. Bowel sounds are normal. He exhibits no distension. There is no splenomegaly or hepatomegaly. There is no tenderness.   Musculoskeletal: Normal range of motion. He exhibits no edema.   Neurological: He is alert and oriented to person, place, and time.   Skin: Skin is warm and dry. He is not diaphoretic. No erythema.   Psychiatric: He has a normal mood and affect. His behavior is normal. Judgment normal.         ECG 12 Lead  Date/Time: 6/12/2017 10:34 AM  Performed by: TOLU MARIE.  Authorized by: TOLU MARIE   Comparison: compared with previous ECG from 6/3/2017  Similar " to previous ECG  Rhythm: sinus rhythm  BPM: 62  Clinical impression: abnormal ECG and low voltage  Comments: Indication: Coronary artery disease, status post stent placement, syncopal episode.              Assessment:       Diagnosis Plan   1. Syncope and collapse  ECG 12 Lead   2. Coronary artery disease involving native coronary artery of native heart without angina pectoris  ECG 12 Lead   3. Chronic obstructive pulmonary disease, unspecified COPD type  ECG 12 Lead   4. Thrombocytopenia  CBC & Differential   5. Elevated serum creatinine  Comprehensive Metabolic Panel   6. Hypothyroidism, unspecified type  Thyroid Panel With TSH     Orders Placed This Encounter   Procedures   • Thyroid Panel With TSH     Standing Status:   Future     Standing Expiration Date:   6/12/2018   • Comprehensive Metabolic Panel     Standing Status:   Future     Standing Expiration Date:   6/12/2018   • ECG 12 Lead     This order was created via procedure documentation   • CBC & Differential     Standing Status:   Future     Standing Expiration Date:   6/12/2018     Order Specific Question:   Manual Differential     Answer:   No          Plan:       1.  Coronary Artery Disease  Assessment  • The patient has CCS class II - angina only during vigorous physical activity    Subjective - Objective  • There is a history of past MI  • There has been a previous stent procedure using BMS  • Current antiplatelet therapy includes aspirin 81 mg  • He has chest pain and shortness of breath on exertion.  He had a stress test that showed no evidence of ischemia.  I do still worry about this patient.  Despite his negative stress test, I still feel like there may be some coronary disease at play here.  I've asked the patient and his family to keep an eye on his symptoms, and if he has any more chest pain to come to the emergency room or call our office.  Certainly he could've had a negative stress test.  He is stable today.    2.  COPD and shortness of  breath.  He does get hypoxic with ambulation, and has sats dropped to 86%.  He does not have a pulmonologist, I am going to refer him today.  He is on no medications for COPD, and I do think that he can greatly benefit from this.    3.  Thrombocytopenia.  Check a CBC today.    4.  Elevated creatinine.  Check a CMP today.    5.  Hypothyroidism, recently elevated T4.  He was supposed to decrease his dose of Synthroid, however this was not done.  I'm going to recheck some thyroid labs on him today.  Ultimately he will need to follow up with his PCP about this.  Next    He will follow-up with Dr. Resendiz on 7/7/2017 or sooner if needed.    As always, it has been a pleasure to participate in your patient's care.      Sincerely,         Dara Olson PA-C     none

## 2024-08-13 ENCOUNTER — OFFICE VISIT (OUTPATIENT)
Dept: CARDIOLOGY | Facility: CLINIC | Age: 74
End: 2024-08-13
Payer: MEDICARE

## 2024-08-13 VITALS
HEART RATE: 80 BPM | DIASTOLIC BLOOD PRESSURE: 78 MMHG | BODY MASS INDEX: 23.95 KG/M2 | WEIGHT: 149 LBS | HEIGHT: 66 IN | SYSTOLIC BLOOD PRESSURE: 118 MMHG

## 2024-08-13 DIAGNOSIS — E78.5 HYPERLIPIDEMIA, UNSPECIFIED HYPERLIPIDEMIA TYPE: ICD-10-CM

## 2024-08-13 DIAGNOSIS — I10 PRIMARY HYPERTENSION: ICD-10-CM

## 2024-08-13 DIAGNOSIS — I25.10 CORONARY ARTERY DISEASE INVOLVING NATIVE CORONARY ARTERY OF NATIVE HEART WITHOUT ANGINA PECTORIS: Primary | ICD-10-CM

## 2024-08-13 PROCEDURE — 3078F DIAST BP <80 MM HG: CPT | Performed by: NURSE PRACTITIONER

## 2024-08-13 PROCEDURE — 1160F RVW MEDS BY RX/DR IN RCRD: CPT | Performed by: NURSE PRACTITIONER

## 2024-08-13 PROCEDURE — 99214 OFFICE O/P EST MOD 30 MIN: CPT | Performed by: NURSE PRACTITIONER

## 2024-08-13 PROCEDURE — 93000 ELECTROCARDIOGRAM COMPLETE: CPT | Performed by: NURSE PRACTITIONER

## 2024-08-13 PROCEDURE — 3074F SYST BP LT 130 MM HG: CPT | Performed by: NURSE PRACTITIONER

## 2024-08-13 PROCEDURE — 1159F MED LIST DOCD IN RCRD: CPT | Performed by: NURSE PRACTITIONER

## 2024-08-13 NOTE — PROGRESS NOTES
Date of Office Visit: 2024  Encounter Provider: MICHAEL Hines  Place of Service: [facility] CARDIOLOGY  Patient Name: Db Hardin  :1950    Chief Complaint   Patient presents with    Coronary Artery Disease   :     HPI: Db Hardin is a 73 y.o. male patient of [facility] with hypertension, hyperlipidemia, and coronary artery disease.  In , he suffered a STEMI and underwent bare-metal stent placement in all 3 of his coronaries.  He has severe emphysema and chronic shortness of breath.    In May 2023, he was found initially hospitalized at [facility] with dyspnea and an elevated troponin in the presence of parainfluenza pneumonia.  He ended up transferring to [facility] and was diagnosed with a bilateral PE and started on Eliquis.  Subsequently he ended up at [facility] where he underwent a CT scan which was negative for PE.    He was last seen in the office by Dr. Resendiz in 2023 at which time he was overall doing well.  It sounds like there was concern he may have lung cancer.  Dr. Resendiz felt his lung disease was really dominating his life.  There was some confusion regarding whether or not he actually had a PE.  However, Dr. Resendiz ultimately referred that to pulmonary.  No changes were made to his regimen, and he was advised follow-up in 1 year.    From a cardiac standpoint, he has been doing well.  He denies any chest pain, palpitations, edema, dizziness, or syncope.  He has chronic dyspnea on exertion that is stable.  He has been dealing with a sinus infection which is really his biggest complaint.    Past Medical History:   Diagnosis Date    CAD (coronary artery disease)     COPD (chronic obstructive pulmonary disease)     Diabetes     Disease of thyroid gland     Elevated cholesterol     Hyperlipidemia     Hypertension     Myocardial infarction 2011    tx with PCI    Sinus bradycardia     ST elevation myocardial infarction involving left anterior  descending (LAD) coronary artery 2016    2011 tx with PCI       Past Surgical History:   Procedure Laterality Date    CARDIAC CATHETERIZATION      2011:3.0x15mm Vision pLAD; 3.5x20mm Vision p circ; 3.5x28mm Vision to mRCA    CARPAL TUNNEL RELEASE Left     CORONARY ANGIOPLASTY      CORONARY STENT PLACEMENT      2011:3.0x15mm Vision pLAD; 3.5x20mm Vision p circ; 3.5x28mm Vision to mRCA    JOINT REPLACEMENT         Social History     Socioeconomic History    Marital status:    Tobacco Use    Smoking status: Former     Current packs/day: 0.00     Average packs/day: 2.0 packs/day for 30.0 years (60.0 ttl pk-yrs)     Types: Cigarettes     Start date: 1981     Quit date: 2011     Years since quittin.1     Passive exposure: Past    Smokeless tobacco: Never    Tobacco comments:     caffeine use - coffee on occas.    Vaping Use    Vaping status: Never Used   Substance and Sexual Activity    Alcohol use: Yes     Alcohol/week: 1.0 standard drink of alcohol     Types: 1 Cans of beer per week     Comment: beer --occ    Drug use: No    Sexual activity: Defer       Family History   Problem Relation Age of Onset    No Known Problems Mother     Cancer Father         lung cancer (smoker)     No Known Problems Sister     No Known Problems Brother     No Known Problems Maternal Grandmother     No Known Problems Maternal Grandfather     No Known Problems Paternal Grandmother     No Known Problems Paternal Grandfather     No Known Problems Brother     No Known Problems Sister     Brain cancer Brother     Heart attack Brother        Review of Systems   Constitutional: Negative.   Cardiovascular:  Positive for dyspnea on exertion. Negative for chest pain, leg swelling, orthopnea, paroxysmal nocturnal dyspnea and syncope.   Respiratory: Negative.     Hematologic/Lymphatic: Negative for bleeding problem.   Musculoskeletal:  Negative for falls.   Gastrointestinal:  Negative for melena.   Neurological:  Negative for  "dizziness and light-headedness.       No Known Allergies      Current Outpatient Medications:     albuterol (PROVENTIL HFA;VENTOLIN HFA) 108 (90 BASE) MCG/ACT inhaler, Inhale 2 puffs Every 4 (Four) Hours As Needed for wheezing., Disp: 6.7 g, Rfl: 11    aspirin 81 MG tablet, Take 1 tablet by mouth Daily., Disp: , Rfl:     Breztri Aerosphere 160-9-4.8 MCG/ACT aerosol inhaler, Inhale 2 puffs 2 (Two) Times a Day., Disp: , Rfl:     furosemide (LASIX) 20 MG tablet, Take 1 tablet by mouth Daily., Disp: 30 tablet, Rfl: 11    levothyroxine (SYNTHROID, LEVOTHROID) 75 MCG tablet, Take 1 tablet by mouth Daily., Disp: , Rfl:     meclizine (ANTIVERT) 25 MG tablet, Take 1 tablet by mouth As Needed for Dizziness., Disp: , Rfl:     pravastatin (PRAVACHOL) 40 MG tablet, Take 1 tablet by mouth Daily., Disp: , Rfl:     tamsulosin (FLOMAX) 0.4 MG capsule 24 hr capsule, Take 1 capsule by mouth Daily., Disp: , Rfl:     traZODone (DESYREL) 50 MG tablet, Take 1 tablet by mouth Every Night., Disp: , Rfl:       Objective:     Vitals:    08/13/24 1327   BP: 118/78   Pulse: 80   Weight: 67.6 kg (149 lb)   Height: 167.6 cm (66\")     Body mass index is 24.05 kg/m².    PHYSICAL EXAM:    Neck:      Vascular: No JVD.   Pulmonary:      Effort: Pulmonary effort is normal.      Breath sounds: Normal breath sounds.   Cardiovascular:      Normal rate. Regular rhythm.      Murmurs: There is no murmur.      No gallop.  No click. No rub.   Pulses:     Intact distal pulses.           ECG 12 Lead    Date/Time: 8/13/2024 1:34 PM  Performed by: Linda Lock APRN    Authorized by: Linda Lock APRN  Comparison: compared with previous ECG from 8/11/2023  Similar to previous ECG  Rhythm: sinus rhythm  Rate: normal  BPM: 80            Assessment:       Diagnosis Plan   1. Coronary artery disease involving native coronary artery of native heart without angina pectoris  ECG 12 Lead      2. Primary hypertension        3. Hyperlipidemia, unspecified " hyperlipidemia type          Orders Placed This Encounter   Procedures    ECG 12 Lead     This order was created via procedure documentation     Order Specific Question:   Release to patient     Answer:   Routine Release [[id number]]          Plan:       1.  Coronary artery disease.  He denies any symptoms of angina.  Continue aspirin and pravastatin.      2.  Hypertension.  His blood pressure is stable.      3.  Hyperlipidemia.  Continue pravastatin.      I think he is doing well.  I am not making any changes today, and he will follow-up with Dr. Resendiz in 1 year.      As always, it has been a pleasure to participate in your patient's care.      Sincerely,         MICHAEL Chong

## 2025-08-13 ENCOUNTER — OFFICE VISIT (OUTPATIENT)
Age: 75
End: 2025-08-13
Payer: MEDICARE

## 2025-08-13 VITALS
HEART RATE: 70 BPM | SYSTOLIC BLOOD PRESSURE: 120 MMHG | WEIGHT: 169 LBS | DIASTOLIC BLOOD PRESSURE: 66 MMHG | HEIGHT: 66 IN | BODY MASS INDEX: 27.16 KG/M2

## 2025-08-13 DIAGNOSIS — I10 PRIMARY HYPERTENSION: ICD-10-CM

## 2025-08-13 DIAGNOSIS — I25.10 CORONARY ARTERY DISEASE INVOLVING NATIVE CORONARY ARTERY OF NATIVE HEART WITHOUT ANGINA PECTORIS: ICD-10-CM

## 2025-08-13 DIAGNOSIS — E11.59 TYPE 2 DIABETES MELLITUS WITH OTHER CIRCULATORY COMPLICATION, WITHOUT LONG-TERM CURRENT USE OF INSULIN: ICD-10-CM

## 2025-08-13 DIAGNOSIS — E78.5 HYPERLIPIDEMIA, UNSPECIFIED HYPERLIPIDEMIA TYPE: ICD-10-CM

## 2025-08-13 DIAGNOSIS — Z95.5 S/P CORONARY ARTERY STENT PLACEMENT: Primary | ICD-10-CM

## 2025-08-13 PROBLEM — R79.89 ELEVATED TROPONIN: Status: RESOLVED | Noted: 2023-05-06 | Resolved: 2025-08-13
